# Patient Record
Sex: FEMALE | Race: BLACK OR AFRICAN AMERICAN | NOT HISPANIC OR LATINO
[De-identification: names, ages, dates, MRNs, and addresses within clinical notes are randomized per-mention and may not be internally consistent; named-entity substitution may affect disease eponyms.]

---

## 2019-07-09 ENCOUNTER — APPOINTMENT (OUTPATIENT)
Dept: VASCULAR SURGERY | Facility: CLINIC | Age: 54
End: 2019-07-09
Payer: OTHER MISCELLANEOUS

## 2019-07-09 VITALS — DIASTOLIC BLOOD PRESSURE: 117 MMHG | SYSTOLIC BLOOD PRESSURE: 172 MMHG | HEART RATE: 92 BPM

## 2019-07-09 VITALS
TEMPERATURE: 98.3 F | DIASTOLIC BLOOD PRESSURE: 106 MMHG | BODY MASS INDEX: 53.92 KG/M2 | WEIGHT: 293 LBS | HEIGHT: 62 IN | HEART RATE: 93 BPM | SYSTOLIC BLOOD PRESSURE: 160 MMHG

## 2019-07-09 PROCEDURE — 93970 EXTREMITY STUDY: CPT

## 2019-07-09 PROCEDURE — 99202 OFFICE O/P NEW SF 15 MIN: CPT

## 2019-07-09 NOTE — HISTORY OF PRESENT ILLNESS
[FreeTextEntry1] : 54F on coumadin for history of multiple unprovoked DVT and PE, presents with LLE swelling. She first noticed the swelling 2 months ago when she got hit in the shin. She is able to ambulate without difficulty but feel her legs getting tired throughout the day. She has been therapeutic on her coumadin. She wears her compression stockings intermittently.

## 2019-07-09 NOTE — ASSESSMENT
[Arterial/Venous Disease] : arterial/venous disease [FreeTextEntry1] : 54F with LLE swelling, duplex demonstrated no DVT or reflux  No evidence  of  Venous  Insufficiency. Lymphedema  is  possible  \par - Encouraged weight loss \par - Continue compression stocking

## 2019-07-09 NOTE — PHYSICAL EXAM
[Normal Breath Sounds] : Normal breath sounds [Normal Heart Sounds] : normal heart sounds [Ankle Swelling (On Exam)] : present [2+] : left 2+ [Ankle Swelling Bilaterally] : severe [de-identified] : well appearing

## 2021-07-12 ENCOUNTER — INPATIENT (INPATIENT)
Facility: HOSPITAL | Age: 56
LOS: 8 days | Discharge: HOME HEALTH | End: 2021-07-21
Attending: HOSPITALIST | Admitting: HOSPITALIST
Payer: COMMERCIAL

## 2021-07-12 VITALS
DIASTOLIC BLOOD PRESSURE: 96 MMHG | OXYGEN SATURATION: 100 % | SYSTOLIC BLOOD PRESSURE: 176 MMHG | HEART RATE: 97 BPM | RESPIRATION RATE: 20 BRPM | TEMPERATURE: 98 F

## 2021-07-12 LAB
ALBUMIN SERPL ELPH-MCNC: 4.5 G/DL — SIGNIFICANT CHANGE UP (ref 3.3–5)
ALP SERPL-CCNC: 137 U/L — HIGH (ref 40–120)
ALT FLD-CCNC: 32 U/L — SIGNIFICANT CHANGE UP (ref 4–33)
ANION GAP SERPL CALC-SCNC: 12 MMOL/L — SIGNIFICANT CHANGE UP (ref 7–14)
APTT BLD: 40.6 SEC — HIGH (ref 27–36.3)
AST SERPL-CCNC: 21 U/L — SIGNIFICANT CHANGE UP (ref 4–32)
BASOPHILS # BLD AUTO: 0.04 K/UL — SIGNIFICANT CHANGE UP (ref 0–0.2)
BASOPHILS NFR BLD AUTO: 0.5 % — SIGNIFICANT CHANGE UP (ref 0–2)
BILIRUB SERPL-MCNC: 0.5 MG/DL — SIGNIFICANT CHANGE UP (ref 0.2–1.2)
BUN SERPL-MCNC: 8 MG/DL — SIGNIFICANT CHANGE UP (ref 7–23)
CALCIUM SERPL-MCNC: 9.8 MG/DL — SIGNIFICANT CHANGE UP (ref 8.4–10.5)
CHLORIDE SERPL-SCNC: 104 MMOL/L — SIGNIFICANT CHANGE UP (ref 98–107)
CO2 SERPL-SCNC: 29 MMOL/L — SIGNIFICANT CHANGE UP (ref 22–31)
CREAT SERPL-MCNC: 0.84 MG/DL — SIGNIFICANT CHANGE UP (ref 0.5–1.3)
EOSINOPHIL # BLD AUTO: 0.12 K/UL — SIGNIFICANT CHANGE UP (ref 0–0.5)
EOSINOPHIL NFR BLD AUTO: 1.5 % — SIGNIFICANT CHANGE UP (ref 0–6)
GLUCOSE SERPL-MCNC: 98 MG/DL — SIGNIFICANT CHANGE UP (ref 70–99)
HCT VFR BLD CALC: 49.1 % — HIGH (ref 34.5–45)
HGB BLD-MCNC: 14.4 G/DL — SIGNIFICANT CHANGE UP (ref 11.5–15.5)
IANC: 5.4 K/UL — SIGNIFICANT CHANGE UP (ref 1.5–8.5)
IMM GRANULOCYTES NFR BLD AUTO: 0.4 % — SIGNIFICANT CHANGE UP (ref 0–1.5)
INR BLD: 2.18 RATIO — HIGH (ref 0.88–1.16)
LYMPHOCYTES # BLD AUTO: 2.03 K/UL — SIGNIFICANT CHANGE UP (ref 1–3.3)
LYMPHOCYTES # BLD AUTO: 24.7 % — SIGNIFICANT CHANGE UP (ref 13–44)
MCHC RBC-ENTMCNC: 27.6 PG — SIGNIFICANT CHANGE UP (ref 27–34)
MCHC RBC-ENTMCNC: 29.3 GM/DL — LOW (ref 32–36)
MCV RBC AUTO: 94.1 FL — SIGNIFICANT CHANGE UP (ref 80–100)
MONOCYTES # BLD AUTO: 0.61 K/UL — SIGNIFICANT CHANGE UP (ref 0–0.9)
MONOCYTES NFR BLD AUTO: 7.4 % — SIGNIFICANT CHANGE UP (ref 2–14)
NEUTROPHILS # BLD AUTO: 5.4 K/UL — SIGNIFICANT CHANGE UP (ref 1.8–7.4)
NEUTROPHILS NFR BLD AUTO: 65.5 % — SIGNIFICANT CHANGE UP (ref 43–77)
NRBC # BLD: 0 /100 WBCS — SIGNIFICANT CHANGE UP
NRBC # FLD: 0 K/UL — SIGNIFICANT CHANGE UP
NT-PROBNP SERPL-SCNC: 189 PG/ML — SIGNIFICANT CHANGE UP
PLATELET # BLD AUTO: 260 K/UL — SIGNIFICANT CHANGE UP (ref 150–400)
POTASSIUM SERPL-MCNC: 3.7 MMOL/L — SIGNIFICANT CHANGE UP (ref 3.5–5.3)
POTASSIUM SERPL-SCNC: 3.7 MMOL/L — SIGNIFICANT CHANGE UP (ref 3.5–5.3)
PROT SERPL-MCNC: 7.5 G/DL — SIGNIFICANT CHANGE UP (ref 6–8.3)
PROTHROM AB SERPL-ACNC: 24.2 SEC — HIGH (ref 10.6–13.6)
RBC # BLD: 5.22 M/UL — HIGH (ref 3.8–5.2)
RBC # FLD: 15.1 % — HIGH (ref 10.3–14.5)
SODIUM SERPL-SCNC: 145 MMOL/L — SIGNIFICANT CHANGE UP (ref 135–145)
TROPONIN T, HIGH SENSITIVITY RESULT: 8 NG/L — SIGNIFICANT CHANGE UP
WBC # BLD: 8.23 K/UL — SIGNIFICANT CHANGE UP (ref 3.8–10.5)
WBC # FLD AUTO: 8.23 K/UL — SIGNIFICANT CHANGE UP (ref 3.8–10.5)

## 2021-07-12 PROCEDURE — 99285 EMERGENCY DEPT VISIT HI MDM: CPT

## 2021-07-12 PROCEDURE — 71045 X-RAY EXAM CHEST 1 VIEW: CPT | Mod: 26

## 2021-07-12 NOTE — ED ADULT TRIAGE NOTE - CHIEF COMPLAINT QUOTE
Pt with PMH of DVT and PE on Warfarin, sent by PMD for eval of SOB and VIERA x 2 weeks. Denies recent travel or covid history, fully vaccinated. Denies chest pain.

## 2021-07-12 NOTE — ED PROVIDER NOTE - OBJECTIVE STATEMENT
55 yo f pmh htn, PE, LLE DVT, on coumadin, p/w VIERA x 2 weeks. sxs worsening with walking, today pt felt SOB while driving and called PMD who told pt to come to ED. has a hx of sob and water retention, currently not on any diuretics.   pt denies any fever, chills, dizziness, cp, palpitations, wheezing, abdominal pain, n/v/d, dysuria, hematuria, or any weight loss.    has been complaint with her medication.

## 2021-07-12 NOTE — ED PROVIDER NOTE - PROGRESS NOTE DETAILS
Jigar Roberson DO: pt w/ o2 requirement, on nc w/ improvement. Joel Barth PGY-2 patient desat to 85% on ambulation, became tachypneic. will admit for further monitoring. patient states she feels too sob to go home.

## 2021-07-12 NOTE — ED PROVIDER NOTE - PHYSICAL EXAMINATION
Vital signs reviewed  GENERAL: Patient nontoxic appearing, NAD. obese female.   HEAD: NCAT  EYES: Anicteric  ENT: MMM  NECK: Supple, non tender  RESPIRATORY: Normal respiratory effort. CTA B/L. No wheezing, rales, rhonchi  CARDIOVASCULAR: Regular rate and rhythm  ABDOMEN: Soft. Nondistended. Nontender. No guarding or rebound. No CVA tenderness.  MUSCULOSKELETAL/EXTREMITIES: Brisk cap refill. 2+ radial pulses. b/l 2+ pitting edema.   SKIN:  Warm and dry  NEURO: AAOx3. No gross FND.  PSYCHIATRIC: Cooperative. Affect appropriate.

## 2021-07-12 NOTE — ED PROCEDURE NOTE - ATTENDING CONTRIBUTION TO CARE
Jigar Roberson DO:  patient seen and evaluated with the resident.  I was present for key portions of the History & Physical, and I agree with the Impression & Plan.

## 2021-07-12 NOTE — ED PROVIDER NOTE - CLINICAL SUMMARY MEDICAL DECISION MAKING FREE TEXT BOX
55 yo f pmh htn, PE, LLE DVT, on coumadin, p/w VIERA x 2 weeks. sxs worsening with walking, today pt felt SOB while driving and called PMD who told pt to come to ED. has a hx of sob and water retention, currently not on any diuretics.  vtials wnl, on exam patient has 2+ pitting edema, lungs clear, will eval for pneumonia, PE, covid, chf with labs and imaging.

## 2021-07-12 NOTE — ED PROVIDER NOTE - NS ED ROS FT
Constitutional: No fever, chills.  Eyes:  No visual changes  ENMT:  No neck pain  Cardiac:  No chest pain  Respiratory:  +cough, +SOB  GI:  No nausea, vomiting, diarrhea, abdominal pain.  :  No dysuria, hematuria  MS:  No back pain.  Neuro:  No headache or lightheadedness  Skin:  No skin rash  Endocrine: No history of thyroid disease or diabetes.  Except as documented in the HPI,  all other systems are negative.

## 2021-07-12 NOTE — ED ADULT NURSE REASSESSMENT NOTE - NS ED NURSE REASSESS COMMENT FT1
Pt is AOX4, Breathing non-labored but complain of SOB at this time. Pt placed on cardiac monitor, NSR noted, SpO2 86% on room air, MD Roberson aware. Pt placed on 3L of O2 NC, SpO2 improved to 100%. Awaiting CT

## 2021-07-12 NOTE — ED PROVIDER NOTE - ATTENDING CONTRIBUTION TO CARE
Jigar Roberson, DO:  patient seen and evaluated with the resident.  I was present for key portions of the History & Physical, and I agree with the Impression & Plan. 57 yo f pmh htn, PE, LLE DVT, on coumadin, pw lewis. reports two weeks of sx. worst today prompting eval. denies cp. denies n/v, ha, f/c, cough, s/p covid vax. ekg nsr. arrives hds, no hypoxia upon initial eval. appears mildly dyspneic, lungs cta, rrr. will eval for acs, pe, if w/u non actionable likely admit for tte.

## 2021-07-12 NOTE — ED ADULT NURSE NOTE - OBJECTIVE STATEMENT
FLOAT: Received patient to 20 for SOB. A&o4, ambulatory, hx of DVT and PE on Warfarin, sent by PCP to r/o PE, pt c/o dyspnea on exertion, rr even and unlabored, 100% on room air at this time. Denies chest pain, n/v. hx of HTN. RN at bedside for IV insertion.

## 2021-07-13 DIAGNOSIS — I27.82 CHRONIC PULMONARY EMBOLISM: ICD-10-CM

## 2021-07-13 DIAGNOSIS — R06.00 DYSPNEA, UNSPECIFIED: ICD-10-CM

## 2021-07-13 DIAGNOSIS — R07.89 OTHER CHEST PAIN: ICD-10-CM

## 2021-07-13 DIAGNOSIS — E66.01 MORBID (SEVERE) OBESITY DUE TO EXCESS CALORIES: ICD-10-CM

## 2021-07-13 DIAGNOSIS — Z86.39 PERSONAL HISTORY OF OTHER ENDOCRINE, NUTRITIONAL AND METABOLIC DISEASE: Chronic | ICD-10-CM

## 2021-07-13 DIAGNOSIS — Z29.9 ENCOUNTER FOR PROPHYLACTIC MEASURES, UNSPECIFIED: ICD-10-CM

## 2021-07-13 DIAGNOSIS — I10 ESSENTIAL (PRIMARY) HYPERTENSION: ICD-10-CM

## 2021-07-13 DIAGNOSIS — Z98.891 HISTORY OF UTERINE SCAR FROM PREVIOUS SURGERY: Chronic | ICD-10-CM

## 2021-07-13 LAB
ANION GAP SERPL CALC-SCNC: 13 MMOL/L — SIGNIFICANT CHANGE UP (ref 7–14)
APTT BLD: 38.4 SEC — HIGH (ref 27–36.3)
BASOPHILS # BLD AUTO: 0.03 K/UL — SIGNIFICANT CHANGE UP (ref 0–0.2)
BASOPHILS NFR BLD AUTO: 0.4 % — SIGNIFICANT CHANGE UP (ref 0–2)
BUN SERPL-MCNC: 7 MG/DL — SIGNIFICANT CHANGE UP (ref 7–23)
CALCIUM SERPL-MCNC: 9.2 MG/DL — SIGNIFICANT CHANGE UP (ref 8.4–10.5)
CHLORIDE SERPL-SCNC: 103 MMOL/L — SIGNIFICANT CHANGE UP (ref 98–107)
CO2 SERPL-SCNC: 28 MMOL/L — SIGNIFICANT CHANGE UP (ref 22–31)
CREAT SERPL-MCNC: 0.77 MG/DL — SIGNIFICANT CHANGE UP (ref 0.5–1.3)
EOSINOPHIL # BLD AUTO: 0.16 K/UL — SIGNIFICANT CHANGE UP (ref 0–0.5)
EOSINOPHIL NFR BLD AUTO: 2.4 % — SIGNIFICANT CHANGE UP (ref 0–6)
GLUCOSE SERPL-MCNC: 102 MG/DL — HIGH (ref 70–99)
HCT VFR BLD CALC: 45.6 % — HIGH (ref 34.5–45)
HGB BLD-MCNC: 13.5 G/DL — SIGNIFICANT CHANGE UP (ref 11.5–15.5)
IANC: 4.57 K/UL — SIGNIFICANT CHANGE UP (ref 1.5–8.5)
IMM GRANULOCYTES NFR BLD AUTO: 0.3 % — SIGNIFICANT CHANGE UP (ref 0–1.5)
INR BLD: 2.14 RATIO — HIGH (ref 0.88–1.16)
LYMPHOCYTES # BLD AUTO: 1.4 K/UL — SIGNIFICANT CHANGE UP (ref 1–3.3)
LYMPHOCYTES # BLD AUTO: 20.6 % — SIGNIFICANT CHANGE UP (ref 13–44)
MAGNESIUM SERPL-MCNC: 2.3 MG/DL — SIGNIFICANT CHANGE UP (ref 1.6–2.6)
MCHC RBC-ENTMCNC: 27.6 PG — SIGNIFICANT CHANGE UP (ref 27–34)
MCHC RBC-ENTMCNC: 29.6 GM/DL — LOW (ref 32–36)
MCV RBC AUTO: 93.1 FL — SIGNIFICANT CHANGE UP (ref 80–100)
MONOCYTES # BLD AUTO: 0.62 K/UL — SIGNIFICANT CHANGE UP (ref 0–0.9)
MONOCYTES NFR BLD AUTO: 9.1 % — SIGNIFICANT CHANGE UP (ref 2–14)
NEUTROPHILS # BLD AUTO: 4.57 K/UL — SIGNIFICANT CHANGE UP (ref 1.8–7.4)
NEUTROPHILS NFR BLD AUTO: 67.2 % — SIGNIFICANT CHANGE UP (ref 43–77)
NRBC # BLD: 0 /100 WBCS — SIGNIFICANT CHANGE UP
NRBC # FLD: 0 K/UL — SIGNIFICANT CHANGE UP
PLATELET # BLD AUTO: 233 K/UL — SIGNIFICANT CHANGE UP (ref 150–400)
POTASSIUM SERPL-MCNC: 3.8 MMOL/L — SIGNIFICANT CHANGE UP (ref 3.5–5.3)
POTASSIUM SERPL-SCNC: 3.8 MMOL/L — SIGNIFICANT CHANGE UP (ref 3.5–5.3)
PROTHROM AB SERPL-ACNC: 23.5 SEC — HIGH (ref 10.6–13.6)
RBC # BLD: 4.9 M/UL — SIGNIFICANT CHANGE UP (ref 3.8–5.2)
RBC # FLD: 15 % — HIGH (ref 10.3–14.5)
SARS-COV-2 RNA SPEC QL NAA+PROBE: SIGNIFICANT CHANGE UP
SODIUM SERPL-SCNC: 144 MMOL/L — SIGNIFICANT CHANGE UP (ref 135–145)
TROPONIN T, HIGH SENSITIVITY RESULT: 7 NG/L — SIGNIFICANT CHANGE UP
WBC # BLD: 6.8 K/UL — SIGNIFICANT CHANGE UP (ref 3.8–10.5)
WBC # FLD AUTO: 6.8 K/UL — SIGNIFICANT CHANGE UP (ref 3.8–10.5)

## 2021-07-13 PROCEDURE — 99223 1ST HOSP IP/OBS HIGH 75: CPT

## 2021-07-13 PROCEDURE — 71275 CT ANGIOGRAPHY CHEST: CPT | Mod: 26

## 2021-07-13 PROCEDURE — 12345: CPT | Mod: NC

## 2021-07-13 RX ORDER — LOSARTAN POTASSIUM 100 MG/1
100 TABLET, FILM COATED ORAL DAILY
Refills: 0 | Status: DISCONTINUED | OUTPATIENT
Start: 2021-07-13 | End: 2021-07-21

## 2021-07-13 RX ORDER — MONTELUKAST 4 MG/1
10 TABLET, CHEWABLE ORAL DAILY
Refills: 0 | Status: DISCONTINUED | OUTPATIENT
Start: 2021-07-13 | End: 2021-07-21

## 2021-07-13 RX ORDER — ALBUTEROL 90 UG/1
2 AEROSOL, METERED ORAL EVERY 6 HOURS
Refills: 0 | Status: DISCONTINUED | OUTPATIENT
Start: 2021-07-13 | End: 2021-07-21

## 2021-07-13 RX ORDER — WARFARIN SODIUM 2.5 MG/1
10 TABLET ORAL ONCE
Refills: 0 | Status: COMPLETED | OUTPATIENT
Start: 2021-07-13 | End: 2021-07-13

## 2021-07-13 RX ORDER — AMLODIPINE BESYLATE 2.5 MG/1
5 TABLET ORAL DAILY
Refills: 0 | Status: DISCONTINUED | OUTPATIENT
Start: 2021-07-13 | End: 2021-07-21

## 2021-07-13 RX ORDER — ALBUTEROL 90 UG/1
1 AEROSOL, METERED ORAL ONCE
Refills: 0 | Status: COMPLETED | OUTPATIENT
Start: 2021-07-13 | End: 2021-07-13

## 2021-07-13 RX ADMIN — MONTELUKAST 10 MILLIGRAM(S): 4 TABLET, CHEWABLE ORAL at 11:15

## 2021-07-13 RX ADMIN — ALBUTEROL 1 PUFF(S): 90 AEROSOL, METERED ORAL at 05:22

## 2021-07-13 RX ADMIN — LOSARTAN POTASSIUM 100 MILLIGRAM(S): 100 TABLET, FILM COATED ORAL at 05:21

## 2021-07-13 RX ADMIN — WARFARIN SODIUM 10 MILLIGRAM(S): 2.5 TABLET ORAL at 18:19

## 2021-07-13 RX ADMIN — AMLODIPINE BESYLATE 5 MILLIGRAM(S): 2.5 TABLET ORAL at 05:22

## 2021-07-13 NOTE — H&P ADULT - HISTORY OF PRESENT ILLNESS
55 yo f pmh htn, PE, LLE DVT, on coumadin, p/w VIERA x 2 weeks. sxs worsening with walking, today pt felt SOB while driving and called PMD who told pt to come to ED. has a hx of sob and water retention, currently not on any diuretics.   	pt denies any fever, chills, dizziness, cp, palpitations, wheezing, abdominal pain, n/v/d, dysuria, hematuria, or any weight loss.    has been complaint with her medication. 57 yo f pmh HTN, PEx2 and LLE DVT on coumadin, p/w VIERA x 2 weeks and now having worsening chest tightness and dyspnea. She started having mild dyspnea on exertion 2 weeks ago and getting progressively worse.. 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 She could barely walk a few feet before having to take a breath. She called PMD who told pt to come to ED. She denies any fever, chills, dizziness, cp, palpitations, wheezing, abdominal pain, n/v/d, dysuria, hematuria, orthopnea or leg edema. She has been complaint with her medication. In the past, she had an episode of chest tightness associated with wheezing and was prescribed Montelukast by her PCP. Did not try albuterol.    In the ED, /87 HR 93 and on some NC saturating 98%. At bedside, patient was saturating 99% on RA. Labs with troponin 8->7. EKG without ischemic changes. CTA chest without main or R/L PE. CXR  55 yo f pmh HTN, PEx2 and LLE DVT on coumadin, p/w VIERA x 2 weeks and now having worsening chest tightness and dyspnea. She started having mild dyspnea on exertion 2 weeks ago and getting progressively worse.. 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 She could barely walk a few feet before having to take a breath. She called PMD who told pt to come to ED. She denies any fever, chills, dizziness, cp, palpitations, wheezing, abdominal pain, n/v/d, dysuria, hematuria, orthopnea or leg edema. She has been complaint with her medication. In the past, she had an episode of chest tightness associated with wheezing and was prescribed Montelukast by her PCP. Did not try albuterol.    In the ED, /87 HR 93 and on some NC saturating 98%. At bedside, patient was saturating 99% on RA. Labs with troponin 8->7. EKG without ischemic changes. CTA chest without main or R/L PE. Currently still having mild chest pressure but states that it feels like indigestion. Overall, improved with just O2.

## 2021-07-13 NOTE — H&P ADULT - NSHPPHYSICALEXAM_GEN_ALL_CORE
PHYSICAL EXAM:  GENERAL: NAD, well-developed, well-nourished. Morbidly obese  HEAD:  Atraumatic, Normocephalic  EYES: EOMI, PERRL, conjunctiva and sclera clear  NECK: Supple, No JVD  CHEST/LUNG: Clear to auscultation bilaterally; No wheezes, rales or rhonchi  HEART: Regular rate and rhythm; No murmurs, rubs, or gallops, (+)S1, S2  ABDOMEN: Soft, Nontender, Nondistended; Normal Bowel sounds   EXTREMITIES:  2+ Peripheral Pulses, No clubbing, cyanosis, or edema  PSYCH: normal mood and affect  NEUROLOGY: AAOx3, non-focal  SKIN: No rashes or lesions

## 2021-07-13 NOTE — H&P ADULT - NSHPLABSRESULTS_GEN_ALL_CORE
07-12    145  |  104  |  8   ----------------------------<  98  3.7   |  29  |  0.84    Ca    9.8      12 Jul 2021 22:26    TPro  7.5  /  Alb  4.5  /  TBili  0.5  /  DBili  x   /  AST  21  /  ALT  32  /  AlkPhos  137<H>  07-12                            14.4   8.23  )-----------( 260      ( 12 Jul 2021 22:26 )             49.1             LIVER FUNCTIONS - ( 12 Jul 2021 22:26 )  Alb: 4.5 g/dL / Pro: 7.5 g/dL / ALK PHOS: 137 U/L / ALT: 32 U/L / AST: 21 U/L / GGT: x             PT/INR - ( 12 Jul 2021 22:26 )   PT: 24.2 sec;   INR: 2.18 ratio         PTT - ( 12 Jul 2021 22:26 )  PTT:40.6 sec

## 2021-07-13 NOTE — PROGRESS NOTE ADULT - PROBLEM SELECTOR PLAN 1
Ddx include new CHF (including cor pulmonale) vs CTEPH vs reactive airway disease  - Patient reports significant 2nd hand smoking exposure from ex   - COVID negative  - Echo pending to eval for CHF  - Check O2 sat on RA at rest and ambulation  - Albuterol PRN  - Recommended outpatient PFTs and sleep study  -May consider V/Q scan for CTEPH (higher sensitivity) after Echo evaluation Ddx include new CHF (including cor pulmonale) vs CTEPH vs reactive airway disease  - Patient reports significant 2nd hand smoking exposure from ex   - COVID negative  - Echo pending to eval for CHF  - CTPA: No main, right, left, or lobar PE. Limited evaluation of segmental and subsegmental pulmonary arteries  - Check O2 sat on RA at rest and ambulation  - Albuterol PRN  - Recommended outpatient PFTs and sleep study  -May consider V/Q scan for CTEPH (higher sensitivity) after Echo evaluation

## 2021-07-13 NOTE — H&P ADULT - NSHPREVIEWOFSYSTEMS_GEN_ALL_CORE
REVIEW OF SYSTEMS:    CONSTITUTIONAL: No weakness, fevers or chills  EYES/ENT: No visual changes;  No dysphagia  NECK: No pain or stiffness  RESPIRATORY: No cough, wheezing, hemoptysis;  CARDIOVASCULAR: No chest palpitations; No lower extremity edema  GASTROINTESTINAL: No abdominal or epigastric pain. No nausea, vomiting, or hematemesis; No diarrhea or constipation. No melena or hematochezia.  GENITOURINARY: No dysuria, frequency or hematuria  NEUROLOGICAL: No numbness or weakness  SKIN: No itching, burning, rashes, or lesions   PSYCH: No auditory or visual hallucinations  All other review of systems is negative unless indicated above.

## 2021-07-13 NOTE — PATIENT PROFILE ADULT - FALL HARM RISK
weakness, +VIERA, on oxygen therapy/coagulation(Bleeding disorder R/T clinical cond/anti-coags)/other

## 2021-07-13 NOTE — PROGRESS NOTE ADULT - ASSESSMENT
56 yr old woman PMH HTN, PEx2 and LLE DVT on coumadin, p/w progressively worsening VIERA x 2 weeks with associated chest tightness. R/O HF vs reactive airway disease

## 2021-07-13 NOTE — H&P ADULT - ASSESSMENT
57 yo f pmh HTN, PEx2 and LLE DVT on coumadin, p/w VIERA x 2 weeks and now having worsening chest tightness and dyspnea admitted for acute hypoxia/dyspnea for exertion and further work up. Currently on RA with some mild chest discomfort.

## 2021-07-13 NOTE — H&P ADULT - NSICDXPASTMEDICALHX_GEN_ALL_CORE_FT
PAST MEDICAL HISTORY:  Acute DVT (deep venous thrombosis)     HTN (hypertension)     Morbid obesity     Pulmonary embolism

## 2021-07-13 NOTE — PROGRESS NOTE ADULT - PROBLEM SELECTOR PLAN 6
FENP: No IVF, lytes PRN, low fat diet, warfarin (therapeutic INR)  Dispo: pending TTE, ambulatory O2 sat

## 2021-07-13 NOTE — H&P ADULT - PROBLEM SELECTOR PLAN 1
Ddx include new CHF (including cor pulmonale) vs CTEPH vs other organic pulmonary process  -Echo pending to eval for CHF  -No direct evidence of systolic CHF but could be diatolic CHF. May benefit from diuretics but wait for pending echo  -May consider V/Q scan for CTEPH (higher sensitivity) after Echo evaluation  -1 dose albuterol trial

## 2021-07-13 NOTE — H&P ADULT - NSICDXFAMILYHX_GEN_ALL_CORE_FT
FAMILY HISTORY:  Father  Still living? Unknown  FH: type 2 diabetes, Age at diagnosis: Age Unknown    Mother  Still living? Unknown  FH: HTN (hypertension), Age at diagnosis: Age Unknown    Sibling  Still living? Unknown  Family history of DVT, Age at diagnosis: Age Unknown

## 2021-07-13 NOTE — H&P ADULT - PROBLEM SELECTOR PLAN 2
Could be related to dyspnea  -Trend one more troponin and EKG. Less likely ischemic heart disease.  -HEART 3: Low score

## 2021-07-13 NOTE — PROGRESS NOTE ADULT - SUBJECTIVE AND OBJECTIVE BOX
[facility] Division of [facility] Medicine  Suzanna Man DO  Pager (YARONF, 8A-5P): [phone]      Patient is a 56y old  Female who presents with a chief complaint of Dyspnea and chest pressure (13 Jul 2021 04:03)      SUBJECTIVE / OVERNIGHT EVENTS: Feels better, less chest tightness and no SOB at rest. Reports having snoring at night, never had sleep study. Has intermittent dry cough.    MEDICATIONS  (STANDING):  amLODIPine   Tablet 5 milliGRAM(s) Oral daily  losartan 100 milliGRAM(s) Oral daily  montelukast 10 milliGRAM(s) Oral daily  warfarin 10 milliGRAM(s) Oral once    MEDICATIONS  (PRN):  ALBUTerol    90 MICROgram(s) HFA Inhaler 2 Puff(s) Inhalation every 6 hours PRN Shortness of Breath and/or Wheezing      CAPILLARY BLOOD GLUCOSE        I&O's Summary      PHYSICAL EXAM:  Vital Signs Last 24 Hrs  T(C): 36.7 (13 Jul 2021 12:04), Max: 37.1 (13 Jul 2021 01:40)  T(F): 98 (13 Jul 2021 12:04), Max: 98.7 (13 Jul 2021 01:40)  HR: 90 (13 Jul 2021 12:04) (76 - 98)  BP: 131/92 (13 Jul 2021 12:04) (127/83 - 176/96)  BP(mean): 107 (13 Jul 2021 08:36) (107 - 107)  RR: 20 (13 Jul 2021 12:04) (19 - 24)  SpO2: 72% (13 Jul 2021 12:41) (72% - 100%)    CONSTITUTIONAL: NAD, on NC, obese  HEENT: sclera clear, MMM, eyes tracking  RESPIRATORY: Normal respiratory effort; lungs are clear to auscultation bilaterally  CARDIOVASCULAR: S1/S2, RRR, no murmurs appreciated; + 2 pitting edema b/l LE  ABDOMEN: soft, Nontender, nondistended, normoactive bowel sounds, no rebound/guarding  PSYCH: calm, cooperative  NEUROLOGY: awake, alert, no gross deficits, moving all extremities  SKIN: hyperpigmentation of LLE>RLE    LABS:                        13.5   6.80  )-----------( 233      ( 13 Jul 2021 07:03 )             45.6     07-13    144  |  103  |  7   ----------------------------<  102<H>  3.8   |  28  |  0.77    Ca    9.2      13 Jul 2021 07:03  Mg     2.30     07-13    TPro  7.5  /  Alb  4.5  /  TBili  0.5  /  DBili  x   /  AST  21  /  ALT  32  /  AlkPhos  137<H>  07-12    PT/INR - ( 13 Jul 2021 07:03 )   PT: 23.5 sec;   INR: 2.14 ratio         PTT - ( 13 Jul 2021 07:03 )  PTT:38.4 sec            RADIOLOGY & ADDITIONAL TESTS:  Results Reviewed:   Imaging Personally Reviewed:  Electrocardiogram Personally Reviewed:    COORDINATION OF CARE:  Care Discussed with Consultants/Other Providers [Y/N]:  Prior or Outpatient Records Reviewed [Y/N]:

## 2021-07-14 DIAGNOSIS — J96.01 ACUTE RESPIRATORY FAILURE WITH HYPOXIA: ICD-10-CM

## 2021-07-14 LAB
ANION GAP SERPL CALC-SCNC: 11 MMOL/L — SIGNIFICANT CHANGE UP (ref 7–14)
APTT BLD: 38.5 SEC — HIGH (ref 27–36.3)
BASE EXCESS BLDA CALC-SCNC: 10 MMOL/L — HIGH (ref -2–2)
BUN SERPL-MCNC: 11 MG/DL — SIGNIFICANT CHANGE UP (ref 7–23)
CALCIUM SERPL-MCNC: 9.1 MG/DL — SIGNIFICANT CHANGE UP (ref 8.4–10.5)
CHLORIDE SERPL-SCNC: 104 MMOL/L — SIGNIFICANT CHANGE UP (ref 98–107)
CO2 SERPL-SCNC: 32 MMOL/L — HIGH (ref 22–31)
COVID-19 SPIKE DOMAIN AB INTERP: POSITIVE
COVID-19 SPIKE DOMAIN ANTIBODY RESULT: >250 U/ML — HIGH
CREAT SERPL-MCNC: 0.81 MG/DL — SIGNIFICANT CHANGE UP (ref 0.5–1.3)
GLUCOSE SERPL-MCNC: 103 MG/DL — HIGH (ref 70–99)
HCO3 BLDA-SCNC: 32 MMOL/L — HIGH (ref 22–26)
HCT VFR BLD CALC: 46.8 % — HIGH (ref 34.5–45)
HGB BLD-MCNC: 13.4 G/DL — SIGNIFICANT CHANGE UP (ref 11.5–15.5)
INR BLD: 1.86 RATIO — HIGH (ref 0.88–1.16)
MAGNESIUM SERPL-MCNC: 2.3 MG/DL — SIGNIFICANT CHANGE UP (ref 1.6–2.6)
MCHC RBC-ENTMCNC: 27.6 PG — SIGNIFICANT CHANGE UP (ref 27–34)
MCHC RBC-ENTMCNC: 28.6 GM/DL — LOW (ref 32–36)
MCV RBC AUTO: 96.3 FL — SIGNIFICANT CHANGE UP (ref 80–100)
NRBC # BLD: 0 /100 WBCS — SIGNIFICANT CHANGE UP
NRBC # FLD: 0 K/UL — SIGNIFICANT CHANGE UP
PCO2 BLDA: 68 MMHG — HIGH (ref 32–48)
PH BLDA: 7.34 — LOW (ref 7.35–7.45)
PHOSPHATE SERPL-MCNC: 3.2 MG/DL — SIGNIFICANT CHANGE UP (ref 2.5–4.5)
PLATELET # BLD AUTO: 211 K/UL — SIGNIFICANT CHANGE UP (ref 150–400)
PO2 BLDA: 66 MMHG — LOW (ref 83–108)
POTASSIUM SERPL-MCNC: 4.1 MMOL/L — SIGNIFICANT CHANGE UP (ref 3.5–5.3)
POTASSIUM SERPL-SCNC: 4.1 MMOL/L — SIGNIFICANT CHANGE UP (ref 3.5–5.3)
PROTHROM AB SERPL-ACNC: 20.8 SEC — HIGH (ref 10.6–13.6)
RBC # BLD: 4.86 M/UL — SIGNIFICANT CHANGE UP (ref 3.8–5.2)
RBC # FLD: 15.1 % — HIGH (ref 10.3–14.5)
SAO2 % BLDA: 93.6 % — LOW (ref 95–99)
SARS-COV-2 IGG+IGM SERPL QL IA: >250 U/ML — HIGH
SARS-COV-2 IGG+IGM SERPL QL IA: POSITIVE
SODIUM SERPL-SCNC: 147 MMOL/L — HIGH (ref 135–145)
WBC # BLD: 6.15 K/UL — SIGNIFICANT CHANGE UP (ref 3.8–10.5)
WBC # FLD AUTO: 6.15 K/UL — SIGNIFICANT CHANGE UP (ref 3.8–10.5)

## 2021-07-14 PROCEDURE — 99222 1ST HOSP IP/OBS MODERATE 55: CPT

## 2021-07-14 PROCEDURE — 99233 SBSQ HOSP IP/OBS HIGH 50: CPT | Mod: GC

## 2021-07-14 PROCEDURE — 93306 TTE W/DOPPLER COMPLETE: CPT | Mod: 26

## 2021-07-14 RX ORDER — FUROSEMIDE 40 MG
40 TABLET ORAL ONCE
Refills: 0 | Status: COMPLETED | OUTPATIENT
Start: 2021-07-14 | End: 2021-07-14

## 2021-07-14 RX ORDER — WARFARIN SODIUM 2.5 MG/1
10 TABLET ORAL ONCE
Refills: 0 | Status: COMPLETED | OUTPATIENT
Start: 2021-07-14 | End: 2021-07-14

## 2021-07-14 RX ADMIN — WARFARIN SODIUM 10 MILLIGRAM(S): 2.5 TABLET ORAL at 17:23

## 2021-07-14 RX ADMIN — Medication 40 MILLIGRAM(S): at 11:48

## 2021-07-14 RX ADMIN — AMLODIPINE BESYLATE 5 MILLIGRAM(S): 2.5 TABLET ORAL at 06:03

## 2021-07-14 RX ADMIN — LOSARTAN POTASSIUM 100 MILLIGRAM(S): 100 TABLET, FILM COATED ORAL at 06:03

## 2021-07-14 RX ADMIN — MONTELUKAST 10 MILLIGRAM(S): 4 TABLET, CHEWABLE ORAL at 11:44

## 2021-07-14 NOTE — CONSULT NOTE ADULT - ASSESSMENT
56 year-old morbidly obese F with PMH of PE on Coumadin who presented to the hospital with worsening shortness of breath and desaturation on ambulation. Pulmonary consulted for evaluation of hypoxia in the setting of previous thromboembolic disease.    Dyspnea on Exertion  - Potentially in the setting of pulmonary hypertension, possibly due to OLAMIDE/OHS (nocturnal hypoxia) vs CTEPH given history of thromboembolic disease  - CTA without evidence of acute PE,   - Given inability to ascertain pulmonary pressures on TTE, patient would benefit from a RHC to r/o pulmonary hypertension  - Would hold off on V/Q scan pending evaluation for pHTN  - Patient will need to follow up outpatient with pulmonary office for PFTs and sleep study  - Patient will likely require oxygen on discharge  - Agree with continuing diuresis as patient has significant pedal edema  - Would send autoimmune workup as patient has a family history of SLE  - Weight loss will be important going forward in management of symptoms as well    Robin Damon, PGY-6  Pulmonary and Critical Care Medicine  [phone]

## 2021-07-14 NOTE — CONSULT NOTE ADULT - ATTENDING COMMENTS
56 F htn, PE/DVT on coumadin here with acute hypoxemic respiratory failure and dypsnea of unclear etiology.    Pt had first PE in her 20s while on OCPs, but had two other PEs with no clear inciting cause.  She has been on lifelong a/c since.  Family history of lupus (in her sister, other relatives).  Never worked up for this.  Does snore at night, wakes up with headache, but denies apneas.    Has had LE edema for last few months, but noticed dyspnea on exertion with stairs for the first time last week.    Ct chest w/o PE, ? GGO  Suspect she has multiple reasons for hypoxemia.  Her TTE shows normal LV function, unable to visualize RV function though.    Suspect that she likely has undiagnosed OLAMIDE/OHS, which could cause some hypoxemia.  She also may have CTEPH given her history of PEs, but we do not have overt evidence of pulmonary htn given TTE was inconclusive, other than she has LE edema.  She may have autoimmune component causing her PEs, which may explain some pulmonary hypertension, if she does have it.     #acute hypoxemic respiratory failure   #dyspnea on exertion  #LE edema  - will need more definitive evaluation of whether she has elevated RV pressures, would d/w cardiology re: RHC  - agree with diuresis given LE edema  - would hold off on v/q scan for now given she does not have objective evidence of pulm htn; if she has confirmed pulm htn, then would consider v/q  - would send autoimmune/hypercoag workup, including sekou/dsdna  outpatient sleep study

## 2021-07-14 NOTE — PROGRESS NOTE ADULT - PROBLEM SELECTOR PLAN 1
Ddx include new CHF (including cor pulmonale) vs CTEPH vs reactive airway disease, OHS, OLAMIDE  - Patient reports significant 2nd hand smoking exposure from ex   - COVID negative, TTE with preserved EF: 61% and grossly normal LVSF, unable to accurately assess RVSF  - CTPA: No main, right, left, or lobar PE. Limited evaluation of segmental and subsegmental pulmonary arteries  - Desats to 91 RA at rest and 80 on RA with ambulation. Will need home O2 set up  - Check ABG, AA gradient  - pulmonary eval for ?CTEPH  - trial of Lasix 40 IVP x1 given this morning as proBNP can be falsely low in obese patients  - Albuterol PRN  - Recommended outpatient PFTs and sleep study

## 2021-07-14 NOTE — PROGRESS NOTE ADULT - ASSESSMENT
56 yr old woman PMH HTN, PEx2 and LLE DVT on coumadin, p/w progressively worsening VIERA x 2 weeks with associated chest tightness. Found to have acute hypoxic respiratory failure now on NC, unclear etiology, r/o reactive airway disease, possible OHS, OLAMIDE.

## 2021-07-14 NOTE — PROGRESS NOTE ADULT - PROBLEM SELECTOR PLAN 3
TTE unremarkable, preserved EF  -Considering V/Q scan  -Continue Warfarin 10mg Qd  -daily INR inpatient: goal INR: 2-3, INR subtherapeutic at 1.86 today  Per patient INR on 7/10 was supratherapeutic at 3.4 and was told by LIZETTE Kirkpatrick at PCP office to hold her coumadin that day, she had also held it on 7/5 for similar reason

## 2021-07-14 NOTE — PROGRESS NOTE ADULT - SUBJECTIVE AND OBJECTIVE BOX
[facility] Division of Hospital Medicine  Suzanna Man DO  Pager (YARONF, 8A-5P): [phone]      Patient is a 56y old  Female who presents with a chief complaint of Dyspnea and chest pressure (13 Jul 2021 13:39)      SUBJECTIVE / OVERNIGHT EVENTS: No acute events overnight. Desaturated with exertion/ambulation and felt SOB but feeling better this morning. No chest pain/tightness, N/V/D.    MEDICATIONS  (STANDING):  amLODIPine   Tablet 5 milliGRAM(s) Oral daily  losartan 100 milliGRAM(s) Oral daily  montelukast 10 milliGRAM(s) Oral daily  warfarin 10 milliGRAM(s) Oral once    MEDICATIONS  (PRN):  ALBUTerol    90 MICROgram(s) HFA Inhaler 2 Puff(s) Inhalation every 6 hours PRN Shortness of Breath and/or Wheezing      CAPILLARY BLOOD GLUCOSE        I&O's Summary      PHYSICAL EXAM:  Vital Signs Last 24 Hrs  T(C): 37.3 (14 Jul 2021 11:47), Max: 37.3 (14 Jul 2021 11:47)  T(F): 99.2 (14 Jul 2021 11:47), Max: 99.2 (14 Jul 2021 11:47)  HR: 96 (14 Jul 2021 11:47) (83 - 96)  BP: 129/91 (14 Jul 2021 11:47) (122/83 - 129/91)  BP(mean): 105 (13 Jul 2021 18:11) (105 - 105)  RR: 16 (14 Jul 2021 11:47) (16 - 18)  SpO2: 90% (14 Jul 2021 12:15) (80% - 100%)    CONSTITUTIONAL: NAD, on 3L NC, morbidly obese  HEENT: sclera clear, MMM, eyes tracking  RESPIRATORY: Normal respiratory effort; lungs are clear to auscultation bilaterally  CARDIOVASCULAR: S1/S2, RRR, no murmurs appreciated; + 2 pitting edema b/l LE  ABDOMEN: soft, Nontender, nondistended, normoactive bowel sounds, no rebound/guarding  PSYCH: calm, cooperative  NEUROLOGY: awake, alert, no gross deficits, moving all extremities  SKIN: hyperpigmentation of LLE>RLE      LABS:                        13.4   6.15  )-----------( 211      ( 14 Jul 2021 07:42 )             46.8     07-14    147<H>  |  104  |  11  ----------------------------<  103<H>  4.1   |  32<H>  |  0.81    Ca    9.1      14 Jul 2021 07:42  Phos  3.2     07-14  Mg     2.30     07-14    TPro  7.5  /  Alb  4.5  /  TBili  0.5  /  DBili  x   /  AST  21  /  ALT  32  /  AlkPhos  137<H>  07-12    PT/INR - ( 14 Jul 2021 07:42 )   PT: 20.8 sec;   INR: 1.86 ratio         PTT - ( 14 Jul 2021 07:42 )  PTT:38.5 sec            RADIOLOGY & ADDITIONAL TESTS:  Results Reviewed:   Imaging Personally Reviewed:  Electrocardiogram Personally Reviewed:    COORDINATION OF CARE:  Care Discussed with Consultants/Other Providers [Y/N]:  Prior or Outpatient Records Reviewed [Y/N]:

## 2021-07-14 NOTE — CONSULT NOTE ADULT - SUBJECTIVE AND OBJECTIVE BOX
CC: Dyspnea on exertion    HPI:  57 yo f pmh HTN, PEx2 and LLE DVT on coumadin, p/w VIERA x 2 weeks and now having worsening chest tightness and dyspnea. She started having mild dyspnea on exertion 2 weeks ago and getting progressively worse.. 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 She could barely walk a few feet before having to take a breath. She called PMD who told pt to come to ED. She denies any fever, chills, dizziness, cp, palpitations, wheezing, abdominal pain, n/v/d, dysuria, hematuria, orthopnea or leg edema. She has been complaint with her medication. In the past, she had an episode of chest tightness associated with wheezing and was prescribed Montelukast by her PCP. Did not try albuterol.    In the ED, /87 HR 93 and on some NC saturating 98%. At bedside, patient was saturating 99% on RA. Labs with troponin 8->7. EKG without ischemic changes. CTA chest without main or R/L PE. Currently still having mild chest pressure but states that it feels like indigestion. Overall, improved with just O2. (2021 04:03)    Patient seen and examined at bedside. She reports that for the past 2 weeks she has been having worsening dyspnea on exertion. She initially felt this way climbing up stairs at school. Of note, patient has a known history of thromboembolic disease and has been on Coumadin for many years. She last had a DVT when she was in her 40s. She denies fevers/chills, SOB at rest, and chest pain. She does endorse a cough when talking, but it is not productive. She has never experienced this before. She does have a history of snoring and morning headaches. She denies witnessed apneas.    Of note, patient and family endorse a family history of SLE. She also has a sister who has had PEs and is on Coumadin. She has never had an autoimmune or hypercoagulable workup.    PAST MEDICAL & SURGICAL HISTORY:  HTN (hypertension)    Pulmonary embolism    Acute DVT (deep venous thrombosis)    Morbid obesity    H/O  section        FAMILY HISTORY:  Family history of DVT (Sibling)    FH: HTN (hypertension) (Mother)    FH: type 2 diabetes (Father)        SOCIAL HISTORY:  Smoking: None  EtOH Use: None reported  Occupation: Works at a school  Exposures: None reported  Recent Travel: None reported    Allergies    No Known Allergies    Intolerances        HOME MEDICATIONS:    REVIEW OF SYSTEMS:  Constitutional: No fevers or chills.   Eyes: No itching or discharge from the eyes  ENT: No ear pain. No ear discharge. No nasal congestion.  CV: No chest pain. No palpitations. No lightheadedness or dizziness.   Resp: No dyspnea at rest. +Dyspnea on exertion. No wheezing. +Cough. No stridor. No sputum production. No chest pain with respiration.  GI: No nausea. No vomiting. No diarrhea.  MSK: No joint pain or pain in any extremities  Integumentary: No skin lesions. +Pedal edema.  Neurological: No gross motor weakness. No sensory changes.  [x] All other systems negative  [ ] Unable to assess ROS because ________    OBJECTIVE:  ICU Vital Signs Last 24 Hrs  T(C): 37.3 (2021 11:47), Max: 37.3 (2021 11:47)  T(F): 99.2 (2021 11:47), Max: 99.2 (2021 11:47)  HR: 96 (2021 11:47) (83 - 96)  BP: 129/91 (2021 11:47) (122/83 - 129/91)  BP(mean): 105 (2021 18:11) (105 - 105)  ABP: --  ABP(mean): --  RR: 16 (2021 11:47) (16 - 18)  SpO2: 90% (2021 12:15) (80% - 100%)        CAPILLARY BLOOD GLUCOSE          PHYSICAL EXAM:  General: Awake, alert, oriented X 3. Obese.  HEENT: Atraumatic, normocephalic.   Lymph Nodes: No palpable lymphadenopathy  Neck: No JVD. No carotid bruit.   Respiratory: Normal chest expansion. Clear, although difficult to examine given body habitus  Cardiovascular: S1 S2 normal. No murmurs, rubs or gallops.   Abdomen: Soft, non-tender, non-distended. No organomegaly.  Extremities: Warm to touch. Peripheral pulse palpable. +Pedal edema.   Skin: No rashes or skin lesions  Neurological: Motor and sensory examination equal and normal in all four extremities.  Psychiatry: Appropriate mood and affect.    HOSPITAL MEDICATIONS:  MEDICATIONS  (STANDING):  amLODIPine   Tablet 5 milliGRAM(s) Oral daily  losartan 100 milliGRAM(s) Oral daily  montelukast 10 milliGRAM(s) Oral daily  warfarin 10 milliGRAM(s) Oral once    MEDICATIONS  (PRN):  ALBUTerol    90 MICROgram(s) HFA Inhaler 2 Puff(s) Inhalation every 6 hours PRN Shortness of Breath and/or Wheezing      LABS:                        13.4   6.15  )-----------( 211      ( 2021 07:42 )             46.8     07-14    147<H>  |  104  |  11  ----------------------------<  103<H>  4.1   |  32<H>  |  0.81    Ca    9.1      2021 07:42  Phos  3.2     07-14  Mg     2.30     -14    TPro  7.5  /  Alb  4.5  /  TBili  0.5  /  DBili  x   /  AST  21  /  ALT  32  /  AlkPhos  137<H>  07-12    PT/INR - ( 2021 07:42 )   PT: 20.8 sec;   INR: 1.86 ratio         PTT - ( 2021 07:42 )  PTT:38.5 sec    Arterial Blood Gas:  14 @ 14:15  7.34/68/66/32/93.6/10.0  ABG lactate: --        MICROBIOLOGY:     RADIOLOGY:  [x] Reviewed and interpreted by me,  < from: CT Angio Chest PE Protocol w/ IV Cont (21 @ 01:19) >  EXAM:  CT ANGIO CHEST PULM ART WAWIC        PROCEDURE DATE:  2021         INTERPRETATION:  CLINICAL INFORMATION: Shortness of breath    COMPARISON: None.    CONTRAST/COMPLICATIONS:  IV Contrast: Omnipaque 350  115 cc administered   10 cc discarded  Oral Contrast: NONE  Complications: None reported at time of study completion    PROCEDURE:  CT Angiography of the Chest.  Sagittal and coronal reformats were performed as well as 3D (MIP) reconstructions.    FINDINGS:    LUNGS AND AIRWAYS: Patent central airways.  Bilateral lower lobe basilar and linear subsegmental atelectasis.  PLEURA: No pleural effusion.  MEDIASTINUM AND MELANIE: No lymphadenopathy.  VESSELS: No main, right, left, or lobar pulmonary embolism. Evaluation of segmental and subsegmental pulmonary arteries is limited by respiratory motion.  HEART: Heart size is normal. No pericardial effusion.  CHEST WALL AND LOWER NECK: Within normal limits.  VISUALIZED UPPER ABDOMEN: Hepatic steatosis. Left upper pole renal cyst.  BONES: Degenerative changes    IMPRESSION:  No main, right, left, or lobar pulmonary embolism. Limited evaluation of segmental and subsegmental pulmonary arteries.    No pneumonia.        --- End of Report ---            ALANA FERNANDEZ MD; Resident Interventional Radiology  This document has been electronically signed.  BURKE LILLY MD; Attending Radiologist  This document has been electronically signed. 2021  2:01AM    < end of copied text >      Point of Care Ultrasound Findings;    PFT:    EKG: CC: Dyspnea on exertion    HPI:  57 yo f pmh HTN, PEx2 and LLE DVT on coumadin, p/w VIERA x 2 weeks and now having worsening chest tightness and dyspnea. She started having mild dyspnea on exertion 2 weeks ago and getting progressively worse.. 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 She could barely walk a few feet before having to take a breath. She called PMD who told pt to come to ED. She denies any fever, chills, dizziness, cp, palpitations, wheezing, abdominal pain, n/v/d, dysuria, hematuria, orthopnea or leg edema. She has been complaint with her medication. In the past, she had an episode of chest tightness associated with wheezing and was prescribed Montelukast by her PCP. Did not try albuterol.    In the ED, /87 HR 93 and on some NC saturating 98%. At bedside, patient was saturating 99% on RA. Labs with troponin 8->7. EKG without ischemic changes. CTA chest without main or R/L PE. Currently still having mild chest pressure but states that it feels like indigestion. Overall, improved with just O2. (2021 04:03)    Patient seen and examined at bedside. She reports that for the past 2 weeks she has been having worsening dyspnea on exertion. She initially felt this way climbing up stairs at school. Of note, patient has a known history of thromboembolic disease and has been on Coumadin for many years. She last had a DVT when she was in her 40s. She denies fevers/chills, SOB at rest, and chest pain. She does endorse a cough when talking, but it is not productive. She has never experienced this before. She does have a history of snoring and morning headaches. She denies witnessed apneas.    Of note, patient and family endorse a family history of SLE. She also has a sister who has had PEs and is on Coumadin. She has never had an autoimmune or hypercoagulable workup.    PAST MEDICAL & SURGICAL HISTORY:  HTN (hypertension)    Pulmonary embolism    Acute DVT (deep venous thrombosis)    Morbid obesity    H/O  section        FAMILY HISTORY:  Family history of DVT (Sibling)    FH: HTN (hypertension) (Mother)    FH: type 2 diabetes (Father)        SOCIAL HISTORY:  Smoking: None  EtOH Use: None reported  Occupation: Works at a school  Exposures: None reported  Recent Travel: None reported    Allergies    No Known Allergies    Intolerances        HOME MEDICATIONS:    REVIEW OF SYSTEMS:  Constitutional: No fevers or chills.   Eyes: No itching or discharge from the eyes  ENT: No ear pain. No ear discharge. No nasal congestion.  CV: No chest pain. No palpitations. No lightheadedness or dizziness.   Resp: No dyspnea at rest. +Dyspnea on exertion. No wheezing. +Cough. No stridor. No sputum production. No chest pain with respiration.  GI: No nausea. No vomiting. No diarrhea.  MSK: No joint pain or pain in any extremities  Integumentary: No skin lesions. +Pedal edema.  Neurological: No gross motor weakness. No sensory changes.  [x] 14 point ros negative except as per hpi  [ ] Unable to assess ROS because ________    OBJECTIVE:  ICU Vital Signs Last 24 Hrs  T(C): 37.3 (2021 11:47), Max: 37.3 (2021 11:47)  T(F): 99.2 (2021 11:47), Max: 99.2 (2021 11:47)  HR: 96 (2021 11:47) (83 - 96)  BP: 129/91 (2021 11:47) (122/83 - 129/91)  BP(mean): 105 (2021 18:11) (105 - 105)  ABP: --  ABP(mean): --  RR: 16 (2021 11:47) (16 - 18)  SpO2: 90% (2021 12:15) (80% - 100%)        CAPILLARY BLOOD GLUCOSE          PHYSICAL EXAM:  General: Awake, alert, oriented X 3. Obese.  HEENT: Atraumatic, normocephalic.   Lymph Nodes: No palpable lymphadenopathy  Neck: No JVD. No carotid bruit.   Respiratory: Normal chest expansion. Clear, although difficult to examine given body habitus  Cardiovascular: S1 S2 normal. No murmurs, rubs or gallops.   Abdomen: Soft, non-tender, non-distended. No organomegaly.  Extremities: Warm to touch. Peripheral pulse palpable. +Pedal edema.   Skin: No rashes or skin lesions  Neurological: Motor and sensory examination equal and normal in all four extremities.  Psychiatry: Appropriate mood and affect.    HOSPITAL MEDICATIONS:  MEDICATIONS  (STANDING):  amLODIPine   Tablet 5 milliGRAM(s) Oral daily  losartan 100 milliGRAM(s) Oral daily  montelukast 10 milliGRAM(s) Oral daily  warfarin 10 milliGRAM(s) Oral once    MEDICATIONS  (PRN):  ALBUTerol    90 MICROgram(s) HFA Inhaler 2 Puff(s) Inhalation every 6 hours PRN Shortness of Breath and/or Wheezing      LABS:                        13.4   6.15  )-----------( 211      ( 2021 07:42 )             46.8     07-14    147<H>  |  104  |  11  ----------------------------<  103<H>  4.1   |  32<H>  |  0.81    Ca    9.1      2021 07:42  Phos  3.2     07-14  Mg     2.30     -14    TPro  7.5  /  Alb  4.5  /  TBili  0.5  /  DBili  x   /  AST  21  /  ALT  32  /  AlkPhos  137<H>  07-12    PT/INR - ( 2021 07:42 )   PT: 20.8 sec;   INR: 1.86 ratio         PTT - ( 2021 07:42 )  PTT:38.5 sec    Arterial Blood Gas:   @ 14:15  7.34/68/66/32/93.6/10.0  ABG lactate: --        MICROBIOLOGY:     RADIOLOGY:  [x] Reviewed and interpreted by me,  < from: CT Angio Chest PE Protocol w/ IV Cont (21 @ 01:19) >  EXAM:  CT ANGIO CHEST PULM ART WAWIC        PROCEDURE DATE:  2021         INTERPRETATION:  CLINICAL INFORMATION: Shortness of breath    COMPARISON: None.    CONTRAST/COMPLICATIONS:  IV Contrast: Omnipaque 350  115 cc administered   10 cc discarded  Oral Contrast: NONE  Complications: None reported at time of study completion    PROCEDURE:  CT Angiography of the Chest.  Sagittal and coronal reformats were performed as well as 3D (MIP) reconstructions.    FINDINGS:    LUNGS AND AIRWAYS: Patent central airways.  Bilateral lower lobe basilar and linear subsegmental atelectasis.  PLEURA: No pleural effusion.  MEDIASTINUM AND MELANIE: No lymphadenopathy.  VESSELS: No main, right, left, or lobar pulmonary embolism. Evaluation of segmental and subsegmental pulmonary arteries is limited by respiratory motion.  HEART: Heart size is normal. No pericardial effusion.  CHEST WALL AND LOWER NECK: Within normal limits.  VISUALIZED UPPER ABDOMEN: Hepatic steatosis. Left upper pole renal cyst.  BONES: Degenerative changes    IMPRESSION:  No main, right, left, or lobar pulmonary embolism. Limited evaluation of segmental and subsegmental pulmonary arteries.    No pneumonia.        --- End of Report ---            ALANA FERNANDEZ MD; Resident Interventional Radiology  This document has been electronically signed.  BURKE LILLY MD; Attending Radiologist  This document has been electronically signed. 2021  2:01AM    < end of copied text >      Point of Care Ultrasound Findings;    PFT:    EKG:

## 2021-07-15 LAB
ANION GAP SERPL CALC-SCNC: 13 MMOL/L — SIGNIFICANT CHANGE UP (ref 7–14)
APTT BLD: 89.7 SEC — HIGH (ref 27–36.3)
BUN SERPL-MCNC: 10 MG/DL — SIGNIFICANT CHANGE UP (ref 7–23)
CALCIUM SERPL-MCNC: 9.2 MG/DL — SIGNIFICANT CHANGE UP (ref 8.4–10.5)
CHLORIDE SERPL-SCNC: 103 MMOL/L — SIGNIFICANT CHANGE UP (ref 98–107)
CO2 SERPL-SCNC: 31 MMOL/L — SIGNIFICANT CHANGE UP (ref 22–31)
CREAT SERPL-MCNC: 0.78 MG/DL — SIGNIFICANT CHANGE UP (ref 0.5–1.3)
GLUCOSE SERPL-MCNC: 98 MG/DL — SIGNIFICANT CHANGE UP (ref 70–99)
HCT VFR BLD CALC: 47.7 % — HIGH (ref 34.5–45)
HCT VFR BLD CALC: 48.7 % — HIGH (ref 34.5–45)
HGB BLD-MCNC: 13.9 G/DL — SIGNIFICANT CHANGE UP (ref 11.5–15.5)
HGB BLD-MCNC: 14.1 G/DL — SIGNIFICANT CHANGE UP (ref 11.5–15.5)
INR BLD: 1.71 RATIO — HIGH (ref 0.88–1.16)
MAGNESIUM SERPL-MCNC: 2.4 MG/DL — SIGNIFICANT CHANGE UP (ref 1.6–2.6)
MCHC RBC-ENTMCNC: 27.8 PG — SIGNIFICANT CHANGE UP (ref 27–34)
MCHC RBC-ENTMCNC: 27.9 PG — SIGNIFICANT CHANGE UP (ref 27–34)
MCHC RBC-ENTMCNC: 29 GM/DL — LOW (ref 32–36)
MCHC RBC-ENTMCNC: 29.1 GM/DL — LOW (ref 32–36)
MCV RBC AUTO: 95.8 FL — SIGNIFICANT CHANGE UP (ref 80–100)
MCV RBC AUTO: 95.9 FL — SIGNIFICANT CHANGE UP (ref 80–100)
NRBC # BLD: 0 /100 WBCS — SIGNIFICANT CHANGE UP
NRBC # BLD: 0 /100 WBCS — SIGNIFICANT CHANGE UP
NRBC # FLD: 0 K/UL — SIGNIFICANT CHANGE UP
NRBC # FLD: 0 K/UL — SIGNIFICANT CHANGE UP
PHOSPHATE SERPL-MCNC: 3.3 MG/DL — SIGNIFICANT CHANGE UP (ref 2.5–4.5)
PLATELET # BLD AUTO: 220 K/UL — SIGNIFICANT CHANGE UP (ref 150–400)
PLATELET # BLD AUTO: 226 K/UL — SIGNIFICANT CHANGE UP (ref 150–400)
POTASSIUM SERPL-MCNC: 4.3 MMOL/L — SIGNIFICANT CHANGE UP (ref 3.5–5.3)
POTASSIUM SERPL-SCNC: 4.3 MMOL/L — SIGNIFICANT CHANGE UP (ref 3.5–5.3)
PROTHROM AB SERPL-ACNC: 19 SEC — HIGH (ref 10.6–13.6)
RBC # BLD: 4.98 M/UL — SIGNIFICANT CHANGE UP (ref 3.8–5.2)
RBC # BLD: 5.08 M/UL — SIGNIFICANT CHANGE UP (ref 3.8–5.2)
RBC # FLD: 14.8 % — HIGH (ref 10.3–14.5)
RBC # FLD: 14.9 % — HIGH (ref 10.3–14.5)
SODIUM SERPL-SCNC: 147 MMOL/L — HIGH (ref 135–145)
WBC # BLD: 6.05 K/UL — SIGNIFICANT CHANGE UP (ref 3.8–10.5)
WBC # BLD: 6.99 K/UL — SIGNIFICANT CHANGE UP (ref 3.8–10.5)
WBC # FLD AUTO: 6.05 K/UL — SIGNIFICANT CHANGE UP (ref 3.8–10.5)
WBC # FLD AUTO: 6.99 K/UL — SIGNIFICANT CHANGE UP (ref 3.8–10.5)

## 2021-07-15 PROCEDURE — 99233 SBSQ HOSP IP/OBS HIGH 50: CPT | Mod: GC

## 2021-07-15 RX ORDER — HEPARIN SODIUM 5000 [USP'U]/ML
INJECTION INTRAVENOUS; SUBCUTANEOUS
Qty: 25000 | Refills: 0 | Status: DISCONTINUED | OUTPATIENT
Start: 2021-07-15 | End: 2021-07-16

## 2021-07-15 RX ORDER — HEPARIN SODIUM 5000 [USP'U]/ML
5000 INJECTION INTRAVENOUS; SUBCUTANEOUS EVERY 6 HOURS
Refills: 0 | Status: DISCONTINUED | OUTPATIENT
Start: 2021-07-15 | End: 2021-07-16

## 2021-07-15 RX ORDER — FUROSEMIDE 40 MG
40 TABLET ORAL DAILY
Refills: 0 | Status: DISCONTINUED | OUTPATIENT
Start: 2021-07-15 | End: 2021-07-19

## 2021-07-15 RX ORDER — HEPARIN SODIUM 5000 [USP'U]/ML
10000 INJECTION INTRAVENOUS; SUBCUTANEOUS EVERY 6 HOURS
Refills: 0 | Status: DISCONTINUED | OUTPATIENT
Start: 2021-07-15 | End: 2021-07-16

## 2021-07-15 RX ADMIN — HEPARIN SODIUM 2400 UNIT(S)/HR: 5000 INJECTION INTRAVENOUS; SUBCUTANEOUS at 20:37

## 2021-07-15 RX ADMIN — HEPARIN SODIUM 2400 UNIT(S)/HR: 5000 INJECTION INTRAVENOUS; SUBCUTANEOUS at 13:28

## 2021-07-15 RX ADMIN — AMLODIPINE BESYLATE 5 MILLIGRAM(S): 2.5 TABLET ORAL at 05:57

## 2021-07-15 RX ADMIN — LOSARTAN POTASSIUM 100 MILLIGRAM(S): 100 TABLET, FILM COATED ORAL at 05:57

## 2021-07-15 RX ADMIN — MONTELUKAST 10 MILLIGRAM(S): 4 TABLET, CHEWABLE ORAL at 13:29

## 2021-07-15 RX ADMIN — Medication 40 MILLIGRAM(S): at 09:47

## 2021-07-15 NOTE — PROGRESS NOTE ADULT - PROBLEM SELECTOR PLAN 3
TTE unremarkable, preserved EF  -Considering V/Q scan-hold for now, will need RHC first  - Hold coumadin in anticipation of RHC, may need to bridge with heparin gtt in meanttime  Per patient INR on 7/10 was supratherapeutic at 3.4 and was told by LIZETTE Kirkpatrick at PCP office to hold her coumadin that day, she had also held it on 7/5 for similar reason

## 2021-07-15 NOTE — PROGRESS NOTE ADULT - ASSESSMENT
56 year-old morbidly obese F with PMH of PE on Coumadin who presented to the hospital with worsening shortness of breath and desaturation on ambulation. Pulmonary consulted for evaluation of hypoxia in the setting of previous thromboembolic disease.    Dyspnea on Exertion  - Potentially in the setting of pulmonary hypertension, possibly due to OLAMIDE/OHS (nocturnal hypoxia) vs CTEPH given history of thromboembolic disease  - CTA without evidence of acute PE   - Given inability to ascertain pulmonary pressures on TTE, patient would benefit from a RHC to r/o pulmonary hypertension. Discussed with cardiology  - Would hold off on V/Q scan pending evaluation for pHTN  - Patient will need to follow up outpatient with pulmonary office for PFTs and sleep study  - Patient will likely require oxygen on discharge  - Agree with continuing diuresis as patient has significant pedal edema, optimize volume status as much as possible prior to catheterization  - Follow up autoimmune workup sent  - Weight loss will be important going forward in management of symptoms as well    Robin Damon, PGY-6  Pulmonary and Critical Care Medicine  [phone]

## 2021-07-15 NOTE — PROGRESS NOTE ADULT - SUBJECTIVE AND OBJECTIVE BOX
INCOMPLETE [facility] Division of Hospital Medicine  Suzanna Man DO  Pager (ALLY-F, 8A-5P): [phone]      Patient is a 56y old  Female who presents with a chief complaint of Dyspnea and chest pressure (15 Jul 2021 10:35)      SUBJECTIVE / OVERNIGHT EVENTS: No acute events overnight. Still with SOB on exertion. No chest pain, reports urinating "a lot" after lasix. No N/V/D    MEDICATIONS  (STANDING):  amLODIPine   Tablet 5 milliGRAM(s) Oral daily  furosemide   Injectable 40 milliGRAM(s) IV Push daily  heparin  Infusion.  Unit(s)/Hr (24 mL/Hr) IV Continuous <Continuous>  losartan 100 milliGRAM(s) Oral daily  montelukast 10 milliGRAM(s) Oral daily    MEDICATIONS  (PRN):  ALBUTerol    90 MICROgram(s) HFA Inhaler 2 Puff(s) Inhalation every 6 hours PRN Shortness of Breath and/or Wheezing  heparin   Injectable 67948 Unit(s) IV Push every 6 hours PRN For aPTT less than 40  heparin   Injectable 5000 Unit(s) IV Push every 6 hours PRN For aPTT between 40 - 57      CAPILLARY BLOOD GLUCOSE        I&O's Summary    14 Jul 2021 07:01  -  15 Jul 2021 07:00  --------------------------------------------------------  IN: 960 mL / OUT: 0 mL / NET: 960 mL        PHYSICAL EXAM:  Vital Signs Last 24 Hrs  T(C): 36.9 (15 Jul 2021 05:54), Max: 37.1 (14 Jul 2021 17:39)  T(F): 98.4 (15 Jul 2021 05:54), Max: 98.7 (14 Jul 2021 17:39)  HR: 77 (15 Jul 2021 05:54) (77 - 87)  BP: 122/62 (15 Jul 2021 05:54) (122/62 - 145/85)  BP(mean): --  RR: 18 (15 Jul 2021 05:54) (17 - 18)  SpO2: 57% (15 Jul 2021 09:07) (57% - 100%)    CONSTITUTIONAL: NAD, on 3L NC, morbidly obese  HEENT: sclera clear, MMM, eyes tracking  RESPIRATORY: Normal respiratory effort; lungs are clear to auscultation bilaterally  CARDIOVASCULAR: S1/S2, RRR, no murmurs appreciated; + 2 pitting edema b/l LE  ABDOMEN: soft, Nontender, nondistended, no rebound/guarding  PSYCH: calm, cooperative  NEUROLOGY: awake, alert, no gross deficits, moving all extremities  SKIN: hyperpigmentation of LLE>RLE    LABS:                        14.1   6.05  )-----------( 220      ( 15 Jul 2021 07:22 )             48.7     07-15    147<H>  |  103  |  10  ----------------------------<  98  4.3   |  31  |  0.78    Ca    9.2      15 Jul 2021 07:22  Phos  3.3     07-15  Mg     2.40     07-15      PT/INR - ( 15 Jul 2021 07:22 )   PT: 19.0 sec;   INR: 1.71 ratio         PTT - ( 14 Jul 2021 07:42 )  PTT:38.5 sec            RADIOLOGY & ADDITIONAL TESTS:  Results Reviewed:   Imaging Personally Reviewed:  Electrocardiogram Personally Reviewed:    COORDINATION OF CARE:  Care Discussed with Consultants/Other Providers [Y/N]:  Prior or Outpatient Records Reviewed [Y/N]:

## 2021-07-15 NOTE — PROGRESS NOTE ADULT - ASSESSMENT
56 yr old woman PMH HTN, PEx2 and LLE DVT on coumadin, p/w progressively worsening VIERA x 2 weeks with associated chest tightness. Found to have acute hypoxic respiratory failure now on NC, unclear etiology, r/o reactive airway disease vs pulmonary HTN vs CTEPH, in addition to possible OHS, OLAMIDE.

## 2021-07-15 NOTE — PROGRESS NOTE ADULT - PROBLEM SELECTOR PLAN 5
FENP: No IVF, lytes PRN, low fat diet, AC with coumadin  Dispo: pending RHC FENP: No IVF, lytes PRN, low fat diet, AC with coumadin  Dispo: pending RHC  Discussed plan with patient, ACP and CM updated for home O2

## 2021-07-15 NOTE — PROGRESS NOTE ADULT - SUBJECTIVE AND OBJECTIVE BOX
CHIEF COMPLAINT: Dyspnea and chest pressure    Interval Events: Feels well at rest, still with desaturation and chest tightness after ambulation.    REVIEW OF SYSTEMS:  Constitutional: No fevers or chills.  Eyes: No itching or discharge from the eyes  ENT: No ear pain. No ear discharge. No nasal congestion.  CV: No chest pain. No palpitations. No lightheadedness or dizziness.   Resp: No dyspnea at rest. +Dyspnea on exertion. No wheezing. No cough.  GI: No nausea. No vomiting. No diarrhea.  MSK: No joint pain or pain in any extremities  Integumentary: No skin lesions. +Pedal edema.  Neurological: No gross motor weakness. No sensory changes.  [x] All other systems negative  [ ] Unable to assess ROS because ________    OBJECTIVE:  ICU Vital Signs Last 24 Hrs  T(C): 36.9 (15 Jul 2021 05:54), Max: 37.3 (14 Jul 2021 11:47)  T(F): 98.4 (15 Jul 2021 05:54), Max: 99.2 (14 Jul 2021 11:47)  HR: 77 (15 Jul 2021 05:54) (77 - 96)  BP: 122/62 (15 Jul 2021 05:54) (122/62 - 145/85)  BP(mean): --  ABP: --  ABP(mean): --  RR: 18 (15 Jul 2021 05:54) (16 - 18)  SpO2: 57% (15 Jul 2021 09:07) (57% - 100%)    07-14 @ 07:01  -  07-15 @ 07:00  --------------------------------------------------------  IN: 960 mL / OUT: 0 mL / NET: 960 mL    CAPILLARY BLOOD GLUCOSE    PHYSICAL EXAM:  General: Awake, alert, oriented X 3.   HEENT: Atraumatic, normocephalic.   Lymph Nodes: No palpable lymphadenopathy  Neck: No JVD. No carotid bruit.   Respiratory: Normal chest expansion. Clear bilaterally.  Cardiovascular: S1 S2 normal. No murmurs, rubs or gallops.   Abdomen: Soft, non-tender, non-distended. No organomegaly.  Extremities: Warm to touch. Peripheral pulse palpable. +Pedal edema.   Skin: No rashes or skin lesions  Neurological: Motor and sensory examination equal and normal in all four extremities.  Psychiatry: Appropriate mood and affect.    HOSPITAL MEDICATIONS:  MEDICATIONS  (STANDING):  amLODIPine   Tablet 5 milliGRAM(s) Oral daily  furosemide   Injectable 40 milliGRAM(s) IV Push daily  losartan 100 milliGRAM(s) Oral daily  montelukast 10 milliGRAM(s) Oral daily    MEDICATIONS  (PRN):  ALBUTerol    90 MICROgram(s) HFA Inhaler 2 Puff(s) Inhalation every 6 hours PRN Shortness of Breath and/or Wheezing      LABS:                        14.1   6.05  )-----------( 220      ( 15 Jul 2021 07:22 )             48.7     07-15    147<H>  |  103  |  10  ----------------------------<  98  4.3   |  31  |  0.78    Ca    9.2      15 Jul 2021 07:22  Phos  3.3     07-15  Mg     2.40     07-15      PT/INR - ( 15 Jul 2021 07:22 )   PT: 19.0 sec;   INR: 1.71 ratio         PTT - ( 14 Jul 2021 07:42 )  PTT:38.5 sec    Arterial Blood Gas:  07-14 @ 14:15  7.34/68/66/32/93.6/10.0  ABG lactate: --        MICROBIOLOGY:     RADIOLOGY:  [ ] Reviewed and interpreted by me    Point of Care Ultrasound Findings:    PFT:    EKG:

## 2021-07-15 NOTE — PROGRESS NOTE ADULT - PROBLEM SELECTOR PLAN 1
Ddx pulm HTN, CTEPH vs reactive airway disease, OHS, OLAMIDE  - Patient reports significant 2nd hand smoking exposure from ex   - COVID negative, TTE with preserved EF: 61% and grossly normal LVSF, unable to accurately assess RVSF  - CTPA: No main, right, left, or lobar PE. Limited evaluation of segmental and subsegmental pulmonary arteries  - Desats to 91 RA at rest and 80 on RA with ambulation. Will need home O2 set up  - ABG reviewed, pulm recs appreciated  - Continue Lasix 40 IVP qd per pulm and cardiology recs, I/Os, daily weights  - Cardiology consulted for RHC to evaluate pulmonary pressures  - Albuterol PRN  - Recommended outpatient PFTs and sleep study Ddx pulm HTN, CTEPH vs reactive airway disease, OHS, OLAMIDE  - Patient reports significant 2nd hand smoking exposure from ex   - COVID negative, TTE with preserved EF: 61% and grossly normal LVSF, unable to accurately assess RVSF  - CTPA: No main, right, left, or lobar PE. Limited evaluation of segmental and subsegmental pulmonary arteries  - Desats to 91 RA at rest and 80 on RA with ambulation. Will need home O2 set up  - ABG reviewed, pulm recs appreciated  - Continue Lasix 40 IVP qd per pulm and cardiology recs, strict I/Os, daily weights  - Cardiology consulted for RHC to evaluate pulmonary pressures.  - Albuterol PRN  - Recommended outpatient PFTs and sleep study

## 2021-07-15 NOTE — CHART NOTE - NSCHARTNOTEFT_GEN_A_CORE
Reason for consult: RHC  56 year-old morbidly obese F with PMH of PE on Coumadin who presented to the hospital with worsening shortness of breath and desaturation on ambulation. Pulmonary evaluating hypoxia in the setting of previous thromboembolic disease. Reason for consult: RHC  56 year-old morbidly obese F with PMH of PE on Coumadin who presented to the hospital with worsening shortness of breath and desaturation on ambulation. Pulmonary evaluating hypoxia in the setting of previous thromboembolic disease. Discussed case with pulmonary. Will defer cath until either tomorrow or next week. Would continue diuresis as per their recs.    Jimy Vargas MD  Cardiology Fellow PGY-5  [facility]    For all consults and questions:  [email or web address]   Login: [email or web address]

## 2021-07-16 LAB
ANION GAP SERPL CALC-SCNC: 12 MMOL/L — SIGNIFICANT CHANGE UP (ref 7–14)
APTT BLD: 101.5 SEC — HIGH (ref 27–36.3)
APTT BLD: 105.7 SEC — HIGH (ref 27–36.3)
APTT BLD: 95.5 SEC — HIGH (ref 27–36.3)
BUN SERPL-MCNC: 11 MG/DL — SIGNIFICANT CHANGE UP (ref 7–23)
CALCIUM SERPL-MCNC: 9.6 MG/DL — SIGNIFICANT CHANGE UP (ref 8.4–10.5)
CHLORIDE SERPL-SCNC: 101 MMOL/L — SIGNIFICANT CHANGE UP (ref 98–107)
CO2 SERPL-SCNC: 32 MMOL/L — HIGH (ref 22–31)
CREAT SERPL-MCNC: 0.79 MG/DL — SIGNIFICANT CHANGE UP (ref 0.5–1.3)
DSDNA AB SER-ACNC: <12 IU/ML — SIGNIFICANT CHANGE UP
GLUCOSE SERPL-MCNC: 100 MG/DL — HIGH (ref 70–99)
HCT VFR BLD CALC: 49.1 % — HIGH (ref 34.5–45)
HGB BLD-MCNC: 14.1 G/DL — SIGNIFICANT CHANGE UP (ref 11.5–15.5)
MAGNESIUM SERPL-MCNC: 2.3 MG/DL — SIGNIFICANT CHANGE UP (ref 1.6–2.6)
MCHC RBC-ENTMCNC: 27.4 PG — SIGNIFICANT CHANGE UP (ref 27–34)
MCHC RBC-ENTMCNC: 28.7 GM/DL — LOW (ref 32–36)
MCV RBC AUTO: 95.5 FL — SIGNIFICANT CHANGE UP (ref 80–100)
MPO AB + PR3 PNL SER: SIGNIFICANT CHANGE UP
NRBC # BLD: 0 /100 WBCS — SIGNIFICANT CHANGE UP
NRBC # FLD: 0 K/UL — SIGNIFICANT CHANGE UP
PHOSPHATE SERPL-MCNC: 2.6 MG/DL — SIGNIFICANT CHANGE UP (ref 2.5–4.5)
PLATELET # BLD AUTO: 227 K/UL — SIGNIFICANT CHANGE UP (ref 150–400)
POTASSIUM SERPL-MCNC: 4.3 MMOL/L — SIGNIFICANT CHANGE UP (ref 3.5–5.3)
POTASSIUM SERPL-SCNC: 4.3 MMOL/L — SIGNIFICANT CHANGE UP (ref 3.5–5.3)
RBC # BLD: 5.14 M/UL — SIGNIFICANT CHANGE UP (ref 3.8–5.2)
RBC # FLD: 14.6 % — HIGH (ref 10.3–14.5)
SODIUM SERPL-SCNC: 145 MMOL/L — SIGNIFICANT CHANGE UP (ref 135–145)
WBC # BLD: 5.33 K/UL — SIGNIFICANT CHANGE UP (ref 3.8–10.5)
WBC # FLD AUTO: 5.33 K/UL — SIGNIFICANT CHANGE UP (ref 3.8–10.5)

## 2021-07-16 PROCEDURE — 99233 SBSQ HOSP IP/OBS HIGH 50: CPT | Mod: GC

## 2021-07-16 PROCEDURE — 99233 SBSQ HOSP IP/OBS HIGH 50: CPT

## 2021-07-16 RX ORDER — HEPARIN SODIUM 5000 [USP'U]/ML
1900 INJECTION INTRAVENOUS; SUBCUTANEOUS
Qty: 25000 | Refills: 0 | Status: DISCONTINUED | OUTPATIENT
Start: 2021-07-16 | End: 2021-07-19

## 2021-07-16 RX ORDER — HEPARIN SODIUM 5000 [USP'U]/ML
10000 INJECTION INTRAVENOUS; SUBCUTANEOUS EVERY 6 HOURS
Refills: 0 | Status: DISCONTINUED | OUTPATIENT
Start: 2021-07-16 | End: 2021-07-19

## 2021-07-16 RX ORDER — HEPARIN SODIUM 5000 [USP'U]/ML
5000 INJECTION INTRAVENOUS; SUBCUTANEOUS EVERY 6 HOURS
Refills: 0 | Status: DISCONTINUED | OUTPATIENT
Start: 2021-07-16 | End: 2021-07-19

## 2021-07-16 RX ADMIN — HEPARIN SODIUM 1900 UNIT(S)/HR: 5000 INJECTION INTRAVENOUS; SUBCUTANEOUS at 19:23

## 2021-07-16 RX ADMIN — AMLODIPINE BESYLATE 5 MILLIGRAM(S): 2.5 TABLET ORAL at 06:11

## 2021-07-16 RX ADMIN — HEPARIN SODIUM 2100 UNIT(S)/HR: 5000 INJECTION INTRAVENOUS; SUBCUTANEOUS at 03:15

## 2021-07-16 RX ADMIN — HEPARIN SODIUM 2100 UNIT(S)/HR: 5000 INJECTION INTRAVENOUS; SUBCUTANEOUS at 10:46

## 2021-07-16 RX ADMIN — Medication 40 MILLIGRAM(S): at 06:12

## 2021-07-16 RX ADMIN — MONTELUKAST 10 MILLIGRAM(S): 4 TABLET, CHEWABLE ORAL at 12:10

## 2021-07-16 RX ADMIN — LOSARTAN POTASSIUM 100 MILLIGRAM(S): 100 TABLET, FILM COATED ORAL at 06:11

## 2021-07-16 NOTE — PROGRESS NOTE ADULT - PROBLEM SELECTOR PROBLEM 2
Morbid obesity Chronic pulmonary embolism, unspecified pulmonary embolism type, unspecified whether acute cor pulmonale present

## 2021-07-16 NOTE — PROGRESS NOTE ADULT - PROBLEM SELECTOR PLAN 3
TTE unremarkable, preserved EF  -Considering V/Q scan-hold for now, will need RHC first  - Hold coumadin in anticipation of RHC, may need to bridge with heparin gtt in meanttime  Per patient INR on 7/10 was supratherapeutic at 3.4 and was told by LIZETTE Kirkpatrick at PCP office to hold her coumadin that day, she had also held it on 7/5 for similar reason Chronic. Outpatient management  weight loss advised

## 2021-07-16 NOTE — PROGRESS NOTE ADULT - PROBLEM SELECTOR PROBLEM 3
Chronic pulmonary embolism, unspecified pulmonary embolism type, unspecified whether acute cor pulmonale present Morbid obesity

## 2021-07-16 NOTE — PROGRESS NOTE ADULT - ASSESSMENT
56 yr old woman PMH HTN, PEx2 and LLE DVT on coumadin, p/w progressively worsening VIERA x 2 weeks with associated chest tightness. Found to have acute hypoxic respiratory failure now on NC, unclear etiology, r/o reactive airway disease vs pulmonary HTN vs CTEPH, in addition to possible OHS, OLAMIDE. 56 yr old woman PMH HTN, PEx2 and LLE DVT on coumadin, p/w progressively worsening VIERA x 2 weeks with associated chest tightness. Found to have acute hypoxic respiratory failure now on NC, unclear etiology, r/o reactive airway disease vs pulmonary HTN vs CTEPH, in addition to possible OHS, OLAMIDE. Pending RHC

## 2021-07-16 NOTE — PROVIDER CONTACT NOTE (OTHER) - ASSESSMENT
Patient has been desatsto 70's but is not sustaining. Patient desats while sleeping. Patient is asymptomatic. Denies SOB and chest pain

## 2021-07-16 NOTE — PROGRESS NOTE ADULT - ASSESSMENT
56 year-old morbidly obese F with PMH of PE on Coumadin who presented to the hospital with worsening shortness of breath and desaturation on ambulation. Pulmonary consulted for evaluation of hypoxia in the setting of previous thromboembolic disease.    Dyspnea on Exertion  - Potentially in the setting of pulmonary hypertension, possibly due to OLAMIDE/OHS (nocturnal hypoxia) vs CTEPH given history of thromboembolic disease  - CTA without evidence of acute PE   - Given inability to ascertain pulmonary pressures on TTE, patient would benefit from a RHC to r/o pulmonary hypertension. Discussed with cardiology  - Would hold off on V/Q scan pending evaluation for pHTN  - Patient will need to follow up outpatient with pulmonary office for PFTs and sleep study  - Patient will require oxygen on discharge  - Agree with continuing diuresis as patient has significant pedal edema, optimize volume status as much as possible   - Follow up autoimmune workup    Robin Damon, PGY-6  Pulmonary and Critical Care Medicine  [phone]

## 2021-07-16 NOTE — PROGRESS NOTE ADULT - SUBJECTIVE AND OBJECTIVE BOX
CHIEF COMPLAINT: Dyspnea and chest pressure    Interval Events: No acute events, reports feeling well.    REVIEW OF SYSTEMS:  Constitutional: No fevers or chills. No weight loss.  Eyes: No itching or discharge from the eyes  ENT: No ear pain. No ear discharge. No nasal congestion.   CV: No chest pain. No palpitations. No lightheadedness or dizziness.   Resp: No dyspnea at rest. +Dyspnea on exertion.  GI: No nausea. No vomiting. No diarrhea.  MSK: No joint pain or pain in any extremities  Integumentary: No skin lesions. +Pedal edema.  Neurological: No gross motor weakness. No sensory changes.  [x] All other systems negative  [ ] Unable to assess ROS because ________    OBJECTIVE:  ICU Vital Signs Last 24 Hrs  T(C): 36.8 (16 Jul 2021 06:09), Max: 36.9 (16 Jul 2021 01:13)  T(F): 98.2 (16 Jul 2021 06:09), Max: 98.5 (16 Jul 2021 01:13)  HR: 86 (16 Jul 2021 06:09) (75 - 86)  BP: 127/77 (16 Jul 2021 06:09) (127/71 - 135/65)  BP(mean): --  ABP: --  ABP(mean): --  RR: 16 (16 Jul 2021 06:09) (16 - 17)  SpO2: 95% (16 Jul 2021 06:09) (95% - 98%)        07-15 @ 07:01  -  07-16 @ 07:00  --------------------------------------------------------  IN: 120 mL / OUT: 250 mL / NET: -130 mL      CAPILLARY BLOOD GLUCOSE          PHYSICAL EXAM:  General: Awake, alert, oriented X 3.   HEENT: Atraumatic, normocephalic.   Lymph Nodes: No palpable lymphadenopathy  Neck: No JVD. No carotid bruit.   Respiratory: Normal chest expansion. Clear to auscultation bilaterally.  Cardiovascular: S1 S2 normal. No murmurs, rubs or gallops.   Abdomen: Soft, non-tender, non-distended. No organomegaly.  Extremities: Warm to touch. Peripheral pulse palpable. +Pedal edema.   Skin: No rashes or skin lesions  Neurological: Motor and sensory examination equal and normal in all four extremities.  Psychiatry: Appropriate mood and affect.    HOSPITAL MEDICATIONS:  MEDICATIONS  (STANDING):  amLODIPine   Tablet 5 milliGRAM(s) Oral daily  furosemide   Injectable 40 milliGRAM(s) IV Push daily  heparin  Infusion.  Unit(s)/Hr (24 mL/Hr) IV Continuous <Continuous>  losartan 100 milliGRAM(s) Oral daily  montelukast 10 milliGRAM(s) Oral daily    MEDICATIONS  (PRN):  ALBUTerol    90 MICROgram(s) HFA Inhaler 2 Puff(s) Inhalation every 6 hours PRN Shortness of Breath and/or Wheezing  heparin   Injectable 57876 Unit(s) IV Push every 6 hours PRN For aPTT less than 40  heparin   Injectable 5000 Unit(s) IV Push every 6 hours PRN For aPTT between 40 - 57      LABS:                        14.1   5.33  )-----------( 227      ( 16 Jul 2021 08:19 )             49.1     07-16    145  |  101  |  11  ----------------------------<  100<H>  4.3   |  32<H>  |  0.79    Ca    9.6      16 Jul 2021 08:19  Phos  2.6     07-16  Mg     2.30     07-16      PT/INR - ( 15 Jul 2021 07:22 )   PT: 19.0 sec;   INR: 1.71 ratio         PTT - ( 16 Jul 2021 09:38 )  PTT:95.5 sec    Arterial Blood Gas:  07-14 @ 14:15  7.34/68/66/32/93.6/10.0  ABG lactate: --        MICROBIOLOGY:     RADIOLOGY:  [ ] Reviewed and interpreted by me    Point of Care Ultrasound Findings:    PFT:    EKG:

## 2021-07-16 NOTE — PROVIDER CONTACT NOTE (OTHER) - ACTION/TREATMENT ORDERED:
Provider gave verbal orders to increase oxygen to 3 Liters Nasal Cannula. Repeated back to provider.

## 2021-07-16 NOTE — PROGRESS NOTE ADULT - SUBJECTIVE AND OBJECTIVE BOX
[facility] Division of Hospital Medicine  Suzanna Man DO  Pager (YARONF, 8A-5P): [phone]      Patient is a 56y old  Female who presents with a chief complaint of Dyspnea and chest pressure (16 Jul 2021 08:34)      SUBJECTIVE / OVERNIGHT EVENTS: No acute events overnight. Denies chest pain, feels SOB is better and is urinating alot. Denies N/V/D.    MEDICATIONS  (STANDING):  amLODIPine   Tablet 5 milliGRAM(s) Oral daily  furosemide   Injectable 40 milliGRAM(s) IV Push daily  heparin  Infusion.  Unit(s)/Hr (24 mL/Hr) IV Continuous <Continuous>  losartan 100 milliGRAM(s) Oral daily  montelukast 10 milliGRAM(s) Oral daily    MEDICATIONS  (PRN):  ALBUTerol    90 MICROgram(s) HFA Inhaler 2 Puff(s) Inhalation every 6 hours PRN Shortness of Breath and/or Wheezing  heparin   Injectable 60536 Unit(s) IV Push every 6 hours PRN For aPTT less than 40  heparin   Injectable 5000 Unit(s) IV Push every 6 hours PRN For aPTT between 40 - 57      CAPILLARY BLOOD GLUCOSE        I&O's Summary    15 Jul 2021 07:01  -  16 Jul 2021 07:00  --------------------------------------------------------  IN: 120 mL / OUT: 250 mL / NET: -130 mL        PHYSICAL EXAM:  Vital Signs Last 24 Hrs  T(C): 36.8 (16 Jul 2021 06:09), Max: 36.9 (16 Jul 2021 01:13)  T(F): 98.2 (16 Jul 2021 06:09), Max: 98.5 (16 Jul 2021 01:13)  HR: 86 (16 Jul 2021 06:09) (75 - 86)  BP: 127/77 (16 Jul 2021 06:09) (127/71 - 135/65)  BP(mean): --  RR: 16 (16 Jul 2021 06:09) (16 - 17)  SpO2: 95% (16 Jul 2021 06:09) (95% - 98%)    CONSTITUTIONAL: NAD, on NC, morbidly obese  HEENT: sclera clear, MMM, eyes tracking  RESPIRATORY: Normal respiratory effort; lungs are clear to auscultation bilaterally  CARDIOVASCULAR: S1/S2, RRR, no murmurs appreciated; + 2 pitting edema b/l LE slowly improving  ABDOMEN: soft, Nontender, nondistended, no rebound/guarding  PSYCH: calm, cooperative  NEUROLOGY: awake, alert, no gross deficits, moving all extremities  SKIN: hyperpigmentation of LLE>RLE    LABS:                        14.1   5.33  )-----------( 227      ( 16 Jul 2021 08:19 )             49.1     07-16    145  |  101  |  11  ----------------------------<  100<H>  4.3   |  32<H>  |  0.79    Ca    9.6      16 Jul 2021 08:19  Phos  2.6     07-16  Mg     2.30     07-16      PT/INR - ( 15 Jul 2021 07:22 )   PT: 19.0 sec;   INR: 1.71 ratio         PTT - ( 16 Jul 2021 09:38 )  PTT:95.5 sec            RADIOLOGY & ADDITIONAL TESTS:  Results Reviewed:   Imaging Personally Reviewed:  Electrocardiogram Personally Reviewed:    COORDINATION OF CARE:  Care Discussed with Consultants/Other Providers [Y/N]:  Prior or Outpatient Records Reviewed [Y/N]:

## 2021-07-16 NOTE — PROGRESS NOTE ADULT - PROBLEM SELECTOR PLAN 1
Ddx pulm HTN, CTEPH vs reactive airway disease, OHS, OLAMIDE  - Patient reports significant 2nd hand smoking exposure from ex   - COVID negative, TTE with preserved EF: 61% and grossly normal LVSF, unable to accurately assess RVSF  - CTPA: No main, right, left, or lobar PE. Limited evaluation of segmental and subsegmental pulmonary arteries  - Desats to 91 RA at rest and 80 on RA with ambulation. Will need home O2 set up  - ABG reviewed, pulm recs appreciated  - Continue Lasix 40 IVP qd per pulm and cardiology recs, strict I/Os, daily weights  - Cardiology consulted for RHC to evaluate pulmonary pressures.  - Albuterol PRN  - Recommended outpatient PFTs and sleep study Ddx pulm HTN, CTEPH vs reactive airway disease, OHS, OLAMIDE, h/o 2nd hand smoking exposure from ex   - COVID negative, TTE with preserved EF: 61% and grossly normal LVSF, unable to accurately assess RVSF  - CTPA: No main, right, left, or lobar PE. Limited evaluation of segmental and subsegmental pulmonary arteries  - Desats to 91 RA at rest and 80 on RA with ambulation. Will need home O2 set up  - Continue Lasix 40 IVP qd per pulm and cardiology recs, strict I/Os, daily weights, goal negative 1L  - RHC pending 7/16 vs Mon 7/19.  - Albuterol PRN  - Recommended outpatient PFTs and sleep study

## 2021-07-16 NOTE — PROGRESS NOTE ADULT - PROBLEM SELECTOR PLAN 2
Chronic. Outpatient management  weight loss advised TTE unremarkable, preserved EF  -Considering V/Q scan-hold for now, will need RHC first  - Hold coumadin in anticipation of RHC, on heparin gtt for now  Per patient INR on 7/10 was supra-therapeutic at 3.4 and was told by LIZETTE Kirkpatrick at PCP office to hold her coumadin that day, she had also held it on 7/5 for similar reason

## 2021-07-16 NOTE — PROGRESS NOTE ADULT - PROBLEM SELECTOR PLAN 5
FENP: No IVF, lytes PRN, low fat diet, AC with coumadin  Dispo: pending RHC  Discussed plan with patient, ACP and CM updated for home O2 FENP: No IVF, lytes PRN, low fat diet, at home on AC with coumadin, on heparin gtt for now pending RHC  Dispo: pending RHC and set up of home O2

## 2021-07-17 LAB
ANION GAP SERPL CALC-SCNC: 11 MMOL/L — SIGNIFICANT CHANGE UP (ref 7–14)
APTT BLD: 101.5 SEC — HIGH (ref 27–36.3)
APTT BLD: 62.8 SEC — HIGH (ref 27–36.3)
APTT BLD: 67.2 SEC — HIGH (ref 27–36.3)
BUN SERPL-MCNC: 11 MG/DL — SIGNIFICANT CHANGE UP (ref 7–23)
CALCIUM SERPL-MCNC: 9.8 MG/DL — SIGNIFICANT CHANGE UP (ref 8.4–10.5)
CHLORIDE SERPL-SCNC: 100 MMOL/L — SIGNIFICANT CHANGE UP (ref 98–107)
CO2 SERPL-SCNC: 33 MMOL/L — HIGH (ref 22–31)
CREAT SERPL-MCNC: 0.8 MG/DL — SIGNIFICANT CHANGE UP (ref 0.5–1.3)
GLUCOSE SERPL-MCNC: 103 MG/DL — HIGH (ref 70–99)
HCT VFR BLD CALC: 48.4 % — HIGH (ref 34.5–45)
HCT VFR BLD CALC: 50.5 % — HIGH (ref 34.5–45)
HGB BLD-MCNC: 13.9 G/DL — SIGNIFICANT CHANGE UP (ref 11.5–15.5)
HGB BLD-MCNC: 14.5 G/DL — SIGNIFICANT CHANGE UP (ref 11.5–15.5)
MAGNESIUM SERPL-MCNC: 2.5 MG/DL — SIGNIFICANT CHANGE UP (ref 1.6–2.6)
MCHC RBC-ENTMCNC: 27.6 PG — SIGNIFICANT CHANGE UP (ref 27–34)
MCHC RBC-ENTMCNC: 27.7 PG — SIGNIFICANT CHANGE UP (ref 27–34)
MCHC RBC-ENTMCNC: 28.7 GM/DL — LOW (ref 32–36)
MCHC RBC-ENTMCNC: 28.7 GM/DL — LOW (ref 32–36)
MCV RBC AUTO: 96 FL — SIGNIFICANT CHANGE UP (ref 80–100)
MCV RBC AUTO: 96.6 FL — SIGNIFICANT CHANGE UP (ref 80–100)
NRBC # BLD: 0 /100 WBCS — SIGNIFICANT CHANGE UP
NRBC # BLD: 0 /100 WBCS — SIGNIFICANT CHANGE UP
NRBC # FLD: 0 K/UL — SIGNIFICANT CHANGE UP
NRBC # FLD: 0 K/UL — SIGNIFICANT CHANGE UP
PHOSPHATE SERPL-MCNC: 3.2 MG/DL — SIGNIFICANT CHANGE UP (ref 2.5–4.5)
PLATELET # BLD AUTO: 195 K/UL — SIGNIFICANT CHANGE UP (ref 150–400)
PLATELET # BLD AUTO: 214 K/UL — SIGNIFICANT CHANGE UP (ref 150–400)
POTASSIUM SERPL-MCNC: 4.5 MMOL/L — SIGNIFICANT CHANGE UP (ref 3.5–5.3)
POTASSIUM SERPL-SCNC: 4.5 MMOL/L — SIGNIFICANT CHANGE UP (ref 3.5–5.3)
RBC # BLD: 5.01 M/UL — SIGNIFICANT CHANGE UP (ref 3.8–5.2)
RBC # BLD: 5.26 M/UL — HIGH (ref 3.8–5.2)
RBC # FLD: 14.6 % — HIGH (ref 10.3–14.5)
RBC # FLD: 14.6 % — HIGH (ref 10.3–14.5)
SODIUM SERPL-SCNC: 144 MMOL/L — SIGNIFICANT CHANGE UP (ref 135–145)
WBC # BLD: 5.17 K/UL — SIGNIFICANT CHANGE UP (ref 3.8–10.5)
WBC # BLD: 6.48 K/UL — SIGNIFICANT CHANGE UP (ref 3.8–10.5)
WBC # FLD AUTO: 5.17 K/UL — SIGNIFICANT CHANGE UP (ref 3.8–10.5)
WBC # FLD AUTO: 6.48 K/UL — SIGNIFICANT CHANGE UP (ref 3.8–10.5)

## 2021-07-17 PROCEDURE — 99233 SBSQ HOSP IP/OBS HIGH 50: CPT

## 2021-07-17 RX ADMIN — HEPARIN SODIUM 1600 UNIT(S)/HR: 5000 INJECTION INTRAVENOUS; SUBCUTANEOUS at 10:00

## 2021-07-17 RX ADMIN — HEPARIN SODIUM 1600 UNIT(S)/HR: 5000 INJECTION INTRAVENOUS; SUBCUTANEOUS at 16:43

## 2021-07-17 RX ADMIN — LOSARTAN POTASSIUM 100 MILLIGRAM(S): 100 TABLET, FILM COATED ORAL at 05:15

## 2021-07-17 RX ADMIN — MONTELUKAST 10 MILLIGRAM(S): 4 TABLET, CHEWABLE ORAL at 12:59

## 2021-07-17 RX ADMIN — HEPARIN SODIUM 1600 UNIT(S)/HR: 5000 INJECTION INTRAVENOUS; SUBCUTANEOUS at 02:08

## 2021-07-17 RX ADMIN — AMLODIPINE BESYLATE 5 MILLIGRAM(S): 2.5 TABLET ORAL at 05:15

## 2021-07-17 RX ADMIN — Medication 40 MILLIGRAM(S): at 05:15

## 2021-07-17 NOTE — PROGRESS NOTE ADULT - ASSESSMENT
56 yr old woman PMH HTN, PEx2 and LLE DVT on coumadin, p/w progressively worsening VIERA x 2 weeks with associated chest tightness. Found to have acute hypoxic respiratory failure now on NC, unclear etiology, r/o reactive airway disease vs pulmonary HTN vs CTEPH, in addition to possible OHS, OLAMIDE. Pending RHC

## 2021-07-17 NOTE — PROGRESS NOTE ADULT - PROBLEM SELECTOR PLAN 5
FENP: No IVF, lytes PRN, low fat diet, at home on AC with coumadin, on heparin gtt for now pending RHC  Dispo: pending RHC and set up of home O2

## 2021-07-17 NOTE — PROGRESS NOTE ADULT - SUBJECTIVE AND OBJECTIVE BOX
PROGRESS NOTE:   Authored by Reny Pike MD     Patient is a 56y old  Female who presents with a chief complaint of Dyspnea and chest pressure (16 Jul 2021 12:09)      SUBJECTIVE / OVERNIGHT EVENTS:    ADDITIONAL REVIEW OF SYSTEMS:    MEDICATIONS  (STANDING):  amLODIPine   Tablet 5 milliGRAM(s) Oral daily  furosemide   Injectable 40 milliGRAM(s) IV Push daily  heparin  Infusion. 1900 Unit(s)/Hr (19 mL/Hr) IV Continuous <Continuous>  losartan 100 milliGRAM(s) Oral daily  montelukast 10 milliGRAM(s) Oral daily    MEDICATIONS  (PRN):  ALBUTerol    90 MICROgram(s) HFA Inhaler 2 Puff(s) Inhalation every 6 hours PRN Shortness of Breath and/or Wheezing  heparin   Injectable 89250 Unit(s) IV Push every 6 hours PRN For aPTT less than 40  heparin   Injectable 5000 Unit(s) IV Push every 6 hours PRN For aPTT between 40 - 57      CAPILLARY BLOOD GLUCOSE        I&O's Summary    16 Jul 2021 07:01  -  17 Jul 2021 07:00  --------------------------------------------------------  IN: 400 mL / OUT: 300 mL / NET: 100 mL        PHYSICAL EXAM:  Vital Signs Last 24 Hrs  T(C): 36.7 (17 Jul 2021 05:15), Max: 37.1 (16 Jul 2021 18:00)  T(F): 98 (17 Jul 2021 05:15), Max: 98.7 (16 Jul 2021 18:00)  HR: 82 (17 Jul 2021 05:15) (80 - 90)  BP: 132/81 (17 Jul 2021 05:15) (112/62 - 132/81)  BP(mean): --  RR: 18 (17 Jul 2021 05:15) (17 - 18)  SpO2: 98% (17 Jul 2021 05:15) (95% - 98%)    GENERAL: No acute distress, well-developed  HEAD:  Atraumatic, Normocephalic  EYES: EOMI, PERRLA, conjunctiva and sclera clear  NECK: Supple, no lymphadenopathy, no JVD  CHEST/LUNG: CTAB; No wheezes, rales, or rhonchi  HEART: Regular rate and rhythm; No murmurs, rubs, or gallops  ABDOMEN: Soft, non-tender, non-distended; normal bowel sounds, no organomegaly  EXTREMITIES:  2+ peripheral pulses b/l, No clubbing, cyanosis, or edema  NEUROLOGY: A&O x 3, no focal deficits  SKIN: No rashes or lesions    LABS:                        14.5   5.17  )-----------( 214      ( 17 Jul 2021 07:05 )             50.5     07-17    144  |  100  |  11  ----------------------------<  103<H>  4.5   |  33<H>  |  0.80    Ca    9.8      17 Jul 2021 07:05  Phos  3.2     07-17  Mg     2.50     07-17      PTT - ( 17 Jul 2021 01:20 )  PTT:101.5 sec            RADIOLOGY & ADDITIONAL TESTS:  Results Reviewed:   Imaging Personally Reviewed:  Electrocardiogram Personally Reviewed:    COORDINATION OF CARE:  Care Discussed with Consultants/Other Providers [Y/N]:  Prior or Outpatient Records Reviewed [Y/N]:   PROGRESS NOTE:   Authored by Reny Pike MD     Patient is a 56y old  Female who presents with a chief complaint of Dyspnea and chest pressure (16 Jul 2021 12:09)      SUBJECTIVE / OVERNIGHT EVENTS:  Patient felt fine, denied chest pain, SOB, dizziness, abdominal pain, N/V.     ADDITIONAL REVIEW OF SYSTEMS:    MEDICATIONS  (STANDING):  amLODIPine   Tablet 5 milliGRAM(s) Oral daily  furosemide   Injectable 40 milliGRAM(s) IV Push daily  heparin  Infusion. 1900 Unit(s)/Hr (19 mL/Hr) IV Continuous <Continuous>  losartan 100 milliGRAM(s) Oral daily  montelukast 10 milliGRAM(s) Oral daily    MEDICATIONS  (PRN):  ALBUTerol    90 MICROgram(s) HFA Inhaler 2 Puff(s) Inhalation every 6 hours PRN Shortness of Breath and/or Wheezing  heparin   Injectable 57813 Unit(s) IV Push every 6 hours PRN For aPTT less than 40  heparin   Injectable 5000 Unit(s) IV Push every 6 hours PRN For aPTT between 40 - 57      CAPILLARY BLOOD GLUCOSE        I&O's Summary    16 Jul 2021 07:01  -  17 Jul 2021 07:00  --------------------------------------------------------  IN: 400 mL / OUT: 300 mL / NET: 100 mL        PHYSICAL EXAM:  Vital Signs Last 24 Hrs  T(C): 36.7 (17 Jul 2021 05:15), Max: 37.1 (16 Jul 2021 18:00)  T(F): 98 (17 Jul 2021 05:15), Max: 98.7 (16 Jul 2021 18:00)  HR: 82 (17 Jul 2021 05:15) (80 - 90)  BP: 132/81 (17 Jul 2021 05:15) (112/62 - 132/81)  BP(mean): --  RR: 18 (17 Jul 2021 05:15) (17 - 18)  SpO2: 98% (17 Jul 2021 05:15) (95% - 98%)    CONSTITUTIONAL: NAD, on NC, morbidly obese  HEENT: sclera clear, MMM, eyes tracking  RESPIRATORY: Normal respiratory effort; lungs are clear to auscultation bilaterally  CARDIOVASCULAR: S1/S2, RRR, no murmurs appreciated; + 1 pitting edema b/l LE slowly improving  ABDOMEN: soft, Nontender, nondistended, no rebound/guarding  PSYCH: calm, cooperative  NEUROLOGY: awake, alert, no gross deficits, moving all extremities  SKIN: hyperpigmentation of LLE>RLE    LABS:                        14.5   5.17  )-----------( 214      ( 17 Jul 2021 07:05 )             50.5     07-17    144  |  100  |  11  ----------------------------<  103<H>  4.5   |  33<H>  |  0.80    Ca    9.8      17 Jul 2021 07:05  Phos  3.2     07-17  Mg     2.50     07-17      PTT - ( 17 Jul 2021 01:20 )  PTT:101.5 sec            RADIOLOGY & ADDITIONAL TESTS:  Results Reviewed:   Imaging Personally Reviewed:  Electrocardiogram Personally Reviewed:    COORDINATION OF CARE:  Care Discussed with Consultants/Other Providers [Y/N]:  Prior or Outpatient Records Reviewed [Y/N]:

## 2021-07-17 NOTE — PROGRESS NOTE ADULT - PROBLEM SELECTOR PLAN 2
TTE unremarkable, preserved EF  -Considering V/Q scan-hold for now, will need RHC first  - Hold coumadin in anticipation of RHC, on heparin gtt for now  Per patient INR on 7/10 was supra-therapeutic at 3.4 and was told by LIZETTE Kirkpatrick at PCP office to hold her coumadin that day, she had also held it on 7/5 for similar reason TTE unremarkable, preserved EF  - Considering V/Q scan-hold for now, will need RHC first  - Hold coumadin in anticipation of RHC, on heparin gtt for now  Per patient INR on 7/10 was supra-therapeutic at 3.4 and was told by LIZETTE Kirkpatrick at PCP office to hold her coumadin that day, she had also held it on 7/5 for similar reason

## 2021-07-17 NOTE — PROGRESS NOTE ADULT - PROBLEM SELECTOR PLAN 1
Ddx pulm HTN, CTEPH vs reactive airway disease, OHS, OLAMIDE, h/o 2nd hand smoking exposure from ex   - COVID negative, TTE with preserved EF: 61% and grossly normal LVSF, unable to accurately assess RVSF  - CTPA: No main, right, left, or lobar PE. Limited evaluation of segmental and subsegmental pulmonary arteries  - Desats to 91 RA at rest and 80 on RA with ambulation. Will need home O2 set up  - Continue Lasix 40 IVP qd per pulm and cardiology recs, strict I/Os, daily weights, goal negative 1L  - RHC pending 7/16 vs Mon 7/19.  - Albuterol PRN  - Recommended outpatient PFTs and sleep study Ddx pulm HTN, CTEPH vs reactive airway disease, OHS, OLAMIDE, h/o 2nd hand smoking exposure from ex   - COVID negative, TTE with preserved EF: 61% and grossly normal LVSF, unable to accurately assess RVSF  - CTPA: No main, right, left, or lobar PE. Limited evaluation of segmental and subsegmental pulmonary arteries  - Desats to 91 RA at rest and 80 on RA with ambulation. Will need home O2 set up  - Continue Lasix 40 IVP qd per pulm and cardiology recs, strict I/Os, daily weights, goal negative 1L  - RHC pending Mon 7/19.  - Albuterol PRN  - Recommended outpatient PFTs and sleep study

## 2021-07-18 DIAGNOSIS — R60.9 EDEMA, UNSPECIFIED: ICD-10-CM

## 2021-07-18 LAB
ANION GAP SERPL CALC-SCNC: 11 MMOL/L — SIGNIFICANT CHANGE UP (ref 7–14)
APTT BLD: 62.1 SEC — HIGH (ref 27–36.3)
AUTO DIFF PNL BLD: ABNORMAL
BASOPHILS # BLD AUTO: 0.03 K/UL — SIGNIFICANT CHANGE UP (ref 0–0.2)
BASOPHILS NFR BLD AUTO: 0.6 % — SIGNIFICANT CHANGE UP (ref 0–2)
BUN SERPL-MCNC: 12 MG/DL — SIGNIFICANT CHANGE UP (ref 7–23)
C-ANCA SER-ACNC: NEGATIVE — SIGNIFICANT CHANGE UP
CALCIUM SERPL-MCNC: 9.5 MG/DL — SIGNIFICANT CHANGE UP (ref 8.4–10.5)
CHLORIDE SERPL-SCNC: 100 MMOL/L — SIGNIFICANT CHANGE UP (ref 98–107)
CO2 SERPL-SCNC: 33 MMOL/L — HIGH (ref 22–31)
CREAT SERPL-MCNC: 0.78 MG/DL — SIGNIFICANT CHANGE UP (ref 0.5–1.3)
EOSINOPHIL # BLD AUTO: 0.22 K/UL — SIGNIFICANT CHANGE UP (ref 0–0.5)
EOSINOPHIL NFR BLD AUTO: 4 % — SIGNIFICANT CHANGE UP (ref 0–6)
GLUCOSE SERPL-MCNC: 101 MG/DL — HIGH (ref 70–99)
HCT VFR BLD CALC: 47.3 % — HIGH (ref 34.5–45)
HGB BLD-MCNC: 13.7 G/DL — SIGNIFICANT CHANGE UP (ref 11.5–15.5)
IANC: 3.25 K/UL — SIGNIFICANT CHANGE UP (ref 1.5–8.5)
IMM GRANULOCYTES NFR BLD AUTO: 0.2 % — SIGNIFICANT CHANGE UP (ref 0–1.5)
INR BLD: 1.14 RATIO — SIGNIFICANT CHANGE UP (ref 0.88–1.16)
LYMPHOCYTES # BLD AUTO: 1.45 K/UL — SIGNIFICANT CHANGE UP (ref 1–3.3)
LYMPHOCYTES # BLD AUTO: 26.6 % — SIGNIFICANT CHANGE UP (ref 13–44)
MAGNESIUM SERPL-MCNC: 2.3 MG/DL — SIGNIFICANT CHANGE UP (ref 1.6–2.6)
MCHC RBC-ENTMCNC: 27.5 PG — SIGNIFICANT CHANGE UP (ref 27–34)
MCHC RBC-ENTMCNC: 29 GM/DL — LOW (ref 32–36)
MCV RBC AUTO: 94.8 FL — SIGNIFICANT CHANGE UP (ref 80–100)
MONOCYTES # BLD AUTO: 0.49 K/UL — SIGNIFICANT CHANGE UP (ref 0–0.9)
MONOCYTES NFR BLD AUTO: 9 % — SIGNIFICANT CHANGE UP (ref 2–14)
NEUTROPHILS # BLD AUTO: 3.25 K/UL — SIGNIFICANT CHANGE UP (ref 1.8–7.4)
NEUTROPHILS NFR BLD AUTO: 59.6 % — SIGNIFICANT CHANGE UP (ref 43–77)
NRBC # BLD: 0 /100 WBCS — SIGNIFICANT CHANGE UP
NRBC # FLD: 0 K/UL — SIGNIFICANT CHANGE UP
P-ANCA SER-ACNC: NEGATIVE — SIGNIFICANT CHANGE UP
PHOSPHATE SERPL-MCNC: 3 MG/DL — SIGNIFICANT CHANGE UP (ref 2.5–4.5)
PLATELET # BLD AUTO: 198 K/UL — SIGNIFICANT CHANGE UP (ref 150–400)
POTASSIUM SERPL-MCNC: 4.4 MMOL/L — SIGNIFICANT CHANGE UP (ref 3.5–5.3)
POTASSIUM SERPL-SCNC: 4.4 MMOL/L — SIGNIFICANT CHANGE UP (ref 3.5–5.3)
PROTHROM AB SERPL-ACNC: 13 SEC — SIGNIFICANT CHANGE UP (ref 10.6–13.6)
RBC # BLD: 4.99 M/UL — SIGNIFICANT CHANGE UP (ref 3.8–5.2)
RBC # FLD: 14.3 % — SIGNIFICANT CHANGE UP (ref 10.3–14.5)
SODIUM SERPL-SCNC: 144 MMOL/L — SIGNIFICANT CHANGE UP (ref 135–145)
WBC # BLD: 5.45 K/UL — SIGNIFICANT CHANGE UP (ref 3.8–10.5)
WBC # FLD AUTO: 5.45 K/UL — SIGNIFICANT CHANGE UP (ref 3.8–10.5)

## 2021-07-18 PROCEDURE — 99233 SBSQ HOSP IP/OBS HIGH 50: CPT

## 2021-07-18 RX ADMIN — Medication 40 MILLIGRAM(S): at 05:35

## 2021-07-18 RX ADMIN — MONTELUKAST 10 MILLIGRAM(S): 4 TABLET, CHEWABLE ORAL at 11:03

## 2021-07-18 RX ADMIN — HEPARIN SODIUM 1600 UNIT(S)/HR: 5000 INJECTION INTRAVENOUS; SUBCUTANEOUS at 07:50

## 2021-07-18 RX ADMIN — AMLODIPINE BESYLATE 5 MILLIGRAM(S): 2.5 TABLET ORAL at 05:35

## 2021-07-18 RX ADMIN — LOSARTAN POTASSIUM 100 MILLIGRAM(S): 100 TABLET, FILM COATED ORAL at 05:35

## 2021-07-18 NOTE — PROGRESS NOTE ADULT - SUBJECTIVE AND OBJECTIVE BOX
Patient is a 56y old  Female who presents with a chief complaint of Dyspnea and chest pressure (17 Jul 2021 07:52)    [facility] - Department of Medicine   Tam Mckinley DO   Pager: [phone]  Cell: [phone]    SUBJECTIVE / OVERNIGHT EVENTS: Pt is laying in bed comfortably and crocheting. Denies any current complaints at rest.     MEDICATIONS  (STANDING):  amLODIPine   Tablet 5 milliGRAM(s) Oral daily  furosemide   Injectable 40 milliGRAM(s) IV Push daily  heparin  Infusion. 1900 Unit(s)/Hr (19 mL/Hr) IV Continuous <Continuous>  losartan 100 milliGRAM(s) Oral daily  montelukast 10 milliGRAM(s) Oral daily    MEDICATIONS  (PRN):  ALBUTerol    90 MICROgram(s) HFA Inhaler 2 Puff(s) Inhalation every 6 hours PRN Shortness of Breath and/or Wheezing  heparin   Injectable 84959 Unit(s) IV Push every 6 hours PRN For aPTT less than 40  heparin   Injectable 5000 Unit(s) IV Push every 6 hours PRN For aPTT between 40 - 57      Vital Signs Last 24 Hrs  T(C): 37 (18 Jul 2021 11:02), Max: 37 (18 Jul 2021 11:02)  T(F): 98.6 (18 Jul 2021 11:02), Max: 98.6 (18 Jul 2021 11:02)  HR: 88 (18 Jul 2021 11:02) (77 - 88)  BP: 119/70 (18 Jul 2021 11:02) (115/65 - 119/70)  BP(mean): --  RR: 18 (18 Jul 2021 11:02) (17 - 18)  SpO2: 95% (18 Jul 2021 11:02) (95% - 100%)  CAPILLARY BLOOD GLUCOSE      CONSTITUTIONAL: NAD, on NC, morbidly obese  HEENT: sclera clear, MMM, eyes tracking  RESPIRATORY: Normal respiratory effort; lungs are clear to auscultation bilaterally  CARDIOVASCULAR: S1/S2, RRR, no murmurs appreciated; + 1 pitting edema b/l LE slowly improving  ABDOMEN: soft, Nontender, nondistended, no rebound/guarding  PSYCH: calm, cooperative  NEUROLOGY: awake, alert, no gross deficits, moving all extremities  SKIN: hyperpigmentation of shins b/l    LABS:                        13.7   5.45  )-----------( 198      ( 18 Jul 2021 06:26 )             47.3     07-18    144  |  100  |  12  ----------------------------<  101<H>  4.4   |  33<H>  |  0.78    Ca    9.5      18 Jul 2021 06:26  Phos  3.0     07-18  Mg     2.30     07-18      PT/INR - ( 18 Jul 2021 06:26 )   PT: 13.0 sec;   INR: 1.14 ratio         PTT - ( 18 Jul 2021 06:26 )  PTT:62.1 sec          RADIOLOGY & ADDITIONAL TESTS:

## 2021-07-18 NOTE — PROGRESS NOTE ADULT - PROBLEM SELECTOR PLAN 3
TTE unremarkable, preserved EF  - Considering V/Q scan-hold for now, will need RHC first  - Hold coumadin in anticipation of RHC, on heparin gtt for now; PTT therapeutic

## 2021-07-18 NOTE — PROGRESS NOTE ADULT - PROBLEM SELECTOR PLAN 1
Ddx pulm HTN, CTEPH vs reactive airway disease, OHS, OLAMIDE, h/o 2nd hand smoking exposure from ex   - COVID negative, TTE with preserved EF: 61% and grossly normal LVSF, unable to accurately assess RVSF  - CTPA: No main, right, left, or lobar PE. Limited evaluation of segmental and subsegmental pulmonary arteries  - Desats to 91 RA at rest and 80 on RA with ambulation. Will need home O2 set up  - Continue Lasix 40 IVP qd per pulm and cardiology recs, strict I/Os, daily weights, goal negative 1L  - RHC pending Mon 7/19 - NPO at [place]  - Albuterol PRN  - Recommended outpatient PFTs and sleep study

## 2021-07-18 NOTE — PROGRESS NOTE ADULT - PROBLEM SELECTOR PLAN 2
Found to have LE edema, on chronic AC at home.   - Lasix 40mg IVP daily   - standing wt today 330lbs (decreased from prior)

## 2021-07-19 LAB
ANION GAP SERPL CALC-SCNC: 14 MMOL/L — SIGNIFICANT CHANGE UP (ref 7–14)
APTT BLD: 31.5 SEC — SIGNIFICANT CHANGE UP (ref 27–36.3)
APTT BLD: 56.7 SEC — HIGH (ref 27–36.3)
BUN SERPL-MCNC: 14 MG/DL — SIGNIFICANT CHANGE UP (ref 7–23)
CALCIUM SERPL-MCNC: 9.7 MG/DL — SIGNIFICANT CHANGE UP (ref 8.4–10.5)
CHLORIDE SERPL-SCNC: 99 MMOL/L — SIGNIFICANT CHANGE UP (ref 98–107)
CO2 SERPL-SCNC: 26 MMOL/L — SIGNIFICANT CHANGE UP (ref 22–31)
CREAT SERPL-MCNC: 0.81 MG/DL — SIGNIFICANT CHANGE UP (ref 0.5–1.3)
GLUCOSE SERPL-MCNC: 101 MG/DL — HIGH (ref 70–99)
HCT VFR BLD CALC: 47.7 % — HIGH (ref 34.5–45)
HGB BLD-MCNC: 13.8 G/DL — SIGNIFICANT CHANGE UP (ref 11.5–15.5)
INR BLD: 1.12 RATIO — SIGNIFICANT CHANGE UP (ref 0.88–1.16)
MAGNESIUM SERPL-MCNC: 2.6 MG/DL — SIGNIFICANT CHANGE UP (ref 1.6–2.6)
MCHC RBC-ENTMCNC: 27.7 PG — SIGNIFICANT CHANGE UP (ref 27–34)
MCHC RBC-ENTMCNC: 28.9 GM/DL — LOW (ref 32–36)
MCV RBC AUTO: 95.8 FL — SIGNIFICANT CHANGE UP (ref 80–100)
NRBC # BLD: 0 /100 WBCS — SIGNIFICANT CHANGE UP
NRBC # FLD: 0 K/UL — SIGNIFICANT CHANGE UP
PHOSPHATE SERPL-MCNC: 3.6 MG/DL — SIGNIFICANT CHANGE UP (ref 2.5–4.5)
PLATELET # BLD AUTO: 180 K/UL — SIGNIFICANT CHANGE UP (ref 150–400)
POTASSIUM SERPL-MCNC: 4.8 MMOL/L — SIGNIFICANT CHANGE UP (ref 3.5–5.3)
POTASSIUM SERPL-SCNC: 4.8 MMOL/L — SIGNIFICANT CHANGE UP (ref 3.5–5.3)
PROTHROM AB SERPL-ACNC: 12.7 SEC — SIGNIFICANT CHANGE UP (ref 10.6–13.6)
RBC # BLD: 4.98 M/UL — SIGNIFICANT CHANGE UP (ref 3.8–5.2)
RBC # FLD: 14.2 % — SIGNIFICANT CHANGE UP (ref 10.3–14.5)
SODIUM SERPL-SCNC: 139 MMOL/L — SIGNIFICANT CHANGE UP (ref 135–145)
WBC # BLD: 5.51 K/UL — SIGNIFICANT CHANGE UP (ref 3.8–10.5)
WBC # FLD AUTO: 5.51 K/UL — SIGNIFICANT CHANGE UP (ref 3.8–10.5)

## 2021-07-19 PROCEDURE — 76937 US GUIDE VASCULAR ACCESS: CPT | Mod: 26

## 2021-07-19 PROCEDURE — 99233 SBSQ HOSP IP/OBS HIGH 50: CPT | Mod: GC

## 2021-07-19 PROCEDURE — 93451 RIGHT HEART CATH: CPT | Mod: 26

## 2021-07-19 RX ORDER — FUROSEMIDE 40 MG
40 TABLET ORAL ONCE
Refills: 0 | Status: COMPLETED | OUTPATIENT
Start: 2021-07-19 | End: 2021-07-19

## 2021-07-19 RX ORDER — HEPARIN SODIUM 5000 [USP'U]/ML
10000 INJECTION INTRAVENOUS; SUBCUTANEOUS EVERY 6 HOURS
Refills: 0 | Status: DISCONTINUED | OUTPATIENT
Start: 2021-07-19 | End: 2021-07-20

## 2021-07-19 RX ORDER — FUROSEMIDE 40 MG
40 TABLET ORAL
Refills: 0 | Status: DISCONTINUED | OUTPATIENT
Start: 2021-07-20 | End: 2021-07-21

## 2021-07-19 RX ORDER — FUROSEMIDE 40 MG
40 TABLET ORAL
Refills: 0 | Status: DISCONTINUED | OUTPATIENT
Start: 2021-07-19 | End: 2021-07-19

## 2021-07-19 RX ORDER — WARFARIN SODIUM 2.5 MG/1
10 TABLET ORAL ONCE
Refills: 0 | Status: COMPLETED | OUTPATIENT
Start: 2021-07-19 | End: 2021-07-19

## 2021-07-19 RX ORDER — HEPARIN SODIUM 5000 [USP'U]/ML
5000 INJECTION INTRAVENOUS; SUBCUTANEOUS EVERY 6 HOURS
Refills: 0 | Status: DISCONTINUED | OUTPATIENT
Start: 2021-07-19 | End: 2021-07-20

## 2021-07-19 RX ORDER — HEPARIN SODIUM 5000 [USP'U]/ML
1900 INJECTION INTRAVENOUS; SUBCUTANEOUS
Qty: 25000 | Refills: 0 | Status: DISCONTINUED | OUTPATIENT
Start: 2021-07-19 | End: 2021-07-20

## 2021-07-19 RX ADMIN — AMLODIPINE BESYLATE 5 MILLIGRAM(S): 2.5 TABLET ORAL at 05:00

## 2021-07-19 RX ADMIN — HEPARIN SODIUM 5000 UNIT(S): 5000 INJECTION INTRAVENOUS; SUBCUTANEOUS at 07:44

## 2021-07-19 RX ADMIN — HEPARIN SODIUM 1900 UNIT(S)/HR: 5000 INJECTION INTRAVENOUS; SUBCUTANEOUS at 07:46

## 2021-07-19 RX ADMIN — Medication 40 MILLIGRAM(S): at 17:01

## 2021-07-19 RX ADMIN — HEPARIN SODIUM 1900 UNIT(S)/HR: 5000 INJECTION INTRAVENOUS; SUBCUTANEOUS at 18:46

## 2021-07-19 RX ADMIN — MONTELUKAST 10 MILLIGRAM(S): 4 TABLET, CHEWABLE ORAL at 17:01

## 2021-07-19 RX ADMIN — LOSARTAN POTASSIUM 100 MILLIGRAM(S): 100 TABLET, FILM COATED ORAL at 05:00

## 2021-07-19 RX ADMIN — WARFARIN SODIUM 10 MILLIGRAM(S): 2.5 TABLET ORAL at 18:33

## 2021-07-19 NOTE — CHART NOTE - NSCHARTNOTEFT_GEN_A_CORE
ZAHRAA MEJIA  MRN-[id number]    Patient status post cath via right brachial access. Site clean, dry, intact. Pulses present, capillary refill appropriate. Without hematoma. Will continue to  follow closely.

## 2021-07-19 NOTE — CHART NOTE - NSCHARTNOTEFT_GEN_A_CORE
s/p RHC via Right brachial vein access with history of PE/DVT's, may resume heparin drip (no bolus) at 1730 if brachal site check remains stable and may resume coumadin tonight if indicated by primary team  primary ACP team aware

## 2021-07-19 NOTE — PROGRESS NOTE ADULT - ASSESSMENT
56 year-old morbidly obese F with PMH of PE on Coumadin who presented to the hospital with worsening shortness of breath and desaturation on ambulation. Pulmonary consulted for evaluation of hypoxia in the setting of previous thromboembolic disease.    Dyspnea on Exertion  - Potentially in the setting of pulmonary hypertension, possibly due to OLAMIDE/OHS (nocturnal hypoxia) vs CTEPH given history of thromboembolic disease  - CTA without evidence of acute PE   - Given inability to ascertain pulmonary pressures on TTE, patient would benefit from a RHC to r/o pulmonary hypertension. Discussed with cardiology  - Patient will need to follow up outpatient with pulmonary office for PFTs and sleep study  - Patient will require oxygen on discharge  - Agree with continuing diuresis as patient has significant pedal edema, optimize volume status as much as possible   - Follow up autoimmune workup    Robin Damon, PGY-6  Pulmonary and Critical Care Medicine  [id number] 56 year-old morbidly obese F with PMH of PE on Coumadin who presented to the hospital with worsening shortness of breath and desaturation on ambulation. Pulmonary consulted for evaluation of hypoxia in the setting of previous thromboembolic disease.    Dyspnea on Exertion  - Patient s/p RHC, with mild-moderate pulmonary hypertension  - Would continue diuresis for now, please make Lasix 40mg PO BID (please give PO dose of Lasix 40 tonight)   - Patient will need to follow up outpatient with pulmonary office for PFTs and sleep study  - Patient will require oxygen on discharge  - Follow up autoimmune workup, so far negative  - Please email: [email or web address] to setup an appointment prior to discharge. Include the patient's name, , MRN and contact information in the email.      Pulmonary/Sleep Clinic  [address] [place] [address]  [phone]    Robin Damon, PGY-6  Pulmonary and Critical Care Medicine  [id number]

## 2021-07-19 NOTE — PROGRESS NOTE ADULT - SUBJECTIVE AND OBJECTIVE BOX
CHIEF COMPLAINT: Dyspnea, chest pressure    Interval Events: No acute events. Resting in bed.    REVIEW OF SYSTEMS:  Constitutional: No fevers or chills.  Eyes: No itching or discharge from the eyes  ENT: No ear pain. No ear discharge. No nasal congestion.   CV: No chest pain. No palpitations. No lightheadedness or dizziness.   Resp: No dyspnea at rest. +Dyspnea on exertion.  GI: No nausea. No vomiting. No diarrhea.  MSK: No joint pain or pain in any extremities  Integumentary: No skin lesions.   Neurological: No gross motor weakness. No sensory changes.  [x] All other systems negative  [ ] Unable to assess ROS because ________    OBJECTIVE:  ICU Vital Signs Last 24 Hrs  T(C): 36.6 (19 Jul 2021 05:01), Max: 37 (18 Jul 2021 11:02)  T(F): 97.9 (19 Jul 2021 05:01), Max: 98.6 (18 Jul 2021 11:02)  HR: 89 (19 Jul 2021 05:01) (83 - 89)  BP: 114/78 (19 Jul 2021 05:01) (110/65 - 140/77)  BP(mean): --  ABP: --  ABP(mean): --  RR: 18 (19 Jul 2021 05:01) (18 - 18)  SpO2: 98% (19 Jul 2021 05:01) (95% - 100%)        07-18 @ 07:01  -  07-19 @ 07:00  --------------------------------------------------------  IN: 800 mL / OUT: 1500 mL / NET: -700 mL      CAPILLARY BLOOD GLUCOSE          PHYSICAL EXAM:  General: Awake, alert, oriented X 3.   HEENT: Atraumatic, normocephalic.   Lymph Nodes: No palpable lymphadenopathy  Neck: Supple  Respiratory: Normal chest expansion. CTAB  Cardiovascular: S1 S2 normal. No murmurs, rubs or gallops.   Abdomen: Soft, non-tender, non-distended. No organomegaly.  Extremities: Warm to touch. Peripheral pulse palpable.  Skin: No rashes or skin lesions  Neurological: Motor and sensory examination equal and normal in all four extremities.  Psychiatry: Appropriate mood and affect.    HOSPITAL MEDICATIONS:  MEDICATIONS  (STANDING):  amLODIPine   Tablet 5 milliGRAM(s) Oral daily  furosemide   Injectable 40 milliGRAM(s) IV Push daily  heparin  Infusion. 1900 Unit(s)/Hr (19 mL/Hr) IV Continuous <Continuous>  losartan 100 milliGRAM(s) Oral daily  montelukast 10 milliGRAM(s) Oral daily    MEDICATIONS  (PRN):  ALBUTerol    90 MICROgram(s) HFA Inhaler 2 Puff(s) Inhalation every 6 hours PRN Shortness of Breath and/or Wheezing  heparin   Injectable 15698 Unit(s) IV Push every 6 hours PRN For aPTT less than 40  heparin   Injectable 5000 Unit(s) IV Push every 6 hours PRN For aPTT between 40 - 57      LABS:                        13.8   5.51  )-----------( 180      ( 19 Jul 2021 07:41 )             47.7     07-19    139  |  99  |  14  ----------------------------<  101<H>  4.8   |  26  |  0.81    Ca    9.7      19 Jul 2021 05:04  Phos  3.6     07-19  Mg     2.60     07-19      PT/INR - ( 18 Jul 2021 06:26 )   PT: 13.0 sec;   INR: 1.14 ratio         PTT - ( 19 Jul 2021 07:08 )  PTT:56.7 sec          MICROBIOLOGY:     RADIOLOGY:  [ ] Reviewed and interpreted by me    Point of Care Ultrasound Findings:    PFT:    EKG:

## 2021-07-19 NOTE — PROGRESS NOTE ADULT - SUBJECTIVE AND OBJECTIVE BOX
PROGRESS NOTE:   Authored by Reny Pike MD     Patient is a 56y old  Female who presents with a chief complaint of Dyspnea and chest pressure (19 Jul 2021 10:11)      SUBJECTIVE / OVERNIGHT EVENTS:    ADDITIONAL REVIEW OF SYSTEMS:    MEDICATIONS  (STANDING):  amLODIPine   Tablet 5 milliGRAM(s) Oral daily  furosemide   Injectable 40 milliGRAM(s) IV Push daily  heparin  Infusion. 1900 Unit(s)/Hr (19 mL/Hr) IV Continuous <Continuous>  losartan 100 milliGRAM(s) Oral daily  montelukast 10 milliGRAM(s) Oral daily    MEDICATIONS  (PRN):  ALBUTerol    90 MICROgram(s) HFA Inhaler 2 Puff(s) Inhalation every 6 hours PRN Shortness of Breath and/or Wheezing  heparin   Injectable 36967 Unit(s) IV Push every 6 hours PRN For aPTT less than 40  heparin   Injectable 5000 Unit(s) IV Push every 6 hours PRN For aPTT between 40 - 57      CAPILLARY BLOOD GLUCOSE        I&O's Summary    18 Jul 2021 07:01  -  19 Jul 2021 07:00  --------------------------------------------------------  IN: 800 mL / OUT: 1500 mL / NET: -700 mL        PHYSICAL EXAM:  Vital Signs Last 24 Hrs  T(C): 36.6 (19 Jul 2021 05:01), Max: 37 (18 Jul 2021 11:02)  T(F): 97.9 (19 Jul 2021 05:01), Max: 98.6 (18 Jul 2021 11:02)  HR: 89 (19 Jul 2021 05:01) (83 - 89)  BP: 114/78 (19 Jul 2021 05:01) (110/65 - 140/77)  BP(mean): --  RR: 18 (19 Jul 2021 05:01) (18 - 18)  SpO2: 98% (19 Jul 2021 05:01) (95% - 100%)    GENERAL: No acute distress, well-developed  HEAD:  Atraumatic, Normocephalic  EYES: EOMI, PERRLA, conjunctiva and sclera clear  NECK: Supple, no lymphadenopathy, no JVD  CHEST/LUNG: CTAB; No wheezes, rales, or rhonchi  HEART: Regular rate and rhythm; No murmurs, rubs, or gallops  ABDOMEN: Soft, non-tender, non-distended; normal bowel sounds, no organomegaly  EXTREMITIES:  2+ peripheral pulses b/l, No clubbing, cyanosis, or edema  NEUROLOGY: A&O x 3, no focal deficits  SKIN: No rashes or lesions    LABS:                        13.8   5.51  )-----------( 180      ( 19 Jul 2021 07:41 )             47.7     07-19    139  |  99  |  14  ----------------------------<  101<H>  4.8   |  26  |  0.81    Ca    9.7      19 Jul 2021 05:04  Phos  3.6     07-19  Mg     2.60     07-19      PT/INR - ( 18 Jul 2021 06:26 )   PT: 13.0 sec;   INR: 1.14 ratio         PTT - ( 19 Jul 2021 07:08 )  PTT:56.7 sec            RADIOLOGY & ADDITIONAL TESTS:  Results Reviewed:   Imaging Personally Reviewed:  Electrocardiogram Personally Reviewed:    COORDINATION OF CARE:  Care Discussed with Consultants/Other Providers [Y/N]:  Prior or Outpatient Records Reviewed [Y/N]:   PROGRESS NOTE:   Authored by Reny Pike MD     Patient is a 56y old  Female who presents with a chief complaint of Dyspnea and chest pressure (19 Jul 2021 10:11)      SUBJECTIVE / OVERNIGHT EVENTS:  Patient denied chest pain, SOB, nausea, vomiting, diarrhea.     ADDITIONAL REVIEW OF SYSTEMS:    MEDICATIONS  (STANDING):  amLODIPine   Tablet 5 milliGRAM(s) Oral daily  furosemide   Injectable 40 milliGRAM(s) IV Push daily  heparin  Infusion. 1900 Unit(s)/Hr (19 mL/Hr) IV Continuous <Continuous>  losartan 100 milliGRAM(s) Oral daily  montelukast 10 milliGRAM(s) Oral daily    MEDICATIONS  (PRN):  ALBUTerol    90 MICROgram(s) HFA Inhaler 2 Puff(s) Inhalation every 6 hours PRN Shortness of Breath and/or Wheezing  heparin   Injectable 19695 Unit(s) IV Push every 6 hours PRN For aPTT less than 40  heparin   Injectable 5000 Unit(s) IV Push every 6 hours PRN For aPTT between 40 - 57      CAPILLARY BLOOD GLUCOSE        I&O's Summary    18 Jul 2021 07:01  -  19 Jul 2021 07:00  --------------------------------------------------------  IN: 800 mL / OUT: 1500 mL / NET: -700 mL        PHYSICAL EXAM:  Vital Signs Last 24 Hrs  T(C): 36.6 (19 Jul 2021 05:01), Max: 37 (18 Jul 2021 11:02)  T(F): 97.9 (19 Jul 2021 05:01), Max: 98.6 (18 Jul 2021 11:02)  HR: 89 (19 Jul 2021 05:01) (83 - 89)  BP: 114/78 (19 Jul 2021 05:01) (110/65 - 140/77)  BP(mean): --  RR: 18 (19 Jul 2021 05:01) (18 - 18)  SpO2: 98% (19 Jul 2021 05:01) (95% - 100%)    CONSTITUTIONAL: NAD, on NC, morbidly obese  HEENT: sclera clear, MMM, eyes tracking  RESPIRATORY: Normal respiratory effort; lungs are clear to auscultation bilaterally  CARDIOVASCULAR: S1/S2, RRR, no murmurs appreciated; + 1 pitting edema b/l LE slowly improving  ABDOMEN: soft, Nontender, nondistended, no rebound/guarding  PSYCH: calm, cooperative  NEUROLOGY: awake, alert, no gross deficits, moving all extremities  SKIN: hyperpigmentation of LLE>RLE    LABS:                        13.8   5.51  )-----------( 180      ( 19 Jul 2021 07:41 )             47.7     07-19    139  |  99  |  14  ----------------------------<  101<H>  4.8   |  26  |  0.81    Ca    9.7      19 Jul 2021 05:04  Phos  3.6     07-19  Mg     2.60     07-19      PT/INR - ( 18 Jul 2021 06:26 )   PT: 13.0 sec;   INR: 1.14 ratio         PTT - ( 19 Jul 2021 07:08 )  PTT:56.7 sec            RADIOLOGY & ADDITIONAL TESTS:  Results Reviewed:   Imaging Personally Reviewed:  Electrocardiogram Personally Reviewed:    COORDINATION OF CARE:  Care Discussed with Consultants/Other Providers [Y/N]:  Prior or Outpatient Records Reviewed [Y/N]:

## 2021-07-20 PROBLEM — I82.409 ACUTE EMBOLISM AND THROMBOSIS OF UNSPECIFIED DEEP VEINS OF UNSPECIFIED LOWER EXTREMITY: Chronic | Status: ACTIVE | Noted: 2021-07-13

## 2021-07-20 PROBLEM — E66.01 MORBID (SEVERE) OBESITY DUE TO EXCESS CALORIES: Chronic | Status: ACTIVE | Noted: 2021-07-13

## 2021-07-20 PROBLEM — I10 ESSENTIAL (PRIMARY) HYPERTENSION: Chronic | Status: ACTIVE | Noted: 2021-07-13

## 2021-07-20 PROBLEM — I26.99 OTHER PULMONARY EMBOLISM WITHOUT ACUTE COR PULMONALE: Chronic | Status: ACTIVE | Noted: 2021-07-13

## 2021-07-20 LAB
ANA TITR SER: NEGATIVE — SIGNIFICANT CHANGE UP
ANION GAP SERPL CALC-SCNC: 13 MMOL/L — SIGNIFICANT CHANGE UP (ref 7–14)
APTT BLD: 102.2 SEC — HIGH (ref 27–36.3)
APTT BLD: 60.2 SEC — HIGH (ref 27–36.3)
BUN SERPL-MCNC: 11 MG/DL — SIGNIFICANT CHANGE UP (ref 7–23)
CALCIUM SERPL-MCNC: 9.6 MG/DL — SIGNIFICANT CHANGE UP (ref 8.4–10.5)
CHLORIDE SERPL-SCNC: 97 MMOL/L — LOW (ref 98–107)
CO2 SERPL-SCNC: 31 MMOL/L — SIGNIFICANT CHANGE UP (ref 22–31)
CREAT SERPL-MCNC: 0.72 MG/DL — SIGNIFICANT CHANGE UP (ref 0.5–1.3)
GLUCOSE SERPL-MCNC: 100 MG/DL — HIGH (ref 70–99)
HCT VFR BLD CALC: 48.2 % — HIGH (ref 34.5–45)
HCT VFR BLD CALC: 48.4 % — HIGH (ref 34.5–45)
HGB BLD-MCNC: 13.9 G/DL — SIGNIFICANT CHANGE UP (ref 11.5–15.5)
HGB BLD-MCNC: 14 G/DL — SIGNIFICANT CHANGE UP (ref 11.5–15.5)
INR BLD: 1.11 RATIO — SIGNIFICANT CHANGE UP (ref 0.88–1.16)
MAGNESIUM SERPL-MCNC: 2.4 MG/DL — SIGNIFICANT CHANGE UP (ref 1.6–2.6)
MCHC RBC-ENTMCNC: 27.5 PG — SIGNIFICANT CHANGE UP (ref 27–34)
MCHC RBC-ENTMCNC: 27.7 PG — SIGNIFICANT CHANGE UP (ref 27–34)
MCHC RBC-ENTMCNC: 28.7 GM/DL — LOW (ref 32–36)
MCHC RBC-ENTMCNC: 29 GM/DL — LOW (ref 32–36)
MCV RBC AUTO: 95.4 FL — SIGNIFICANT CHANGE UP (ref 80–100)
MCV RBC AUTO: 95.7 FL — SIGNIFICANT CHANGE UP (ref 80–100)
NRBC # BLD: 0 /100 WBCS — SIGNIFICANT CHANGE UP
NRBC # BLD: 0 /100 WBCS — SIGNIFICANT CHANGE UP
NRBC # FLD: 0 K/UL — SIGNIFICANT CHANGE UP
NRBC # FLD: 0 K/UL — SIGNIFICANT CHANGE UP
PHOSPHATE SERPL-MCNC: 3.5 MG/DL — SIGNIFICANT CHANGE UP (ref 2.5–4.5)
PLATELET # BLD AUTO: 170 K/UL — SIGNIFICANT CHANGE UP (ref 150–400)
PLATELET # BLD AUTO: 190 K/UL — SIGNIFICANT CHANGE UP (ref 150–400)
POTASSIUM SERPL-MCNC: 4 MMOL/L — SIGNIFICANT CHANGE UP (ref 3.5–5.3)
POTASSIUM SERPL-SCNC: 4 MMOL/L — SIGNIFICANT CHANGE UP (ref 3.5–5.3)
PROTHROM AB SERPL-ACNC: 12.7 SEC — SIGNIFICANT CHANGE UP (ref 10.6–13.6)
RBC # BLD: 5.05 M/UL — SIGNIFICANT CHANGE UP (ref 3.8–5.2)
RBC # BLD: 5.06 M/UL — SIGNIFICANT CHANGE UP (ref 3.8–5.2)
RBC # FLD: 14.3 % — SIGNIFICANT CHANGE UP (ref 10.3–14.5)
RBC # FLD: 14.4 % — SIGNIFICANT CHANGE UP (ref 10.3–14.5)
SODIUM SERPL-SCNC: 141 MMOL/L — SIGNIFICANT CHANGE UP (ref 135–145)
WBC # BLD: 5.47 K/UL — SIGNIFICANT CHANGE UP (ref 3.8–10.5)
WBC # BLD: 6.25 K/UL — SIGNIFICANT CHANGE UP (ref 3.8–10.5)
WBC # FLD AUTO: 5.47 K/UL — SIGNIFICANT CHANGE UP (ref 3.8–10.5)
WBC # FLD AUTO: 6.25 K/UL — SIGNIFICANT CHANGE UP (ref 3.8–10.5)

## 2021-07-20 PROCEDURE — 99233 SBSQ HOSP IP/OBS HIGH 50: CPT | Mod: GC

## 2021-07-20 PROCEDURE — 99232 SBSQ HOSP IP/OBS MODERATE 35: CPT | Mod: GC

## 2021-07-20 RX ORDER — APIXABAN 2.5 MG/1
10 TABLET, FILM COATED ORAL EVERY 12 HOURS
Refills: 0 | Status: DISCONTINUED | OUTPATIENT
Start: 2021-07-20 | End: 2021-07-21

## 2021-07-20 RX ORDER — RIVAROXABAN 15 MG-20MG
1 KIT ORAL
Qty: 30 | Refills: 0
Start: 2021-07-20 | End: 2021-08-18

## 2021-07-20 RX ORDER — WARFARIN SODIUM 2.5 MG/1
10 TABLET ORAL ONCE
Refills: 0 | Status: DISCONTINUED | OUTPATIENT
Start: 2021-07-20 | End: 2021-07-20

## 2021-07-20 RX ORDER — RIVAROXABAN 15 MG-20MG
15 KIT ORAL
Refills: 0 | Status: DISCONTINUED | OUTPATIENT
Start: 2021-07-20 | End: 2021-07-20

## 2021-07-20 RX ORDER — RIVAROXABAN 15 MG-20MG
20 KIT ORAL
Refills: 0 | Status: DISCONTINUED | OUTPATIENT
Start: 2021-07-20 | End: 2021-07-20

## 2021-07-20 RX ORDER — WARFARIN SODIUM 2.5 MG/1
10 TABLET ORAL DAILY
Refills: 0 | Status: DISCONTINUED | OUTPATIENT
Start: 2021-07-20 | End: 2021-07-20

## 2021-07-20 RX ORDER — APIXABAN 2.5 MG/1
1 TABLET, FILM COATED ORAL
Qty: 60 | Refills: 0
Start: 2021-07-20 | End: 2021-08-18

## 2021-07-20 RX ADMIN — LOSARTAN POTASSIUM 100 MILLIGRAM(S): 100 TABLET, FILM COATED ORAL at 12:51

## 2021-07-20 RX ADMIN — Medication 40 MILLIGRAM(S): at 17:27

## 2021-07-20 RX ADMIN — MONTELUKAST 10 MILLIGRAM(S): 4 TABLET, CHEWABLE ORAL at 12:51

## 2021-07-20 RX ADMIN — HEPARIN SODIUM 1900 UNIT(S)/HR: 5000 INJECTION INTRAVENOUS; SUBCUTANEOUS at 01:09

## 2021-07-20 RX ADMIN — Medication 40 MILLIGRAM(S): at 05:02

## 2021-07-20 RX ADMIN — APIXABAN 10 MILLIGRAM(S): 2.5 TABLET, FILM COATED ORAL at 18:22

## 2021-07-20 RX ADMIN — AMLODIPINE BESYLATE 5 MILLIGRAM(S): 2.5 TABLET ORAL at 12:51

## 2021-07-20 NOTE — DIETITIAN INITIAL EVALUATION ADULT. - PERTINENT MEDS FT
MEDICATIONS  (STANDING):  amLODIPine   Tablet 5 milliGRAM(s) Oral daily  furosemide    Tablet 40 milliGRAM(s) Oral two times a day  losartan 100 milliGRAM(s) Oral daily  montelukast 10 milliGRAM(s) Oral daily  rivaroxaban 20 milliGRAM(s) Oral with dinner    MEDICATIONS  (PRN):  ALBUTerol    90 MICROgram(s) HFA Inhaler 2 Puff(s) Inhalation every 6 hours PRN Shortness of Breath and/or Wheezing

## 2021-07-20 NOTE — DIETITIAN INITIAL EVALUATION ADULT. - ADD RECOMMEND
1) Monitor weights, PO intake/diet tolerance, skin integrity, pertinent labs. 2) Patient would likely benefit from outpatient RD services to aid in weight reduction/management.

## 2021-07-20 NOTE — CHART NOTE - NSCHARTNOTEFT_GEN_A_CORE
Called by pharmacy recommended to load for DOAC given pt new to the oral eliquis/xarelto (aware AC for hx of chronic PE). Given pt therapeutic for 5 days on hep gtt recommended to do 2 more day for eliquis or 16 more days of xarelto loading. Spoke with attending will order eliquis 10mg BID X2 days then transition to eliquis 5mg BID for maintenance. [facility] both DOAC is $15coapy

## 2021-07-20 NOTE — PROGRESS NOTE ADULT - PROBLEM SELECTOR PLAN 2
Found to have LE edema, on chronic AC at home.   - Lasix 40mg IVP daily   - standing wt today 330lbs (decreased from prior) Found to have LE edema, on chronic AC at home.   - Lasix 40mg oral BID

## 2021-07-20 NOTE — PROGRESS NOTE ADULT - PROBLEM SELECTOR PLAN 6
FENP: No IVF, lytes PRN, low fat diet, at home on AC with coumadin, on heparin gtt for now pending RHC  Dispo: pending RHC and set up of home O2 FENP: No IVF, lytes PRN, low fat diet, at home on AC with coumadin, on Xarelto  Dispo:  home O2

## 2021-07-20 NOTE — PROGRESS NOTE ADULT - ASSESSMENT
56 yr old woman PMH HTN, PEx2 and LLE DVT on coumadin, p/w progressively worsening VIERA x 2 weeks with associated chest tightness. Found to have acute hypoxic respiratory failure now on NC, unclear etiology, r/o reactive airway disease vs pulmonary HTN vs CTEPH, in addition to possible OHS, OLAMIDE. Pending RHC 56 yr old woman PMH HTN, PEx2 and LLE DVT on coumadin, p/w progressively worsening VIERA x 2 weeks with associated chest tightness. Found to have acute hypoxic respiratory failure now on NC, unclear etiology, r/o reactive airway disease vs pulmonary HTN vs CTEPH, in addition to possible OHS, OLAMIDE.

## 2021-07-20 NOTE — CHART NOTE - NSCHARTNOTEFT_GEN_A_CORE
Pt Carmela Flores   Due to diagnosis of: VIERA (R06.00), respiratory failure (J96.01), and chronic PE (I 27.82)  And O2 sat of:    96% on RA at rest  76% on RA at exercise   98% on O2 at exercise     Above reading taken when pt is in a chronic stable state. Pt requires home O2  Pt needs home filling stationary unit with conserving device and portable system at 3LPM via NC    IVAN 99 months, 24hours/day continuous

## 2021-07-20 NOTE — PROGRESS NOTE ADULT - PROBLEM SELECTOR PLAN 3
TTE unremarkable, preserved EF  - Considering V/Q scan-hold for now, will need RHC first  - Hold coumadin in anticipation of RHC, on heparin gtt for now; PTT therapeutic TTE unremarkable, preserved EF. RHC showed moderate pulmonary hypertension.   -start Xarelto

## 2021-07-20 NOTE — PROGRESS NOTE ADULT - ASSESSMENT
56 year-old morbidly obese F with PMH of PE on Coumadin who presented to the hospital with worsening shortness of breath and desaturation on ambulation. Pulmonary consulted for evaluation of hypoxia in the setting of previous thromboembolic disease.    Dyspnea on Exertion  - Patient s/p RHC, with mild-moderate pulmonary hypertension  - Continue Lasix 40mg PO BID  - Patient will need to follow up outpatient with pulmonary office for PFTs and sleep study  - Patient will require oxygen on discharge  - Follow up autoimmune workup, so far negative  - Patient can likely be discharged tomorrow, 7/21, if tolerating oral diuretics and home oxygen has been instated  - Will e-mail pulmonary office for follow up    Robin Damon, PGY-6  Pulmonary and Critical Care Medicine  [id number] 56 year-old morbidly obese F with PMH of PE on Coumadin who presented to the hospital with worsening shortness of breath and desaturation on ambulation. Pulmonary consulted for evaluation of hypoxia in the setting of previous thromboembolic disease.    Dyspnea on Exertion  - Patient s/p RHC, with mild-moderate pulmonary hypertension  - Continue Lasix 40mg PO BID  - Patient will need to follow up outpatient with pulmonary office for PFTs and sleep study  - Patient will require oxygen on discharge  - Follow up autoimmune workup, so far negative  - Patient can likely be discharged tomorrow, 7/21, if tolerating oral diuretics and home oxygen has been instated  -Pulmonary office e-mailed, patient has an appointment with Dr. Mathew on 8/2/21, including PFTs    Pulmonary/Sleep Clinic  [address] [place] [address]  [phone]    Robin Damon, PGY-6  Pulmonary and Critical Care Medicine  [id number]

## 2021-07-20 NOTE — PROGRESS NOTE ADULT - PROBLEM SELECTOR PLAN 1
Ddx pulm HTN, CTEPH vs reactive airway disease, OHS, OLAMIDE, h/o 2nd hand smoking exposure from ex   - COVID negative, TTE with preserved EF: 61% and grossly normal LVSF, unable to accurately assess RVSF  - CTPA: No main, right, left, or lobar PE. Limited evaluation of segmental and subsegmental pulmonary arteries  - Desats to 91 RA at rest and 80 on RA with ambulation. Will need home O2 set up  - Continue Lasix 40 IVP qd per pulm and cardiology recs, strict I/Os, daily weights, goal negative 1L  - RHC pending Mon 7/19 - NPO at [facility]  - Albuterol PRN  - Recommended outpatient PFTs and sleep study Ddx pulm HTN, CTEPH vs reactive airway disease, OHS, OLAMIDE, h/o 2nd hand smoking exposure from ex . COVID negative, TTE with preserved EF: 61% and grossly normal LVSF, unable to accurately assess RVSF. CTPA: No main, right, left, or lobar PE, limited evaluation of segmental and subsegmental pulmonary arteries  - Desats to 91 RA at rest and 80 on RA with ambulation. Will need home O2 set up  - Continue Lasix 40 oral BID per pulm and cardiology recs, strict I/Os, daily weights, goal negative 1L  - RHC pending Mon 7/19 showed moderate pulmonary HTN  - Albuterol PRN  -Pulmonary office e-mailed, patient has an appointment with Dr. Mathew on 8/2/21, including PFTs

## 2021-07-20 NOTE — PROGRESS NOTE ADULT - SUBJECTIVE AND OBJECTIVE BOX
PROGRESS NOTE:   Authored by eRny Pike MD     Patient is a 56y old  Female who presents with a chief complaint of Dyspnea and chest pressure (19 Jul 2021 11:01)      SUBJECTIVE / OVERNIGHT EVENTS:    ADDITIONAL REVIEW OF SYSTEMS:    MEDICATIONS  (STANDING):  amLODIPine   Tablet 5 milliGRAM(s) Oral daily  furosemide    Tablet 40 milliGRAM(s) Oral two times a day  heparin  Infusion. 1900 Unit(s)/Hr (19 mL/Hr) IV Continuous <Continuous>  losartan 100 milliGRAM(s) Oral daily  montelukast 10 milliGRAM(s) Oral daily    MEDICATIONS  (PRN):  ALBUTerol    90 MICROgram(s) HFA Inhaler 2 Puff(s) Inhalation every 6 hours PRN Shortness of Breath and/or Wheezing  heparin   Injectable 61350 Unit(s) IV Push every 6 hours PRN For aPTT less than 40  heparin   Injectable 5000 Unit(s) IV Push every 6 hours PRN For aPTT between 40 - 57      CAPILLARY BLOOD GLUCOSE        I&O's Summary    19 Jul 2021 07:01  -  20 Jul 2021 07:00  --------------------------------------------------------  IN: 746 mL / OUT: 1850 mL / NET: -1104 mL        PHYSICAL EXAM:  Vital Signs Last 24 Hrs  T(C): 37 (20 Jul 2021 04:26), Max: 37 (19 Jul 2021 21:55)  T(F): 98.6 (20 Jul 2021 04:26), Max: 98.6 (19 Jul 2021 21:55)  HR: 90 (20 Jul 2021 04:26) (71 - 90)  BP: 148/94 (20 Jul 2021 04:26) (119/70 - 148/94)  BP(mean): --  RR: 16 (20 Jul 2021 04:26) (16 - 18)  SpO2: 99% (20 Jul 2021 04:26) (98% - 100%)    GENERAL: No acute distress, well-developed  HEAD:  Atraumatic, Normocephalic  EYES: EOMI, PERRLA, conjunctiva and sclera clear  NECK: Supple, no lymphadenopathy, no JVD  CHEST/LUNG: CTAB; No wheezes, rales, or rhonchi  HEART: Regular rate and rhythm; No murmurs, rubs, or gallops  ABDOMEN: Soft, non-tender, non-distended; normal bowel sounds, no organomegaly  EXTREMITIES:  2+ peripheral pulses b/l, No clubbing, cyanosis, or edema  NEUROLOGY: A&O x 3, no focal deficits  SKIN: No rashes or lesions    LABS:                        14.0   5.47  )-----------( 190      ( 20 Jul 2021 07:04 )             48.2     07-19    139  |  99  |  14  ----------------------------<  101<H>  4.8   |  26  |  0.81    Ca    9.7      19 Jul 2021 05:04  Phos  3.6     07-19  Mg     2.60     07-19      PT/INR - ( 20 Jul 2021 07:04 )   PT: 12.7 sec;   INR: 1.11 ratio         PTT - ( 20 Jul 2021 07:04 )  PTT:102.2 sec            RADIOLOGY & ADDITIONAL TESTS:  Results Reviewed:   Imaging Personally Reviewed:  Electrocardiogram Personally Reviewed:    COORDINATION OF CARE:  Care Discussed with Consultants/Other Providers [Y/N]:  Prior or Outpatient Records Reviewed [Y/N]:   PROGRESS NOTE:   Authored by Reny Pike MD     Patient is a 56y old  Female who presents with a chief complaint of Dyspnea and chest pressure (19 Jul 2021 11:01)      SUBJECTIVE / OVERNIGHT EVENTS:  Patient felt fine, didn't have any chest pain, SOB, fever, chills, N/V/D.     ADDITIONAL REVIEW OF SYSTEMS:    MEDICATIONS  (STANDING):  amLODIPine   Tablet 5 milliGRAM(s) Oral daily  furosemide    Tablet 40 milliGRAM(s) Oral two times a day  heparin  Infusion. 1900 Unit(s)/Hr (19 mL/Hr) IV Continuous <Continuous>  losartan 100 milliGRAM(s) Oral daily  montelukast 10 milliGRAM(s) Oral daily    MEDICATIONS  (PRN):  ALBUTerol    90 MICROgram(s) HFA Inhaler 2 Puff(s) Inhalation every 6 hours PRN Shortness of Breath and/or Wheezing  heparin   Injectable 93399 Unit(s) IV Push every 6 hours PRN For aPTT less than 40  heparin   Injectable 5000 Unit(s) IV Push every 6 hours PRN For aPTT between 40 - 57      CAPILLARY BLOOD GLUCOSE        I&O's Summary    19 Jul 2021 07:01  -  20 Jul 2021 07:00  --------------------------------------------------------  IN: 746 mL / OUT: 1850 mL / NET: -1104 mL        PHYSICAL EXAM:  Vital Signs Last 24 Hrs  T(C): 37 (20 Jul 2021 04:26), Max: 37 (19 Jul 2021 21:55)  T(F): 98.6 (20 Jul 2021 04:26), Max: 98.6 (19 Jul 2021 21:55)  HR: 90 (20 Jul 2021 04:26) (71 - 90)  BP: 148/94 (20 Jul 2021 04:26) (119/70 - 148/94)  BP(mean): --  RR: 16 (20 Jul 2021 04:26) (16 - 18)  SpO2: 99% (20 Jul 2021 04:26) (98% - 100%)    GENERAL: No acute distress, well-developed  HEAD:  Atraumatic, Normocephalic  EYES: EOMI, PERRLA, conjunctiva and sclera clear  NECK: Supple, no lymphadenopathy, no JVD  CHEST/LUNG: CTAB; No wheezes, rales, or rhonchi  HEART: Regular rate and rhythm; No murmurs, rubs, or gallops  ABDOMEN: Soft, non-tender, non-distended; normal bowel sounds, no organomegaly  EXTREMITIES:  2+ peripheral pulses b/l, No clubbing, cyanosis. 1+ pitting edema bilateral  NEUROLOGY: A&O x 3, no focal deficits  SKIN: No rashes or lesions    LABS:                        14.0   5.47  )-----------( 190      ( 20 Jul 2021 07:04 )             48.2     07-19    139  |  99  |  14  ----------------------------<  101<H>  4.8   |  26  |  0.81    Ca    9.7      19 Jul 2021 05:04  Phos  3.6     07-19  Mg     2.60     07-19      PT/INR - ( 20 Jul 2021 07:04 )   PT: 12.7 sec;   INR: 1.11 ratio         PTT - ( 20 Jul 2021 07:04 )  PTT:102.2 sec            RADIOLOGY & ADDITIONAL TESTS:  Results Reviewed:   Imaging Personally Reviewed:  Electrocardiogram Personally Reviewed:    COORDINATION OF CARE:  Care Discussed with Consultants/Other Providers [Y/N]:  Prior or Outpatient Records Reviewed [Y/N]:

## 2021-07-20 NOTE — PROGRESS NOTE ADULT - SUBJECTIVE AND OBJECTIVE BOX
CHIEF COMPLAINT: Dyspnea and chest pressure    Interval Events: No acute events. Patient transitioned to oral Lasix, reports feeling well.    REVIEW OF SYSTEMS:  Constitutional: No fevers or chills.   Eyes: No itching or discharge from the eyes  ENT: No ear pain. No ear discharge. No nasal congestion.  CV: No chest pain. No palpitations. No lightheadedness or dizziness.   Resp: No dyspnea at rest. +Dyspnea on exertion, improved.  GI: No nausea. No vomiting. No diarrhea.  MSK: No joint pain or pain in any extremities  Integumentary: No skin lesions. +Pedal edema, improved  Neurological: No gross motor weakness. No sensory changes.  [x] All other systems negative  [ ] Unable to assess ROS because ________    OBJECTIVE:  ICU Vital Signs Last 24 Hrs  T(C): 37 (20 Jul 2021 04:26), Max: 37 (19 Jul 2021 21:55)  T(F): 98.6 (20 Jul 2021 04:26), Max: 98.6 (19 Jul 2021 21:55)  HR: 90 (20 Jul 2021 04:26) (71 - 90)  BP: 148/94 (20 Jul 2021 04:26) (119/70 - 148/94)  BP(mean): --  ABP: --  ABP(mean): --  RR: 16 (20 Jul 2021 04:26) (16 - 18)  SpO2: 99% (20 Jul 2021 04:26) (98% - 100%)    07-19 @ 07:01  -  07-20 @ 07:00  --------------------------------------------------------  IN: 746 mL / OUT: 1850 mL / NET: -1104 mL    CAPILLARY BLOOD GLUCOSE    PHYSICAL EXAM:  General: Awake, alert, oriented X 3.   HEENT: Atraumatic, normocephalic.   Lymph Nodes: No palpable lymphadenopathy  Neck: Supple  Respiratory: Normal chest expansion. CTAB  Cardiovascular: S1 S2 normal. No murmurs, rubs or gallops.   Abdomen: Soft, non-tender, non-distended. No organomegaly.  Extremities: Warm to touch. Peripheral pulse palpable. +Pedal edema, improved.  Skin: No rashes or skin lesions  Neurological: Motor and sensory examination equal and normal in all four extremities.  Psychiatry: Appropriate mood and affect.    HOSPITAL MEDICATIONS:  MEDICATIONS  (STANDING):  amLODIPine   Tablet 5 milliGRAM(s) Oral daily  furosemide    Tablet 40 milliGRAM(s) Oral two times a day  heparin  Infusion. 1900 Unit(s)/Hr (19 mL/Hr) IV Continuous <Continuous>  losartan 100 milliGRAM(s) Oral daily  montelukast 10 milliGRAM(s) Oral daily  warfarin 10 milliGRAM(s) Oral daily    MEDICATIONS  (PRN):  ALBUTerol    90 MICROgram(s) HFA Inhaler 2 Puff(s) Inhalation every 6 hours PRN Shortness of Breath and/or Wheezing  heparin   Injectable 38473 Unit(s) IV Push every 6 hours PRN For aPTT less than 40  heparin   Injectable 5000 Unit(s) IV Push every 6 hours PRN For aPTT between 40 - 57      LABS:                        14.0   5.47  )-----------( 190      ( 20 Jul 2021 07:04 )             48.2     07-20    141  |  97<L>  |  11  ----------------------------<  100<H>  4.0   |  31  |  0.72    Ca    9.6      20 Jul 2021 07:04  Phos  3.5     07-20  Mg     2.40     07-20      PT/INR - ( 20 Jul 2021 07:04 )   PT: 12.7 sec;   INR: 1.11 ratio         PTT - ( 20 Jul 2021 07:04 )  PTT:102.2 sec          MICROBIOLOGY:     RADIOLOGY:  [ ] Reviewed and interpreted by me    Point of Care Ultrasound Findings:    PFT:    EKG:

## 2021-07-20 NOTE — DIETITIAN INITIAL EVALUATION ADULT. - OTHER INFO
RD visited with patient for length of stay nutrition assessment.  Patient endorses a good appetite. Denies chewing/swallowing difficulties. No reported GI distress reported i.e. nausea, vomiting, diarrhea.  Patient reported she mostly follows a low sodium diet at home, not currently on a therapeutic diet restriction, but will recommend implementing low sodium diet during inpatient stay given h/o HTN and LE edema.  Patient reported usual body weight in range of 334-339 pounds.  Weights trending down, receiving diuretic tx.  Inpatient Weight Trend:  7/13 - 154.1kg, 7/16 - 151.3kg, 7/20 - 149kg.  Patient does not have any nutrition questions or concerns, does not wish to receive diet education at this time.

## 2021-07-20 NOTE — DIETITIAN INITIAL EVALUATION ADULT. - CHIEF COMPLAINT
56 yr old woman PMH HTN, PEx2 and LLE DVT on coumadin, p/w progressively worsening VIERA x 2 weeks with associated chest tightness. Found to have acute hypoxic respiratory failure now on NC, unclear etiology, r/o reactive airway disease vs pulmonary HTN vs CTEPH, in addition to possible OHS, OLAMIDE. Pending RHC.

## 2021-07-21 ENCOUNTER — TRANSCRIPTION ENCOUNTER (OUTPATIENT)
Age: 56
End: 2021-07-21

## 2021-07-21 VITALS
TEMPERATURE: 98 F | SYSTOLIC BLOOD PRESSURE: 130 MMHG | DIASTOLIC BLOOD PRESSURE: 91 MMHG | HEART RATE: 83 BPM | OXYGEN SATURATION: 100 % | RESPIRATION RATE: 18 BRPM

## 2021-07-21 LAB
ANION GAP SERPL CALC-SCNC: 12 MMOL/L — SIGNIFICANT CHANGE UP (ref 7–14)
APTT BLD: 31.9 SEC — SIGNIFICANT CHANGE UP (ref 27–36.3)
BASOPHILS # BLD AUTO: 0.03 K/UL — SIGNIFICANT CHANGE UP (ref 0–0.2)
BASOPHILS NFR BLD AUTO: 0.5 % — SIGNIFICANT CHANGE UP (ref 0–2)
BUN SERPL-MCNC: 16 MG/DL — SIGNIFICANT CHANGE UP (ref 7–23)
CALCIUM SERPL-MCNC: 9.5 MG/DL — SIGNIFICANT CHANGE UP (ref 8.4–10.5)
CHLORIDE SERPL-SCNC: 99 MMOL/L — SIGNIFICANT CHANGE UP (ref 98–107)
CO2 SERPL-SCNC: 30 MMOL/L — SIGNIFICANT CHANGE UP (ref 22–31)
CREAT SERPL-MCNC: 0.8 MG/DL — SIGNIFICANT CHANGE UP (ref 0.5–1.3)
EOSINOPHIL # BLD AUTO: 0.13 K/UL — SIGNIFICANT CHANGE UP (ref 0–0.5)
EOSINOPHIL NFR BLD AUTO: 2.3 % — SIGNIFICANT CHANGE UP (ref 0–6)
GLUCOSE SERPL-MCNC: 104 MG/DL — HIGH (ref 70–99)
HCT VFR BLD CALC: 46.9 % — HIGH (ref 34.5–45)
HGB BLD-MCNC: 13.7 G/DL — SIGNIFICANT CHANGE UP (ref 11.5–15.5)
IANC: 3.67 K/UL — SIGNIFICANT CHANGE UP (ref 1.5–8.5)
IMM GRANULOCYTES NFR BLD AUTO: 0.3 % — SIGNIFICANT CHANGE UP (ref 0–1.5)
INR BLD: 1.3 RATIO — HIGH (ref 0.88–1.16)
LYMPHOCYTES # BLD AUTO: 1.3 K/UL — SIGNIFICANT CHANGE UP (ref 1–3.3)
LYMPHOCYTES # BLD AUTO: 22.6 % — SIGNIFICANT CHANGE UP (ref 13–44)
MAGNESIUM SERPL-MCNC: 2.5 MG/DL — SIGNIFICANT CHANGE UP (ref 1.6–2.6)
MCHC RBC-ENTMCNC: 27.8 PG — SIGNIFICANT CHANGE UP (ref 27–34)
MCHC RBC-ENTMCNC: 29.2 GM/DL — LOW (ref 32–36)
MCV RBC AUTO: 95.1 FL — SIGNIFICANT CHANGE UP (ref 80–100)
MONOCYTES # BLD AUTO: 0.6 K/UL — SIGNIFICANT CHANGE UP (ref 0–0.9)
MONOCYTES NFR BLD AUTO: 10.4 % — SIGNIFICANT CHANGE UP (ref 2–14)
NEUTROPHILS # BLD AUTO: 3.67 K/UL — SIGNIFICANT CHANGE UP (ref 1.8–7.4)
NEUTROPHILS NFR BLD AUTO: 63.9 % — SIGNIFICANT CHANGE UP (ref 43–77)
NRBC # BLD: 0 /100 WBCS — SIGNIFICANT CHANGE UP
NRBC # FLD: 0 K/UL — SIGNIFICANT CHANGE UP
PHOSPHATE SERPL-MCNC: 3.3 MG/DL — SIGNIFICANT CHANGE UP (ref 2.5–4.5)
PLATELET # BLD AUTO: 178 K/UL — SIGNIFICANT CHANGE UP (ref 150–400)
POTASSIUM SERPL-MCNC: 4.2 MMOL/L — SIGNIFICANT CHANGE UP (ref 3.5–5.3)
POTASSIUM SERPL-SCNC: 4.2 MMOL/L — SIGNIFICANT CHANGE UP (ref 3.5–5.3)
PROTHROM AB SERPL-ACNC: 14.6 SEC — HIGH (ref 10.6–13.6)
RBC # BLD: 4.93 M/UL — SIGNIFICANT CHANGE UP (ref 3.8–5.2)
RBC # FLD: 14.3 % — SIGNIFICANT CHANGE UP (ref 10.3–14.5)
SODIUM SERPL-SCNC: 141 MMOL/L — SIGNIFICANT CHANGE UP (ref 135–145)
WBC # BLD: 5.75 K/UL — SIGNIFICANT CHANGE UP (ref 3.8–10.5)
WBC # FLD AUTO: 5.75 K/UL — SIGNIFICANT CHANGE UP (ref 3.8–10.5)

## 2021-07-21 PROCEDURE — 99232 SBSQ HOSP IP/OBS MODERATE 35: CPT | Mod: GC

## 2021-07-21 PROCEDURE — 99239 HOSP IP/OBS DSCHRG MGMT >30: CPT | Mod: GC

## 2021-07-21 RX ORDER — FUROSEMIDE 40 MG
1 TABLET ORAL
Qty: 60 | Refills: 0
Start: 2021-07-21 | End: 2021-08-19

## 2021-07-21 RX ORDER — WARFARIN SODIUM 2.5 MG/1
1 TABLET ORAL
Qty: 0 | Refills: 0 | DISCHARGE

## 2021-07-21 RX ORDER — APIXABAN 2.5 MG/1
1 TABLET, FILM COATED ORAL
Qty: 60 | Refills: 0
Start: 2021-07-21 | End: 2021-08-19

## 2021-07-21 RX ORDER — FUROSEMIDE 40 MG
1 TABLET ORAL
Qty: 0 | Refills: 0 | DISCHARGE
Start: 2021-07-21 | End: 2021-08-19

## 2021-07-21 RX ADMIN — LOSARTAN POTASSIUM 100 MILLIGRAM(S): 100 TABLET, FILM COATED ORAL at 05:31

## 2021-07-21 RX ADMIN — APIXABAN 10 MILLIGRAM(S): 2.5 TABLET, FILM COATED ORAL at 17:22

## 2021-07-21 RX ADMIN — MONTELUKAST 10 MILLIGRAM(S): 4 TABLET, CHEWABLE ORAL at 13:17

## 2021-07-21 RX ADMIN — APIXABAN 10 MILLIGRAM(S): 2.5 TABLET, FILM COATED ORAL at 05:30

## 2021-07-21 RX ADMIN — Medication 40 MILLIGRAM(S): at 05:31

## 2021-07-21 RX ADMIN — Medication 40 MILLIGRAM(S): at 17:17

## 2021-07-21 RX ADMIN — AMLODIPINE BESYLATE 5 MILLIGRAM(S): 2.5 TABLET ORAL at 05:31

## 2021-07-21 NOTE — PROGRESS NOTE ADULT - TIME BILLING
Medical management as above, review of results/records, discussion with patient and primary team.
Medical management as above, review of results/records, discussion with patient and primary team.
reviewing chart and coordinating care with primary team.
reviewing chart and coordinating care with primary team.
Medical management as above, review of results/records, discussion with consultants

## 2021-07-21 NOTE — PROGRESS NOTE ADULT - ASSESSMENT
56 year-old morbidly obese F with PMH of PE on Coumadin who presented to the hospital with worsening shortness of breath and desaturation on ambulation. Pulmonary consulted for evaluation of hypoxia in the setting of previous thromboembolic disease.    Dyspnea on Exertion  - Patient s/p RHC, with mild-moderate pulmonary hypertension  - Continue Lasix 40mg PO BID  - Patient will need to follow up outpatient with pulmonary office for PFTs and sleep study  - Patient will require oxygen on discharge  - Follow up autoimmune workup, so far negative  - No pulmonary contraindication to discharge once home services have been set up  - Pulmonary office e-mailed yesterday, patient has an appointment with Dr. Mathew on 8/2/21, including PFTs at that time    Pulmonary/Sleep Clinic  [address] [place] [address]  [phone]    Robin Damon, PGY-6  Pulmonary and Critical Care Medicine  [id number]

## 2021-07-21 NOTE — DISCHARGE NOTE PROVIDER - NSFOLLOWUPCLINICS_GEN_ALL_ED_FT
[facility] General Internal Medicine  General Internal Medicine  [address] [id number]  Phone: [phone]  Fax:   Follow Up Time: 1 week

## 2021-07-21 NOTE — PROGRESS NOTE ADULT - PROBLEM SELECTOR PLAN 3
TTE unremarkable, preserved EF. RHC showed moderate pulmonary hypertension.   -start Xarelto TTE unremarkable, preserved EF. RHC showed moderate pulmonary hypertension.   -switched from warfarin to Eliquis  -informed NP Casimiro about patient's admission, hospital course and change of anticoagulants

## 2021-07-21 NOTE — PROGRESS NOTE ADULT - PROBLEM SELECTOR PLAN 4
Chronic. Outpatient management  weight loss advised
Chronic. Outpatient management  weight loss advised
-Echo pending for pulm HTN  -Consider V/Q scan  -Continue Warfarin 10mg Qd  -daily INR inpatient: goal INR: 2-3
BP acceptable  Continue home losartan and amlodipine
BP acceptable  Continue home losartan and amlodipine
Chronic. Outpatient management  weight loss advised
BP acceptable  Continue home losartan and amlodipine
BP acceptable  Continue home losartan and amlodipine
Chronic. Outpatient management  weight loss advised

## 2021-07-21 NOTE — PROGRESS NOTE ADULT - PROBLEM SELECTOR PLAN 1
Ddx pulm HTN, CTEPH vs reactive airway disease, OHS, OLAMIDE, h/o 2nd hand smoking exposure from ex . COVID negative, TTE with preserved EF: 61% and grossly normal LVSF, unable to accurately assess RVSF. CTPA: No main, right, left, or lobar PE, limited evaluation of segmental and subsegmental pulmonary arteries  - Desats to 91 RA at rest and 80 on RA with ambulation. Will need home O2 set up  - Continue Lasix 40 oral BID per pulm and cardiology recs, strict I/Os, daily weights, goal negative 1L  - RHC pending Mon 7/19 showed moderate pulmonary HTN  - Albuterol PRN  -Pulmonary office e-mailed, patient has an appointment with Dr. Mathew on 8/2/21, including PFTs

## 2021-07-21 NOTE — PROGRESS NOTE ADULT - PROBLEM SELECTOR PROBLEM 1
Acute respiratory failure with hypoxia
VIERA (dyspnea on exertion)
Acute respiratory failure with hypoxia

## 2021-07-21 NOTE — PROGRESS NOTE ADULT - REASON FOR ADMISSION
Dyspnea and chest pressure

## 2021-07-21 NOTE — PROGRESS NOTE ADULT - PROBLEM SELECTOR PROBLEM 5
Need for prophylactic measure
Essential hypertension
Need for prophylactic measure
Essential hypertension
Essential hypertension
Need for prophylactic measure
Essential hypertension
Need for prophylactic measure
Essential hypertension

## 2021-07-21 NOTE — PROGRESS NOTE ADULT - PROBLEM SELECTOR PROBLEM 4
Morbid obesity
Essential hypertension
Essential hypertension
Morbid obesity
Essential hypertension
Chronic pulmonary embolism, unspecified pulmonary embolism type, unspecified whether acute cor pulmonale present
Morbid obesity
Essential hypertension
Morbid obesity

## 2021-07-21 NOTE — PROGRESS NOTE ADULT - SUBJECTIVE AND OBJECTIVE BOX
CHIEF COMPLAINT: Dyspnea and chest pressure    Interval Events: No acute events. Reports feeling well this AM.    REVIEW OF SYSTEMS:  Constitutional: No fevers or chills. No weight loss. No fatigue or generalised malaise.  Eyes: No itching or discharge from the eyes  ENT: No ear pain. No ear discharge. No nasal congestion.  CV: No chest pain. No palpitations. No lightheadedness or dizziness.   Resp: No dyspnea at rest. +Dyspnea on exertion.  GI: No nausea. No vomiting. No diarrhea.  MSK: No joint pain or pain in any extremities  Integumentary: No skin lesions.   Neurological: No gross motor weakness. No sensory changes.  [x] All other systems negative  [ ] Unable to assess ROS because ________    OBJECTIVE:  ICU Vital Signs Last 24 Hrs  T(C): 36.9 (21 Jul 2021 06:09), Max: 36.9 (20 Jul 2021 21:20)  T(F): 98.4 (21 Jul 2021 06:09), Max: 98.4 (20 Jul 2021 21:20)  HR: 84 (21 Jul 2021 06:09) (72 - 100)  BP: 114/79 (21 Jul 2021 06:09) (107/60 - 142/92)  BP(mean): --  ABP: --  ABP(mean): --  RR: 18 (21 Jul 2021 06:09) (16 - 18)  SpO2: 100% (21 Jul 2021 06:09) (88% - 100%)        CAPILLARY BLOOD GLUCOSE          PHYSICAL EXAM:  General: Awake, alert, oriented X 3.   HEENT: Atraumatic, normocephalic.   Lymph Nodes: No palpable lymphadenopathy  Neck: No JVD. No carotid bruit.   Respiratory: Normal chest expansion. CTAB  Cardiovascular: S1 S2 normal. No murmurs, rubs or gallops.   Abdomen: Soft, non-tender, non-distended. No organomegaly.  Extremities: Warm to touch. Peripheral pulse palpable.  Skin: No rashes or skin lesions  Neurological: Motor and sensory examination equal and normal in all four extremities.  Psychiatry: Appropriate mood and affect.    HOSPITAL MEDICATIONS:  MEDICATIONS  (STANDING):  amLODIPine   Tablet 5 milliGRAM(s) Oral daily  apixaban 10 milliGRAM(s) Oral every 12 hours  furosemide    Tablet 40 milliGRAM(s) Oral two times a day  losartan 100 milliGRAM(s) Oral daily  montelukast 10 milliGRAM(s) Oral daily    MEDICATIONS  (PRN):  ALBUTerol    90 MICROgram(s) HFA Inhaler 2 Puff(s) Inhalation every 6 hours PRN Shortness of Breath and/or Wheezing      LABS:                        13.7   5.75  )-----------( 178      ( 21 Jul 2021 06:37 )             46.9     07-21    141  |  99  |  16  ----------------------------<  104<H>  4.2   |  30  |  0.80    Ca    9.5      21 Jul 2021 06:37  Phos  3.3     07-21  Mg     2.50     07-21      PT/INR - ( 21 Jul 2021 06:37 )   PT: 14.6 sec;   INR: 1.30 ratio         PTT - ( 21 Jul 2021 06:37 )  PTT:31.9 sec          MICROBIOLOGY:     RADIOLOGY:  [ ] Reviewed and interpreted by me    Point of Care Ultrasound Findings:    PFT:    EKG:

## 2021-07-21 NOTE — DISCHARGE NOTE PROVIDER - CARE PROVIDERS DIRECT ADDRESSES
[email or web address]

## 2021-07-21 NOTE — DISCHARGE NOTE NURSING/CASE MANAGEMENT/SOCIAL WORK - PATIENT PORTAL LINK FT
You can access the FollowMyHealth Patient Portal offered by [facility] by registering at the following website: [email or web address]. By joining [employer]’s FollowMyHealth portal, you will also be able to view your health information using other applications (apps) compatible with our system.

## 2021-07-21 NOTE — PROGRESS NOTE ADULT - SUBJECTIVE AND OBJECTIVE BOX
PROGRESS NOTE:   Authored by Reny Pike MD     Patient is a 56y old  Female who presents with a chief complaint of Dyspnea and chest pressure (20 Jul 2021 14:34)      SUBJECTIVE / OVERNIGHT EVENTS:    ADDITIONAL REVIEW OF SYSTEMS:    MEDICATIONS  (STANDING):  amLODIPine   Tablet 5 milliGRAM(s) Oral daily  apixaban 10 milliGRAM(s) Oral every 12 hours  furosemide    Tablet 40 milliGRAM(s) Oral two times a day  losartan 100 milliGRAM(s) Oral daily  montelukast 10 milliGRAM(s) Oral daily    MEDICATIONS  (PRN):  ALBUTerol    90 MICROgram(s) HFA Inhaler 2 Puff(s) Inhalation every 6 hours PRN Shortness of Breath and/or Wheezing      CAPILLARY BLOOD GLUCOSE        I&O's Summary      PHYSICAL EXAM:  Vital Signs Last 24 Hrs  T(C): 36.9 (21 Jul 2021 06:09), Max: 36.9 (20 Jul 2021 21:20)  T(F): 98.4 (21 Jul 2021 06:09), Max: 98.4 (20 Jul 2021 21:20)  HR: 84 (21 Jul 2021 06:09) (72 - 100)  BP: 114/79 (21 Jul 2021 06:09) (107/60 - 142/92)  BP(mean): --  RR: 18 (21 Jul 2021 06:09) (16 - 18)  SpO2: 100% (21 Jul 2021 06:09) (88% - 100%)    GENERAL: No acute distress, well-developed  HEAD:  Atraumatic, Normocephalic  EYES: EOMI, PERRLA, conjunctiva and sclera clear  NECK: Supple, no lymphadenopathy, no JVD  CHEST/LUNG: CTAB; No wheezes, rales, or rhonchi  HEART: Regular rate and rhythm; No murmurs, rubs, or gallops  ABDOMEN: Soft, non-tender, non-distended; normal bowel sounds, no organomegaly  EXTREMITIES:  2+ peripheral pulses b/l, No clubbing, cyanosis, or edema  NEUROLOGY: A&O x 3, no focal deficits  SKIN: No rashes or lesions    LABS:                        13.7   5.75  )-----------( 178      ( 21 Jul 2021 06:37 )             46.9     07-21    141  |  99  |  16  ----------------------------<  104<H>  4.2   |  30  |  0.80    Ca    9.5      21 Jul 2021 06:37  Phos  3.3     07-21  Mg     2.50     07-21      PT/INR - ( 21 Jul 2021 06:37 )   PT: 14.6 sec;   INR: 1.30 ratio         PTT - ( 21 Jul 2021 06:37 )  PTT:31.9 sec            RADIOLOGY & ADDITIONAL TESTS:  Results Reviewed:   Imaging Personally Reviewed:  Electrocardiogram Personally Reviewed:    COORDINATION OF CARE:  Care Discussed with Consultants/Other Providers [Y/N]:  Prior or Outpatient Records Reviewed [Y/N]:   PROGRESS NOTE:   Authored by Reny Pike MD     Patient is a 56y old  Female who presents with a chief complaint of Dyspnea and chest pressure (20 Jul 2021 14:34)      SUBJECTIVE / OVERNIGHT EVENTS:  Patient felt fine this morning. She denied chest pain, SOB, N/V/D.     ADDITIONAL REVIEW OF SYSTEMS:    MEDICATIONS  (STANDING):  amLODIPine   Tablet 5 milliGRAM(s) Oral daily  apixaban 10 milliGRAM(s) Oral every 12 hours  furosemide    Tablet 40 milliGRAM(s) Oral two times a day  losartan 100 milliGRAM(s) Oral daily  montelukast 10 milliGRAM(s) Oral daily    MEDICATIONS  (PRN):  ALBUTerol    90 MICROgram(s) HFA Inhaler 2 Puff(s) Inhalation every 6 hours PRN Shortness of Breath and/or Wheezing      CAPILLARY BLOOD GLUCOSE        I&O's Summary      PHYSICAL EXAM:  Vital Signs Last 24 Hrs  T(C): 36.9 (21 Jul 2021 06:09), Max: 36.9 (20 Jul 2021 21:20)  T(F): 98.4 (21 Jul 2021 06:09), Max: 98.4 (20 Jul 2021 21:20)  HR: 84 (21 Jul 2021 06:09) (72 - 100)  BP: 114/79 (21 Jul 2021 06:09) (107/60 - 142/92)  BP(mean): --  RR: 18 (21 Jul 2021 06:09) (16 - 18)  SpO2: 100% (21 Jul 2021 06:09) (88% - 100%)    GENERAL: No acute distress, well-developed  NECK: Supple, no lymphadenopathy, no JVD  CHEST/LUNG: CTAB; No wheezes, rales, or rhonchi  HEART: Regular rate and rhythm; No murmurs, rubs, or gallops  ABDOMEN: Soft, non-tender, non-distended; normal bowel sounds, no organomegaly  EXTREMITIES:  2+ peripheral pulses b/l, No clubbing, cyanosis. 1+ non-pitting edema bilaterally  NEUROLOGY: A&O x 3, no focal deficits  SKIN: No rashes or lesions    LABS:                        13.7   5.75  )-----------( 178      ( 21 Jul 2021 06:37 )             46.9     07-21    141  |  99  |  16  ----------------------------<  104<H>  4.2   |  30  |  0.80    Ca    9.5      21 Jul 2021 06:37  Phos  3.3     07-21  Mg     2.50     07-21      PT/INR - ( 21 Jul 2021 06:37 )   PT: 14.6 sec;   INR: 1.30 ratio         PTT - ( 21 Jul 2021 06:37 )  PTT:31.9 sec            RADIOLOGY & ADDITIONAL TESTS:  Results Reviewed:   Imaging Personally Reviewed:  Electrocardiogram Personally Reviewed:    COORDINATION OF CARE:  Care Discussed with Consultants/Other Providers [Y/N]:  Prior or Outpatient Records Reviewed [Y/N]:

## 2021-07-21 NOTE — DISCHARGE NOTE NURSING/CASE MANAGEMENT/SOCIAL WORK - BRAND OF COVID-19 VACCINATION
"-- Message is from the [employer]--    Reason for Call: Patients mom is calling again regarding blood work she states someone from the office called her please call her back     Caller Information       Type Contact Phone    01/16/2019 04:33 PM Phone (Incoming) Shonda Turner (Mother) [phone]          Alternative phone number: [phone] time given to caller: ""This message will be sent to [facility] Provider's name]. The clinical team will fulfill your request as soon as they review your message. \""    "
"-- Message is from the [employer]--    Reason for Call: patients mom is returning the call regarding results, please call back. Caller Information       Type Contact Phone    01/16/2019 04:33 PM Phone (Incoming) Jose Costello (Mother) [phone]          Alternative phone number: no    Turnaround time given to caller: ""This message will be sent to [facility] Provider's name]. The clinical team will fulfill your request as soon as they review your message. \""    "
Spoke to father, relayed normal labs, verified appt date/time/location, advised to continue current levothyroxine, and advised to keep f/u appointment
Moderna dose 1 and 2

## 2021-07-21 NOTE — DISCHARGE NOTE PROVIDER - CARE PROVIDER_API CALL
Massimo Mathew)  Critical Care Medicine; Internal Medicine; Pulmonary Disease  [address] [id number]  Phone: [phone]  Fax: [phone]  Follow Up Time: 1 week

## 2021-07-21 NOTE — PROGRESS NOTE ADULT - PROVIDER SPECIALTY LIST ADULT
Pulmonology
Internal Medicine
Pulmonology
Pulmonology
Internal Medicine
Internal Medicine
Hospitalist
Internal Medicine

## 2021-07-21 NOTE — DISCHARGE NOTE PROVIDER - NSDCCPCAREPLAN_GEN_ALL_CORE_FT
PRINCIPAL DISCHARGE DIAGNOSIS  Diagnosis: VIERA (dyspnea on exertion)  Assessment and Plan of Treatment: You were admitted due to increasing shortness of breath. You were given lasix and oxygen supplement. Labs and EKG showed that you did not have a heart attack. COVID was neg. Ultrasound of your heart was normal. Right heart catheter showed that you have moderate pulmonary hypertension. CT chest showed no pulmonary embolism. Home oxygen was set up. Appointment was made for you to see Dr. Mathew on 8/2/21 for lungs check up. Your warfarin was changed to Eliquis. Please follow up with your primary care physician within 1 week of discharge. Please see the pulmonologist that was set up for you.

## 2021-07-21 NOTE — PROGRESS NOTE ADULT - ATTENDING COMMENTS
56 F htn, PE/DVT on coumadin here with acute hypoxemic respiratory failure and dypsnea of unclear etiology.    Subjectively feels better today with improved edema. She continues to have mild dyspnea and remains on supplemental O2.    #acute hypoxemic respiratory failure   - continue supplemental O2 to maintain O2 sats > 90%  - incentive spirometer and aerobika to facilitate breathing and oxygenation  #dyspnea on exertion  #LE edema  - will need more definitive evaluation of whether she has elevated RV pressures, plan for RHC next week with Cardiology  - continue with diuresis given LE edema  - would hold off on v/q scan for now given she does not have objective evidence of pulm htn; if she has confirmed pulm htn, then would check v/q  - f/u autoimmune serologies - patient has a very strong family history of rheumatologic disease  - c/w AC for PE  - outpatient sleep study .  #R/O pulmonary hypertension    Patient will require outpatient pulmonary followup after discharge. Please email [email or web address] as patient approaches discharge to arrange for outpatient pulmonary followup.   Pulmonary Consult team will continue to follow-up. Please call with questions.
56 F htn, PE/DVT on coumadin here with acute hypoxemic respiratory failure and dyspnea of unclear etiology.    Subjectively feels better today with improved edema. She continues to have mild dyspnea and remains on supplemental O2. She underwent a RHC which showed mild pulmonary hypertension with elevated wedge pressures. Her PHTN likely is multifactorial.     #acute hypoxemic respiratory failure   - continue supplemental O2 to maintain O2 sats > 90%  - incentive spirometer and aerobika to facilitate breathing and oxygenation  #dyspnea on exertion  #LE edema  -Will need more aggressive diuresis especially when going home. Can do Lasix 40mg PO BID  - would hold off on v/q scan for now given she has mild pulmonary hypertension with elevated wedge pressure  - f/u autoimmune serologies - patient has a very strong family history of rheumatologic disease  - c/w AC for PE  # OHS and nocturnal hypoventilation  - outpatient sleep study .
56 F htn, PE/DVT on coumadin here with acute hypoxemic respiratory failure and dyspnea of unclear etiology.    Subjectively feels better today with improved edema. She continues to have mild dyspnea and remains on supplemental O2. She underwent a RHC which showed mild pulmonary hypertension with elevated wedge pressures. Her PHTN likely is multifactorial.     #acute hypoxemic respiratory failure   - continue supplemental O2 to maintain O2 sats > 90%  - incentive spirometer and aerobika to facilitate breathing and oxygenation  #dyspnea on exertion  #LE edema  -Will need more aggressive diuresis especially when going home. Can do Lasix 40mg PO BID  - would hold off on v/q scan for now given she has mild pulmonary hypertension with elevated wedge pressure  - f/u autoimmune serologies - patient has a very strong family history of rheumatologic disease  - c/w AC for PE  # OHS and nocturnal hypoventilation  - outpatient sleep study .    We can help expedite her outpatient appointment once she is ready to be discharged.
56 F htn, PE/DVT on coumadin here with acute hypoxemic respiratory failure and dypsnea of unclear etiology.    Subjectively feels better today - still with dyspnea, but says it's not as bad today compared to prior.  Still on NC.  Edema still present.    #acute hypoxemic respiratory failure   #dyspnea on exertion  #LE edema  - will need more definitive evaluation of whether she has elevated RV pressures, plan for RHC tomorrow or Monday  - continue with diuresis given LE edema  - would hold off on v/q scan for now given she does not have objective evidence of pulm htn; if she has confirmed pulm htn, then would consider v/q  - f/u autoimmune serologies  - c/w a/c for PE  outpatient sleep study .
56 F htn, PE/DVT on coumadin here with acute hypoxemic respiratory failure and dyspnea of unclear etiology.    Subjectively feels better today with improved edema. She continues to have mild dyspnea and remains on supplemental O2. She underwent a RHC which showed mild pulmonary hypertension with elevated wedge pressures. Her PHTN likely is multifactorial.     #acute hypoxemic respiratory failure   - continue supplemental O2 to maintain O2 sats > 90%  - incentive spirometer and aerobika to facilitate breathing and oxygenation  #dyspnea on exertion  #LE edema  -Will need more aggressive diuresis especially when going home. Can do Lasix 40mg PO BID  - would hold off on v/q scan for now given she has mild pulmonary hypertension with elevated wedge pressure  - f/u autoimmune serologies - patient has a very strong family history of rheumatologic disease  - c/w AC for PE  # OHS and nocturnal hypoventilation  - outpatient sleep study .
56 yr old woman PMH HTN, PEx2 and LLE DVT on coumadin, p/w progressively worsening VIERA x 2 weeks with associated chest tightness. Found to have acute hypoxic respiratory failure now on NC, unclear etiology, r/o reactive airway disease vs pulmonary HTN vs CTEPH, in addition to possible OHS, OLAMIDE. Pending RHC. Currently on heparin gtt given plan for RHC on 7/19. Continued on IV Lasix for LE edema.
56 yr old woman PMH HTN, PEx2 and LLE DVT on coumadin, p/w progressively worsening VIERA x 2 weeks with associated chest tightness. Found to have acute hypoxic respiratory failure now on NC, unclear etiology, r/o reactive airway disease vs pulmonary HTN vs CTEPH, in addition to possible OHS, OLAMIDE.     Patient stable on NC, SOB improving on exertion    RHC with mild-mod pulm HTN  Switch to PO lasix 40 BID starting 7/20 per pulm recs  Resume heparin gtt and bridge to coumadin. Dose coumadin 10mg tonight. INR goal 2-3  Pulm appt to be made prior to DC. Needs outpatient PFTs, sleep study  DC pending therapeutic INR and home O2 set up
56 yr old woman PMH HTN, PEx2 and LLE DVT on coumadin, p/w progressively worsening VIERA x 2 weeks with associated chest tightness. Found to have acute hypoxic respiratory failure now on NC, unclear etiology, r/o reactive airway disease in addition to possible OHS, OLAMIDE. Improving with diuresis and found to have mild-mod pulm HTN on RHC.    Patient stable on NC, SOB improving on exertion, LE edema improving    Switch to Eliquis 10mg BID x 2more days total and then 5mg BID, copay $15 to which patient is agreeable  C/w PO lasix 40 BID  Pulmonary appointment with Dr. Mathew on 8/2/21, including PFTs  Stable for DC once home O2 set up. DC time: 32 min  Resident updated PCP office, NP Casimiro Coburn covering for Dr. Calles: [phone]
56 yr old woman PMH HTN, PEx2 and LLE DVT on coumadin, p/w progressively worsening VIERA x 2 weeks with associated chest tightness. Found to have acute hypoxic respiratory failure now on NC, unclear etiology, r/o reactive airway disease vs pulmonary HTN vs CTEPH, in addition to possible OHS, OLAMIDE.     Patient stable on NC, SOB improving on exertion, LE edema improving    RHC with mild-mod pulm HTN  C/w PO lasix 40 BID  Xarelto copay $15, will switch to PO Xarelto, DC heparin gtt  Pulmonary office e-mailed, patient has an appointment with Dr. Mathew on 8/2/21, including PFTs  DC pending home O2 set up likely for tmw 7/21  Will update PCP office, LIZETTE Coburn covering for Dr. Calles: [phone]

## 2021-07-21 NOTE — PROGRESS NOTE ADULT - NSICDXPILOT_GEN_ALL_CORE
[place]

## 2021-07-21 NOTE — DISCHARGE NOTE PROVIDER - NSDCFUSCHEDAPPT_GEN_ALL_CORE_FT
ZAHRAA MEJIA ; 08/02/2021 ; NPP Med Pul [address] [facility]  ZAHRAA MEJIA ; 08/02/2021 ; NP Med Pul [address] [facility]

## 2021-07-21 NOTE — PROGRESS NOTE ADULT - PROBLEM SELECTOR PLAN 6
FENP: No IVF, lytes PRN, low fat diet, at home on AC with coumadin, on Xarelto  Dispo:  home O2 FENP: No IVF, lytes PRN, low fat diet, at home on AC with Eliquis  Dispo:  home O2

## 2021-07-21 NOTE — DISCHARGE NOTE PROVIDER - NSDCMRMEDTOKEN_GEN_ALL_CORE_FT
amLODIPine 5 mg oral tablet: 1 tab(s) orally once a day  apixaban 5 mg oral tablet: Please take 2 tabs twice a day for 1 day then1 tab(s) orally 2 times a day   furosemide 40 mg oral tablet: 1 tab(s) orally 2 times a day  iron sulfate: 324 milligram(s) orally once a day  losartan 100 mg oral tablet: 1 tab(s) orally once a day  montelukast 10 mg oral tablet: 1 tab(s) orally once a day  Please give patient Nasal [place] 3L/ minute. ICD: I27.82,J96.01,R06.00. IVAN: 99 months:

## 2021-07-21 NOTE — DISCHARGE NOTE PROVIDER - HOSPITAL COURSE
57 yo f pmh HTN, PEx2 and LLE DVT on coumadin, p/w VIERA x 2 weeks and now having worsening chest tightness and dyspnea. She started having mild dyspnea on exertion 2 weeks ago and getting progressively worse.. 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 She could barely walk a few feet before having to take a breath. She called PMD who told pt to come to ED. In the past, she had an episode of chest tightness associated with wheezing and was prescribed Montelukast by her PCP. Did not try albuterol.    Patient was admitted for acute hypoxic respiratory failure. Troponin 8->7. EKG without ischemic changes. COVID negative, TTE with preserved EF: 61% and grossly normal LVSF, unable to accurately assess RVSF. CTPA: No main, right, left, or lobar PE, limited evaluation of segmental and subsegmental pulmonary arteries. RHC showed moderate pulmonary hypertension. Desats to 91 RA at rest and 80 on RA with ambulation so home oxygen was set up. Patient was given lasix and leg edema improved. Pulmonary office e-mailed, patient has an appointment with Dr. Mathew on 8/2/21, including PFTs. Her warfarin was switched to Eliquis. On the day of discharge, patient was stable. 57 yo f pmh HTN, PEx2 and LLE DVT on coumadin, p/w VIERA x 2 weeks.    Patient was admitted for acute hypoxic respiratory failure. Troponin 8->7. EKG without ischemic changes. COVID negative, TTE with preserved EF: 61% and grossly normal LVSF, unable to accurately assess RVSF. CTPA: No main, right, left, or lobar PE, limited evaluation of segmental and subsegmental pulmonary arteries. RHC showed moderate pulmonary hypertension. Desats to 91 RA at rest and 80 on RA with ambulation so home oxygen was set up. Patient was given lasix and leg edema improved. Pulmonary office e-mailed, patient has an appointment with Dr. Mathew on 8/2/21, including PFTs. Her warfarin was switched to Eliquis. On the day of discharge, patient was stable.

## 2021-07-23 LAB — B2 GLYCOPROT1 AB SER QL: NEGATIVE — SIGNIFICANT CHANGE UP

## 2021-08-02 ENCOUNTER — APPOINTMENT (OUTPATIENT)
Dept: PULMONOLOGY | Facility: CLINIC | Age: 56
End: 2021-08-02
Payer: COMMERCIAL

## 2021-08-02 VITALS
HEART RATE: 103 BPM | SYSTOLIC BLOOD PRESSURE: 143 MMHG | DIASTOLIC BLOOD PRESSURE: 87 MMHG | BODY MASS INDEX: 53.92 KG/M2 | WEIGHT: 293 LBS | OXYGEN SATURATION: 98 % | TEMPERATURE: 98.2 F | HEIGHT: 62 IN

## 2021-08-02 DIAGNOSIS — Z00.00 ENCOUNTER FOR GENERAL ADULT MEDICAL EXAMINATION W/OUT ABNORMAL FINDINGS: ICD-10-CM

## 2021-08-02 DIAGNOSIS — Z82.69 FAMILY HISTORY OF OTHER DISEASES OF THE MUSCULOSKELETAL SYSTEM AND CONNECTIVE TISSUE: ICD-10-CM

## 2021-08-02 PROCEDURE — 94010 BREATHING CAPACITY TEST: CPT

## 2021-08-02 PROCEDURE — ZZZZZ: CPT

## 2021-08-02 PROCEDURE — 99214 OFFICE O/P EST MOD 30 MIN: CPT | Mod: 25

## 2021-08-02 PROCEDURE — 94726 PLETHYSMOGRAPHY LUNG VOLUMES: CPT

## 2021-08-02 PROCEDURE — 94729 DIFFUSING CAPACITY: CPT

## 2021-08-02 RX ORDER — WARFARIN 10 MG/1
10 TABLET ORAL
Qty: 90 | Refills: 0 | Status: DISCONTINUED | COMMUNITY
Start: 2021-05-26

## 2021-08-02 RX ORDER — AMLODIPINE BESYLATE 5 MG/1
5 TABLET ORAL
Qty: 90 | Refills: 0 | Status: DISCONTINUED | COMMUNITY
Start: 2021-05-26

## 2021-08-02 RX ORDER — MONTELUKAST 10 MG/1
10 TABLET, FILM COATED ORAL
Qty: 30 | Refills: 0 | Status: DISCONTINUED | COMMUNITY
Start: 2021-04-23

## 2021-08-02 RX ORDER — LOSARTAN POTASSIUM 50 MG/1
50 TABLET, FILM COATED ORAL
Qty: 90 | Refills: 0 | Status: DISCONTINUED | COMMUNITY
Start: 2021-04-23

## 2021-08-02 NOTE — ASSESSMENT
[FreeTextEntry1] : 56 year-old F with PMH HTN, PE on Eliquis, and recently diagnosed pulmonary hypertension who presented to the clinic for establishment of care for pulmonary hypertension. Patient is doing well currently, compliant with all medications, and reports improvement in symptoms.\par \par Pulmonary Hypertension\par - RHC reviewed, moderate pulmonary hypertension\par - PFTs reviewed, restrictive disease likely 2/2 obesity\par - Potentially in the setting of L heart disease vs nocturnal hypoxia vs CTEPH\par - Check sleep study and V/Q scan for further evaluation\par - Patient counseled on importance of weight loss, weight management program referral provided\par - Continue supplemental O2 \par - Follow up in 1 month\par \par Discussed with Dr. Mathew\par \par Robin Damon, PGY-6\par Pulmonary and Critical Care Medicine

## 2021-08-02 NOTE — PHYSICAL EXAM
[No Acute Distress] : no acute distress [Well Groomed] : well groomed [Normal Oropharynx] : normal oropharynx [Normal Appearance] : normal appearance [No Neck Mass] : no neck mass [Normal Rate/Rhythm] : normal rate/rhythm [Normal S1, S2] : normal s1, s2 [No Murmurs] : no murmurs [No Resp Distress] : no resp distress [Clear to Auscultation Bilaterally] : clear to auscultation bilaterally [No Abnormalities] : no abnormalities [Benign] : benign [Normal Gait] : normal gait [No Clubbing] : no clubbing [No Cyanosis] : no cyanosis [Normal Color/ Pigmentation] : normal color/ pigmentation [No Focal Deficits] : no focal deficits [Oriented x3] : oriented x3 [Normal Affect] : normal affect [TextBox_2] : Obese [TextBox_105] : +Pitting edema

## 2021-08-02 NOTE — HISTORY OF PRESENT ILLNESS
[Never] : never [TextBox_4] : 56 year-old F with PMH PE on Eliquis, HTN, and pHTN oh home O2 who presents for post-discharge establishment of care. She was recently hospitalized at [facility] with dyspnea on exertion and diagnosed with pulmonary hypertension by right heart cath. She has been doing well, occasionally checking her oxygen when she is at home. On room air, she drops to 84% without supplemental oxygen. She denies chest pain, fevers/chills, wheezing, and dyspnea. She reports compliance with her medications and reports that her LE edema has improved.

## 2021-08-11 ENCOUNTER — OUTPATIENT (OUTPATIENT)
Dept: OUTPATIENT SERVICES | Facility: HOSPITAL | Age: 56
LOS: 1 days | End: 2021-08-11

## 2021-08-11 ENCOUNTER — OUTPATIENT (OUTPATIENT)
Dept: OUTPATIENT SERVICES | Facility: HOSPITAL | Age: 56
LOS: 1 days | End: 2021-08-11
Payer: COMMERCIAL

## 2021-08-11 ENCOUNTER — APPOINTMENT (OUTPATIENT)
Dept: RADIOLOGY | Facility: HOSPITAL | Age: 56
End: 2021-08-11
Payer: COMMERCIAL

## 2021-08-11 ENCOUNTER — APPOINTMENT (OUTPATIENT)
Dept: NUCLEAR MEDICINE | Facility: HOSPITAL | Age: 56
End: 2021-08-11
Payer: COMMERCIAL

## 2021-08-11 DIAGNOSIS — I27.20 PULMONARY HYPERTENSION, UNSPECIFIED: ICD-10-CM

## 2021-08-11 DIAGNOSIS — I26.99 OTHER PULMONARY EMBOLISM WITHOUT ACUTE COR PULMONALE: ICD-10-CM

## 2021-08-11 DIAGNOSIS — Z98.891 HISTORY OF UTERINE SCAR FROM PREVIOUS SURGERY: Chronic | ICD-10-CM

## 2021-08-11 PROCEDURE — 78582 LUNG VENTILAT&PERFUS IMAGING: CPT | Mod: 26,GC

## 2021-08-11 PROCEDURE — 71046 X-RAY EXAM CHEST 2 VIEWS: CPT | Mod: 26

## 2021-08-13 ENCOUNTER — TRANSCRIPTION ENCOUNTER (OUTPATIENT)
Age: 56
End: 2021-08-13

## 2021-08-20 ENCOUNTER — NON-APPOINTMENT (OUTPATIENT)
Age: 56
End: 2021-08-20

## 2021-08-23 ENCOUNTER — TRANSCRIPTION ENCOUNTER (OUTPATIENT)
Age: 56
End: 2021-08-23

## 2021-09-07 ENCOUNTER — APPOINTMENT (OUTPATIENT)
Dept: SLEEP CENTER | Facility: CLINIC | Age: 56
End: 2021-09-07
Payer: COMMERCIAL

## 2021-09-07 ENCOUNTER — OUTPATIENT (OUTPATIENT)
Dept: OUTPATIENT SERVICES | Facility: HOSPITAL | Age: 56
LOS: 1 days | End: 2021-09-07
Payer: COMMERCIAL

## 2021-09-07 DIAGNOSIS — Z98.891 HISTORY OF UTERINE SCAR FROM PREVIOUS SURGERY: Chronic | ICD-10-CM

## 2021-09-07 PROCEDURE — 95806 SLEEP STUDY UNATT&RESP EFFT: CPT | Mod: 26

## 2021-09-07 PROCEDURE — 95806 SLEEP STUDY UNATT&RESP EFFT: CPT

## 2021-09-14 ENCOUNTER — RX RENEWAL (OUTPATIENT)
Age: 56
End: 2021-09-14

## 2021-09-14 DIAGNOSIS — G47.33 OBSTRUCTIVE SLEEP APNEA (ADULT) (PEDIATRIC): ICD-10-CM

## 2021-09-21 ENCOUNTER — NON-APPOINTMENT (OUTPATIENT)
Age: 56
End: 2021-09-21

## 2021-09-21 ENCOUNTER — APPOINTMENT (OUTPATIENT)
Dept: PULMONOLOGY | Facility: CLINIC | Age: 56
End: 2021-09-21
Payer: COMMERCIAL

## 2021-09-21 VITALS
OXYGEN SATURATION: 97 % | TEMPERATURE: 98 F | RESPIRATION RATE: 17 BRPM | BODY MASS INDEX: 53.92 KG/M2 | WEIGHT: 293 LBS | HEART RATE: 100 BPM | HEIGHT: 62 IN | SYSTOLIC BLOOD PRESSURE: 141 MMHG | DIASTOLIC BLOOD PRESSURE: 73 MMHG

## 2021-09-21 PROCEDURE — 99214 OFFICE O/P EST MOD 30 MIN: CPT

## 2021-09-21 RX ORDER — FUROSEMIDE 40 MG/1
40 TABLET ORAL
Qty: 30 | Refills: 0 | Status: DISCONTINUED | COMMUNITY
Start: 2021-08-20 | End: 2021-09-21

## 2021-09-21 NOTE — ASSESSMENT
[FreeTextEntry1] : s/p hospitalization in July for hypoxic respiratory failure -con eliquis and supplemental O2. V/Q scan without evidence of PE.\par Sleep study results indicate moderate sleep apnea- will start auto cpap\par \par On lasix 40 mg bid for pulm htn- will check bmp/bnp. - follow up with cardiology. \par \par \par Anaya MOBLEY NP, am scribing for and in the presence of Dr. Massimo Mathew, the following sections HISTORY OF PRESENT ILLNESS, PAST MEDICAL/FAMILY/SOCIAL HISTORY; REVIEW OF SYSTEMS; VITAL SIGNS; PHYSICAL EXAM; DISPOSITION.\par \par

## 2021-09-21 NOTE — HISTORY OF PRESENT ILLNESS
[TextBox_4] : Pt here for follow up PE/ sleep study results. Continues on eliquis, and supplemental O2 3L  with sats reported 89-upper 90s. \par

## 2021-09-22 LAB
ALBUMIN SERPL ELPH-MCNC: 4.4 G/DL
ALP BLD-CCNC: 156 U/L
ALT SERPL-CCNC: 18 U/L
ANION GAP SERPL CALC-SCNC: 15 MMOL/L
AST SERPL-CCNC: 16 U/L
BILIRUB SERPL-MCNC: 0.4 MG/DL
BUN SERPL-MCNC: 11 MG/DL
CALCIUM SERPL-MCNC: 10.5 MG/DL
CHLORIDE SERPL-SCNC: 101 MMOL/L
CO2 SERPL-SCNC: 29 MMOL/L
CREAT SERPL-MCNC: 0.79 MG/DL
GLUCOSE SERPL-MCNC: 102 MG/DL
NT-PROBNP SERPL-MCNC: 16 PG/ML
POTASSIUM SERPL-SCNC: 4.1 MMOL/L
PROT SERPL-MCNC: 7.2 G/DL
SODIUM SERPL-SCNC: 144 MMOL/L

## 2021-10-21 ENCOUNTER — RESULT CHARGE (OUTPATIENT)
Age: 56
End: 2021-10-21

## 2021-10-22 ENCOUNTER — APPOINTMENT (OUTPATIENT)
Dept: CARDIOLOGY | Facility: CLINIC | Age: 56
End: 2021-10-22
Payer: COMMERCIAL

## 2021-10-22 ENCOUNTER — NON-APPOINTMENT (OUTPATIENT)
Age: 56
End: 2021-10-22

## 2021-10-22 VITALS
HEART RATE: 111 BPM | WEIGHT: 293 LBS | OXYGEN SATURATION: 94 % | SYSTOLIC BLOOD PRESSURE: 122 MMHG | DIASTOLIC BLOOD PRESSURE: 80 MMHG | BODY MASS INDEX: 59.63 KG/M2

## 2021-10-22 VITALS — SYSTOLIC BLOOD PRESSURE: 126 MMHG | DIASTOLIC BLOOD PRESSURE: 90 MMHG

## 2021-10-22 PROCEDURE — 93000 ELECTROCARDIOGRAM COMPLETE: CPT

## 2021-10-22 PROCEDURE — 99205 OFFICE O/P NEW HI 60 MIN: CPT

## 2021-10-22 RX ORDER — APIXABAN 5 MG/1
5 TABLET, FILM COATED ORAL
Qty: 180 | Refills: 1 | Status: DISCONTINUED | COMMUNITY
Start: 2021-08-02 | End: 2021-10-22

## 2021-10-26 ENCOUNTER — APPOINTMENT (OUTPATIENT)
Dept: PULMONOLOGY | Facility: CLINIC | Age: 56
End: 2021-10-26
Payer: COMMERCIAL

## 2021-10-26 VITALS
OXYGEN SATURATION: 97 % | WEIGHT: 293 LBS | HEIGHT: 62 IN | BODY MASS INDEX: 53.92 KG/M2 | DIASTOLIC BLOOD PRESSURE: 95 MMHG | TEMPERATURE: 97.9 F | SYSTOLIC BLOOD PRESSURE: 146 MMHG | RESPIRATION RATE: 18 BRPM | HEART RATE: 110 BPM

## 2021-10-26 PROCEDURE — 99214 OFFICE O/P EST MOD 30 MIN: CPT

## 2021-10-26 NOTE — ASSESSMENT
[FreeTextEntry1] : PE: Con on full dose eliquis for DVT / PE since hospitalization in July- plan to decrease to prophylactic dose after 6 months, approx Jan 2022. \par \par Pulm HTN: Patient has not yet started on cpap, but in view of resting hypoxemia and no evidence left heart disease, will start on pah therapy. Con lasix- Start sildenafil 20 mg qd x 1 week, then bid x 1 week then tid- refer to Dr Byrne for further management of plm htn. Con supplemetnal O2.\par \par OLAMIDE: f/u cpap\par \par I, Anaya Ludwig NP, am scribing for and in the presence of Dr. Massimo Mathew, the following sections HISTORY OF PRESENT ILLNESS, PAST MEDICAL/FAMILY/SOCIAL HISTORY; REVIEW OF SYSTEMS; VITAL SIGNS; PHYSICAL EXAM; DISPOSITION.\par \par

## 2021-10-26 NOTE — HISTORY OF PRESENT ILLNESS
[TextBox_4] : Pt here for follow up pHTN/  PE and OLAMIDE. Pending CPAP delivery- some delay ^ recent recall. Continues on lasix for pHTN, supplemental O2 with sats at 88% on RA at rest.

## 2021-10-29 ENCOUNTER — NON-APPOINTMENT (OUTPATIENT)
Age: 56
End: 2021-10-29

## 2021-10-31 NOTE — CARDIOLOGY SUMMARY
[de-identified] : 10/22/21. Sinus tachycardia. PRWP.  [de-identified] : CONCLUSIONS:\par Technically difficult study.\par 1. Normal mitral valve. Minimal mitral regurgitation.\par 2. Normal left ventricular internal dimensions and wall\par thicknesses.\par 3. Endocardium not well visualized; grossly normal left\par ventricular systolic function.  Endocardial visualization\par enhanced with intravenous injection of echo contrast\par (Definity).\par 4. Unable to accurately evaluate right ventricular size or\par systolic function.\par ------------------------------------------------------------------------\par Confirmed on  7/14/2021 - 12:04:08 by Artem Smith M.D.,\par FACJONATHAN, ARNULFO\par  [de-identified] : \par HEMODYNAMICS: There is mild to moderate pulmonary hypertension.\par  \par COMPLICATIONS: There were no complications.\par  \par DIAGNOSTIC RECOMMENDATIONS: Medical management is recommended.\par  \par Prepared and signed by\par  \par Nasir Shepherd M.D.\par Signed 2021 13:57:07\par  \par HEMODYNAMIC TABLES\par  \par Pressures:  Baseline\par Pressures:  - HR: 82\par Pressures:  - Rhythm:\par Pressures:  -- Aortic Pressure (S/D/M): 141/77/100\par Pressures:  -- Aortic Pressure (S/D/M): 53/20/34\par Pressures:  -- Pulmonary Capillary Wedge: 17/15/13\par Pressures:  -- Right Atrium (a/v/M): 16//10\par Pressures:  -- Right Ventricle (s/edp): 56/11/--\par  \par O2 Sats:  Baseline\par O2 Sats:  - HR: 82\par O2 Sats:  - Rhythm:\par O2 Sats:  -- AO: 13.7/92/17.14\par O2 Sats:  -- PA: 13.7/71.5/13.32\par  \par Outputs:  Baseline\par Outputs:  -- CALCULATIONS[age]par Outputs:  -- CALCULATIONS: Body Surface Area: 2.38\par Outputs:  -- CALCULATIONS: Height in cm: 158.00\par Outputs:  -- CALCULATIONS: Sex: Female\par Outputs:  -- CALCULATIONS: Weight in k.30\par Outputs:  -- OUTPUTS: CO by Ranjan: 7.79\par Outputs:  -- OUTPUTS: Ranjan cardiac index: 3.27\par Outputs:  -- OUTPUTS: O2 consumption: 297.67\par Outputs:  -- OUTPUTS: Vo2 Indexed: 125.00\par Outputs:  -- RESISTANCES: Left ventricular stroke work: 108.48\par Outputs:  -- RESISTANCES: Left Ventricular Stroke Work index: 45.55\par Outputs:  -- RESISTANCES: Pulmonary vascular index (dsc): 513.22\par Outputs:  -- RESISTANCES: Pulmonary vascular index (Wood Units): 6.42\par Outputs:  -- RESISTANCES: Pulmonary vascular resistance (dsc): 215.52\par Outputs:  -- RESISTANCES: Pulmonary vascular resistance (Wood Units): 2.69\par Outputs:  -- RESISTANCES: PVR_SVR Ratio: 0.23\par Outputs:  -- RESISTANCES: Right ventricular stroke work: 29.93\par Outputs:  -- RESISTANCES: Right ventricular stroke work index: 12.57\par Outputs:  -- RESISTANCES: Systemic vascular index (dsc): 2199.52\par Outputs:  -- RESISTANCES: Systemic vascular index (Wood Units): 27.50\par Outputs:  -- RESISTANCES: Systemic vascular resistance (dsc): 923.64\par Outputs:  -- RESISTANCES: Systemic vascular resistance (Wood Units): 11.55\par Outputs:  -- RESISTANCES: Total pulmonary index (dsc): 830.93\par Outputs:  -- RESISTANCES: Total pulmonary index (Wood Units): 10.39\par Outputs:  -- RESISTANCES: Total pulmonary resistance (dsc): 348.93\par Outputs:  -- RESISTANCES: Total pulmonary resistance (Wood Units): 4.36\par Outputs:  -- RESISTANCES: Total vascular index (Wood Units): 30.56\par Outputs:  -- RESISTANCES: Total vascular resistance (dsc): 1026.27\par Outputs:  -- RESISTANCES: Total vascular resistance (Wood Units): 12.83\par Outputs:  -- RESISTANCES: Total vascular resistance index (dsc): 2443.91\par Outputs:  -- RESISTANCES: TPR_TVR Ratio: 0.34\par Outputs:  -- SHUNTS: Pulmonary flow: 7.79\par Outputs:  -- SHUNTS: Qp Indexed: 3.27\par Outputs:  -- SHUNTS: Qs Indexed: 3.27\par Outputs:  -- SHUNTS: Systemic flow: 7.79\par

## 2021-10-31 NOTE — HISTORY OF PRESENT ILLNESS
[FreeTextEntry1] : Dear Dr. Mathew,\par \par Thank you for referring her. \par \par Carmela Flores presents today for evaluation of exertional shortness of breath. She is accompanied by her mother.\par Her history is significant for hypertension, pulmonary embolism on oral anticoagulation, pulmonary  hypertension, obstructive sleep apnea (not yet on CPAP), and morbid obesity.\par In July she was admitted to [facility] with acute hypoxic respiratory failure and ultimately discharged home on supplemental oxygen 24 hours a day. She is currently awaiting DME delivery of a CPAP. \par She reports that prior to hospitalization she was able to ambulate without any real difficulty or shortness of breath. Today she reports that she is dyspneic after walking 100 feet with her supplemental oxygen.\par She denies any chest discomfort, palpitations, lightheadedness or syncope.\par She has no known history of coronary artery disease, diabetes mellitus, smoking or family history of premature coronary artery disease.\par \par \par

## 2021-10-31 NOTE — DISCUSSION/SUMMARY
[FreeTextEntry1] : Carmela Flores is a 56 year old with hypertension, pulmonary embolism on oral anticoagulation, pulmonary  hypertension, obstructive sleep apnea, and morbid obesity with exertional shortness of breath.\par Her ECG today shows sinus tachycardia with poor R-wave progression.\par \par Hypertension- Well controlled on current regimen. She will continue with amlodipine, losartan and furosemide as prescribed.\par \par History of pulmonary embolism- VQ scan from  8/11/2021 reveals heterogenous distribution of radiopharmaceutical in both lungs on the ventilation and perfusion images with no segmental perfusion defects in either lung. She will continue with Eliquis twice daily indefinitely.\par \par Pulmonary hypertension- Right heart catheterization as above.\par Her echocardiogram shows grossly normal left ventricular systolic function.\par \par We discussed the mechanisms of exertional dyspnea to include the possibility of lung abnormalities, cardiac abnormalities and deconditioning. \par \par She has been reassured that a cardiac source for her dyspena is unlikely she will follow up with pulmonology.\par

## 2021-12-07 ENCOUNTER — TRANSCRIPTION ENCOUNTER (OUTPATIENT)
Age: 56
End: 2021-12-07

## 2022-01-06 ENCOUNTER — APPOINTMENT (OUTPATIENT)
Dept: PULMONOLOGY | Facility: CLINIC | Age: 57
End: 2022-01-06

## 2022-01-06 ENCOUNTER — APPOINTMENT (OUTPATIENT)
Dept: PULMONOLOGY | Facility: CLINIC | Age: 57
End: 2022-01-06
Payer: COMMERCIAL

## 2022-01-06 PROCEDURE — 99214 OFFICE O/P EST MOD 30 MIN: CPT | Mod: 95

## 2022-01-06 NOTE — ASSESSMENT
[FreeTextEntry1] : ---Assessment plan----------The patient has been referred here for further opinion regarding pulmonary problem,\par \par 57 yo w/obesity, PE, pulm htn and jerrell\par 1) continue sildenafil and diuretics- keep euvolemic- may add opsumit down the line\par 2) medically necessary to use oxygen x 24hrs\par 3) jerrell- needs cpap- will f/u on status of delivery, wt loss advised\par 4) will need pulm htn labs\par 5) f/u feb 2022 in office\par \par \par Thanks for allowing  me to participate  in the care of this patient.  Patient at this time  will follow  the above mentioned recommendations and return back for follow up visit. If you have any questions  I can be reached  at # [phone] (office).\par \par Rc Byrne MD, FCCP \par Director, Pulmonary Hypertension Program \par [facility] \par Division of Pulmonary, Critical Care and Sleep Medicine \par  Professor of Medicine \par [employer]\par \par Veronica Metz ANP-C\par \par \par \par

## 2022-01-06 NOTE — LETTER BODY
[To Whom it May Concern:] : To Whom it May Concern: [FreeTextEntry1] : Please be advised that Ms. Flores has been under my care since August 2021 for treatment of pulmonary hypertension, obstructive sleep apnea, and pulmonary embolism. She remains limited to exercise and activity, still requiring supplemental oxygen to assist with her ADLs and IADLS. Her current plan of treatment is to treat her pulmonary hypertension and sleep apnea to improve her shortness of breath. She is anticipated to return to work in March 2022, [FreeTextEntry3] : Anaya Ludwig NP\par for \par Massimo Mathew MD

## 2022-01-06 NOTE — REASON FOR VISIT
[Follow-Up] : a follow-up visit [Pulmonary Embolism] : pulmonary embolism [Pulmonary Hypertension] : pulmonary hypertension [Home] : at home, [unfilled] , at the time of the visit. [Medical Office: ([facility])___] : at the medical office located in  [Verbal consent obtained from patient] : the patient, [unfilled]

## 2022-01-06 NOTE — HISTORY OF PRESENT ILLNESS
[TextBox_4] : Pt here for follow up pHTN/  PE and OLAMIDE. Pending CPAP delivery- some delay ^ recent recall. Continues on lasix for pHTN, supplemental O2 with sats at 88% on RA at rest. \par \par CTA chest 7/2021\par IMPRESSION:\par No main, right, left, or lobar pulmonary embolism. Limited evaluation of segmental and subsegmental pulmonary arteries.\par No pneumonia.\par \par VQ scan 7/2021 low prob for pE\par \par PFT 8/2021 moderate to severe restrictive lung disease\par \par HST w/TERESA 16.9- still has not received cpap\par \par echo 7/2021 \par CONCLUSIONS:\par Technically difficult study.\par 1. Normal mitral valve. Minimal mitral regurgitation.\par 2. Normal left ventricular internal dimensions and wall\par thicknesses.\par 3. Endocardium not well visualized; grossly normal left\par ventricular systolic function.  Endocardial visualization\par \par enhanced with intravenous injection of echo contrast\par (Definity).\par 4. Unable to accurately evaluate right ventricular size or\par systolic function.\par \par \par cardiac cath 7/2021 \par mean PA 30\par Aortic Pressure (S/D/M): 141/77/100\par Pressures:  -- Aortic Pressure (S/D/M): 53/20/34\par Pressures:  -- Pulmonary Capillary Wedge: 17/15/13\par Pressures:  -- Right Atrium (a/v/M): 16/11/10\par Pressures:  -- Right Ventricle (s/edp): 56/11/--\par CO by Ranjan: 7.79\par Pulmonary vascular resistance (Wood Units): 2.69\par Total pulmonary resistance (Wood Units): 4.36\par \par \par 1/6/2022 pulm htn Group I started on sildenafil 20mg tid and diuretics, on continuous oxygen\par On eliquis since 7/2021 for VT\par She is c/o exertional dyspnea\par Has OLAMIDE - not yet received cpap

## 2022-01-10 ENCOUNTER — TRANSCRIPTION ENCOUNTER (OUTPATIENT)
Age: 57
End: 2022-01-10

## 2022-02-04 ENCOUNTER — APPOINTMENT (OUTPATIENT)
Dept: PULMONOLOGY | Facility: CLINIC | Age: 57
End: 2022-02-04
Payer: COMMERCIAL

## 2022-02-22 ENCOUNTER — APPOINTMENT (OUTPATIENT)
Dept: PULMONOLOGY | Facility: CLINIC | Age: 57
End: 2022-02-22
Payer: COMMERCIAL

## 2022-02-22 VITALS
DIASTOLIC BLOOD PRESSURE: 72 MMHG | WEIGHT: 293 LBS | HEIGHT: 62 IN | SYSTOLIC BLOOD PRESSURE: 152 MMHG | BODY MASS INDEX: 53.92 KG/M2 | TEMPERATURE: 98.6 F | HEART RATE: 113 BPM | RESPIRATION RATE: 20 BRPM

## 2022-02-22 PROCEDURE — 36415 COLL VENOUS BLD VENIPUNCTURE: CPT

## 2022-02-22 PROCEDURE — 99215 OFFICE O/P EST HI 40 MIN: CPT | Mod: 25

## 2022-02-22 PROCEDURE — 94618 PULMONARY STRESS TESTING: CPT

## 2022-02-22 PROCEDURE — ZZZZZ: CPT

## 2022-02-22 NOTE — PHYSICAL EXAM
[No Acute Distress] : no acute distress [Normal Oropharynx] : normal oropharynx [IV] : Mallampati Class: IV [Thyroid Not Enlarged] : thyroid not enlarged [Normal Rate/Rhythm] : normal rate/rhythm [No Abnormalities] : no abnormalities [Benign] : benign [Normal Gait] : normal gait [No Clubbing] : no clubbing [Normal Color/ Pigmentation] : normal color/ pigmentation [No Focal Deficits] : no focal deficits [Oriented x3] : oriented x3 [TextBox_54] : Loud P2 [TextBox_68] : Decreased entry at bases [TextBox_105] : Edema

## 2022-02-22 NOTE — ASSESSMENT
[FreeTextEntry1] : ---Assessment plan----------The patient has been referred here for further opinion regarding pulmonary problem,\par  \par Pt here for follow up pHTN/  PE and OLAMIDE. Pending CPAP delivery- some delay ^ recent recall. Continues on lasix for pHTN, supplemental O2 with sats at 88% on RA at rest. \par PT REPORTS  H/O PE IN 1995, 2010 AND 2012  ----VQ scan 7/2021 low prob for pE--- has pulmonary hypertension\par \par \par \par 1) pulmonary hypertension----- continue sildenafil and diuretics- keep euvolemic- may add opsumit down the line\par 2) medically necessary to use oxygen x 24hrs\par 3) olamide- needs cpap- will f/u on status of delivery, wt loss advised\par 4) will need pulm htn labs\par 5) pulmonary rehab\par 6 6-minute walk test\par 7 follow-up in 1 month's time \par \par \par \par Thanks for allowing  me to participate  in the care of this patient.  Patient at this time  will follow  the above mentioned recommendations and return back for follow up visit. If you have any questions  I can be reached  at # [phone] (office).\par \par Rc Byrne MD, FCCP \par Director, Pulmonary Hypertension Program \par [facility] \par Division of Pulmonary, Critical Care and Sleep Medicine \par  Professor of Medicine \par [employer]\par \par JAYDE Belle\par \par \par \par

## 2022-02-22 NOTE — HISTORY OF PRESENT ILLNESS
[TextBox_4] : Pt here for follow up pHTN/  PE and OLAMIDE. Pending CPAP delivery- some delay ^ recent recall. Continues on lasix for pHTN, supplemental O2 with sats at 88% on RA at rest. \par \par PT REPORTS  H/O PE IN 1995, 2010 AND 2012  ----VQ scan 7/2021 low prob for pE--- has pulmonary hypertension\par \par CTA chest 7/2021\par IMPRESSION:\par No main, right, left, or lobar pulmonary embolism. Limited evaluation of segmental and subsegmental pulmonary arteries.\par No pneumonia.\par \par VQ scan 7/2021 low prob for pE\par \par PFT 8/2021 moderate to severe restrictive lung disease\par \par HST w/TERESA 16.9- still has not received cpap\par \par echo 7/2021 \par CONCLUSIONS:\par Technically difficult study.\par 1. Normal mitral valve. Minimal mitral regurgitation.\par 2. Normal left ventricular internal dimensions and wall\par thicknesses.\par 3. Endocardium not well visualized; grossly normal left\par ventricular systolic function.  Endocardial visualization\par \par enhanced with intravenous injection of echo contrast\par (Definity).\par 4. Unable to accurately evaluate right ventricular size or\par systolic function.\par \par \par cardiac cath 7/2021 \par mean PA 30\par Aortic Pressure (S/D/M): 141/77/100\par Pressures:  -- Aortic Pressure (S/D/M): 53/20/34\par Pressures:  -- Pulmonary Capillary Wedge: 17/15/13\par Pressures:  -- Right Atrium (a/v/M): 16/11/10\par Pressures:  -- Right Ventricle (s/edp): 56/11/--\par CO by Ranjan: 7.79\par Pulmonary vascular resistance (Wood Units): 2.69\par Total pulmonary resistance (Wood Units): 4.36\par \par \par 1/6/2022 pulm htn Group I started on sildenafil 20mg tid and diuretics, on continuous oxygen\par On eliquis since 7/2021 for [place]\par She is c/o exertional dyspnea\par Has OLAMIDE - not yet received cpap\par \par 2/22/2022 pulm htn group1 on 20 mg TID sildenafil,  diuretics, continuously oxygen 3 LMP\par on eliquis since 7/2021 \par she is still experiencing exertional dyspnea \par AWAITING  CPAP ---- cpap okay

## 2022-02-22 NOTE — LETTER BODY
[FreeTextEntry1] : Please be advised that Ms. Flores has been under my care since August 2021 for treatment of pulmonary hypertension, obstructive sleep apnea, and pulmonary embolism. She remains limited to exercise and activity, still requiring supplemental oxygen to assist with her ADLs and IADLS. Her current plan of treatment is to treat her pulmonary hypertension and sleep apnea to improve her shortness of breath. She is anticipated to return to work in March 2022, [FreeTextEntry3] : Anaya Ludwig NP\par for \par Massimo Mathew MD

## 2022-02-25 DIAGNOSIS — Z01.812 ENCOUNTER FOR PREPROCEDURAL LABORATORY EXAMINATION: ICD-10-CM

## 2022-02-25 LAB
ALBUMIN SERPL ELPH-MCNC: 4.5 G/DL
ALP BLD-CCNC: 172 U/L
ALT SERPL-CCNC: 24 U/L
ANA SER IF-ACNC: NEGATIVE
ANION GAP SERPL CALC-SCNC: 12 MMOL/L
AST SERPL-CCNC: 22 U/L
BASOPHILS # BLD AUTO: 0.02 K/UL
BASOPHILS NFR BLD AUTO: 0.3 %
BILIRUB SERPL-MCNC: 0.3 MG/DL
BUN SERPL-MCNC: 12 MG/DL
CALCIUM SERPL-MCNC: 9.7 MG/DL
CHLORIDE SERPL-SCNC: 100 MMOL/L
CO2 SERPL-SCNC: 33 MMOL/L
CREAT SERPL-MCNC: 0.72 MG/DL
EOSINOPHIL # BLD AUTO: 0.13 K/UL
EOSINOPHIL NFR BLD AUTO: 2 %
GLUCOSE SERPL-MCNC: 124 MG/DL
HCT VFR BLD CALC: 45.1 %
HGB BLD-MCNC: 13.4 G/DL
IMM GRANULOCYTES NFR BLD AUTO: 0.3 %
LYMPHOCYTES # BLD AUTO: 1.35 K/UL
LYMPHOCYTES NFR BLD AUTO: 21.2 %
MAN DIFF?: NORMAL
MCHC RBC-ENTMCNC: 27.6 PG
MCHC RBC-ENTMCNC: 29.7 GM/DL
MCV RBC AUTO: 92.8 FL
MONOCYTES # BLD AUTO: 0.51 K/UL
MONOCYTES NFR BLD AUTO: 8 %
NEUTROPHILS # BLD AUTO: 4.33 K/UL
NEUTROPHILS NFR BLD AUTO: 68.2 %
PLATELET # BLD AUTO: 266 K/UL
POTASSIUM SERPL-SCNC: 3.8 MMOL/L
PROT SERPL-MCNC: 7 G/DL
RBC # BLD: 4.86 M/UL
RBC # FLD: 13.4 %
SODIUM SERPL-SCNC: 144 MMOL/L
TSH SERPL-ACNC: 2.37 UIU/ML
WBC # FLD AUTO: 6.36 K/UL

## 2022-03-03 ENCOUNTER — NON-APPOINTMENT (OUTPATIENT)
Age: 57
End: 2022-03-03

## 2022-03-07 ENCOUNTER — APPOINTMENT (OUTPATIENT)
Dept: NUCLEAR MEDICINE | Facility: HOSPITAL | Age: 57
End: 2022-03-07
Payer: COMMERCIAL

## 2022-03-07 ENCOUNTER — OUTPATIENT (OUTPATIENT)
Dept: OUTPATIENT SERVICES | Facility: HOSPITAL | Age: 57
LOS: 1 days | End: 2022-03-07

## 2022-03-07 ENCOUNTER — OUTPATIENT (OUTPATIENT)
Dept: OUTPATIENT SERVICES | Facility: HOSPITAL | Age: 57
LOS: 1 days | End: 2022-03-07
Payer: COMMERCIAL

## 2022-03-07 ENCOUNTER — APPOINTMENT (OUTPATIENT)
Dept: NUCLEAR MEDICINE | Facility: HOSPITAL | Age: 57
End: 2022-03-07

## 2022-03-07 ENCOUNTER — APPOINTMENT (OUTPATIENT)
Dept: RADIOLOGY | Facility: HOSPITAL | Age: 57
End: 2022-03-07

## 2022-03-07 DIAGNOSIS — Z98.891 HISTORY OF UTERINE SCAR FROM PREVIOUS SURGERY: Chronic | ICD-10-CM

## 2022-03-07 DIAGNOSIS — I26.99 OTHER PULMONARY EMBOLISM WITHOUT ACUTE COR PULMONALE: ICD-10-CM

## 2022-03-07 PROCEDURE — 78582 LUNG VENTILAT&PERFUS IMAGING: CPT | Mod: 26,GC

## 2022-03-07 PROCEDURE — 71046 X-RAY EXAM CHEST 2 VIEWS: CPT | Mod: 26

## 2022-03-08 ENCOUNTER — APPOINTMENT (OUTPATIENT)
Dept: PULMONOLOGY | Facility: CLINIC | Age: 57
End: 2022-03-08
Payer: COMMERCIAL

## 2022-03-08 ENCOUNTER — NON-APPOINTMENT (OUTPATIENT)
Age: 57
End: 2022-03-08

## 2022-03-08 VITALS
BODY MASS INDEX: 53.92 KG/M2 | HEART RATE: 109 BPM | WEIGHT: 293 LBS | HEIGHT: 62 IN | TEMPERATURE: 97.3 F | DIASTOLIC BLOOD PRESSURE: 72 MMHG | OXYGEN SATURATION: 97 % | SYSTOLIC BLOOD PRESSURE: 109 MMHG

## 2022-03-08 LAB
BASOPHILS # BLD AUTO: 0.02 K/UL
BASOPHILS NFR BLD AUTO: 0.3 %
EOSINOPHIL # BLD AUTO: 0.13 K/UL
EOSINOPHIL NFR BLD AUTO: 2.1 %
HCT VFR BLD CALC: 46.5 %
HGB BLD-MCNC: 13.7 G/DL
IMM GRANULOCYTES NFR BLD AUTO: 0.2 %
LYMPHOCYTES # BLD AUTO: 1.49 K/UL
LYMPHOCYTES NFR BLD AUTO: 24 %
MAN DIFF?: NORMAL
MCHC RBC-ENTMCNC: 27.7 PG
MCHC RBC-ENTMCNC: 29.5 GM/DL
MCV RBC AUTO: 94.1 FL
MONOCYTES # BLD AUTO: 0.58 K/UL
MONOCYTES NFR BLD AUTO: 9.3 %
NEUTROPHILS # BLD AUTO: 3.99 K/UL
NEUTROPHILS NFR BLD AUTO: 64.1 %
NT-PROBNP SERPL-MCNC: 35 PG/ML
PLATELET # BLD AUTO: 255 K/UL
RBC # BLD: 4.94 M/UL
RBC # FLD: 13.5 %
WBC # FLD AUTO: 6.22 K/UL

## 2022-03-08 PROCEDURE — 99215 OFFICE O/P EST HI 40 MIN: CPT | Mod: 25

## 2022-03-08 PROCEDURE — 36415 COLL VENOUS BLD VENIPUNCTURE: CPT

## 2022-03-08 NOTE — ASSESSMENT
[FreeTextEntry1] : ---Assessment plan----------The patient has been referred here for further opinion regarding pulmonary problem,\par  \par Pt here for follow up pHTN/  PE and OLAMIDE. Pending CPAP delivery- some delay ^ recent recall. Continues on lasix for pHTN, supplemental O2 with sats at 88% on RA at rest. \par PT REPORTS  H/O PE IN 1995, 2010 AND 2012  ----VQ scan 7/2021 low prob for pE--- has pulmonary hypertension\par \par \par \par 1) pulmonary hypertension----- continue sildenafil and diuretics- keep euvolemic- may add opsumit down the line\par 2) medically necessary to use oxygen x 24hrs\par 3) olamide- needs cpap- will f/u on status of delivery, wt loss advised---F/U THE COMPLIANCE REPORT \par 4) will need pulm htn labs\par 5) pulmonary rehab\par 6 ENDOCRINE OPINION \par 7 follow-up in 1 month's time \par \par \par \par Thanks for allowing  me to participate  in the care of this patient.  Patient at this time  will follow  the above mentioned recommendations and return back for follow up visit. If you have any questions  I can be reached  at # [phone] (office).\par \par Rc Byrne MD, FCCP \par Director, Pulmonary Hypertension Program \par [facility] \par Division of Pulmonary, Critical Care and Sleep Medicine \par  Professor of Medicine \par [employer]\par \par ROBBIE BelleC\par \par \par \par

## 2022-03-08 NOTE — HISTORY OF PRESENT ILLNESS
[TextBox_4] : Pt here for follow up pHTN/  PE and OLAMIDE. Pending CPAP delivery- some delay ^ recent recall. Continues on lasix for pHTN, supplemental O2 with sats at 88% on RA at rest. \par \par PT REPORTS  H/O PE IN 1995, 2010 AND 2012  ----VQ scan 7/2021 low prob for pE--- has pulmonary hypertension\par \par CTA chest 7/2021\par IMPRESSION:\par No main, right, left, or lobar pulmonary embolism. Limited evaluation of segmental and subsegmental pulmonary arteries.\par No pneumonia.\par \par VQ scan 7/2021 low prob for pE\par ----VQ scan 2022-- low prob for pE-\par \par PFT 8/2021 moderate to severe restrictive lung disease\par \par HST w/TERESA 16.9- still has not received cpap\par \par echo 7/2021 \par CONCLUSIONS:\par Technically difficult study.\par 1. Normal mitral valve. Minimal mitral regurgitation.\par 2. Normal left ventricular internal dimensions and wall\par thicknesses.\par 3. Endocardium not well visualized; grossly normal left\par ventricular systolic function.  Endocardial visualization\par \par enhanced with intravenous injection of echo contrast\par (Definity).\par 4. Unable to accurately evaluate right ventricular size or\par systolic function.\par \par \par cardiac cath 7/2021 \par mean PA 30\par Aortic Pressure (S/D/M): 141/77/100\par Pressures:  -- Aortic Pressure (S/D/M): 53/20/34\par Pressures:  -- Pulmonary Capillary Wedge: 17/15/13\par Pressures:  -- Right Atrium (a/v/M): 16/11/10\par Pressures:  -- Right Ventricle (s/edp): 56/11/--\par CO by Ranjan: 7.79\par Pulmonary vascular resistance (Wood Units): 2.69\par Total pulmonary resistance (Wood Units): 4.36\par \par \par 1/6/2022 pulm htn Group I started on sildenafil 20mg tid and diuretics, on continuous oxygen\par On eliquis since 7/2021 for [place]\par She is c/o exertional dyspnea\par Has OLAMIDE - not yet received cpap\par \par 2/22/2022 pulm htn group1 on 20 mg TID sildenafil,  diuretics, continuously oxygen 3 LMP\par on eliquis since 7/2021 \par she is still experiencing exertional dyspnea \par AWAITING  CPAP ---- cpap okay\par \par march 2022--------pulm htn group1 on 20 mg TID sildenafil,  diuretics, continuously oxygen 3 LMP\par on eliquis since 7/2021 \par she is still experiencing exertional dyspnea ---enouraged to partcipate i pulmonary rehab--WT LOSS ADVISED \par AWAITING  CPAP ---- cpap okay-----AGREE WITH HER REQUEST FRO DISABILITY

## 2022-03-09 ENCOUNTER — APPOINTMENT (OUTPATIENT)
Dept: NUCLEAR MEDICINE | Facility: HOSPITAL | Age: 57
End: 2022-03-09

## 2022-03-09 LAB
ALBUMIN SERPL ELPH-MCNC: 4.6 G/DL
ALP BLD-CCNC: 186 U/L
ALT SERPL-CCNC: 25 U/L
ANION GAP SERPL CALC-SCNC: 13 MMOL/L
AST SERPL-CCNC: 20 U/L
BILIRUB SERPL-MCNC: 0.4 MG/DL
BUN SERPL-MCNC: 8 MG/DL
CALCIUM SERPL-MCNC: 9.8 MG/DL
CHLORIDE SERPL-SCNC: 100 MMOL/L
CO2 SERPL-SCNC: 33 MMOL/L
CREAT SERPL-MCNC: 0.73 MG/DL
EGFR: 98 ML/MIN/1.73M2
GLUCOSE SERPL-MCNC: 98 MG/DL
POTASSIUM SERPL-SCNC: 4.1 MMOL/L
PROT SERPL-MCNC: 7.3 G/DL
SODIUM SERPL-SCNC: 146 MMOL/L

## 2022-03-16 ENCOUNTER — NON-APPOINTMENT (OUTPATIENT)
Age: 57
End: 2022-03-16

## 2022-04-05 ENCOUNTER — APPOINTMENT (OUTPATIENT)
Dept: PULMONOLOGY | Facility: CLINIC | Age: 57
End: 2022-04-05
Payer: COMMERCIAL

## 2022-04-05 VITALS
HEART RATE: 107 BPM | WEIGHT: 293 LBS | DIASTOLIC BLOOD PRESSURE: 78 MMHG | TEMPERATURE: 97.8 F | BODY MASS INDEX: 53.92 KG/M2 | SYSTOLIC BLOOD PRESSURE: 116 MMHG | RESPIRATION RATE: 20 BRPM | HEIGHT: 62 IN

## 2022-04-05 PROCEDURE — 36415 COLL VENOUS BLD VENIPUNCTURE: CPT

## 2022-04-05 PROCEDURE — 99215 OFFICE O/P EST HI 40 MIN: CPT | Mod: 25

## 2022-04-05 NOTE — ASSESSMENT
[FreeTextEntry1] : ---Assessment plan----------The patient has been referred here for further opinion regarding pulmonary problem,\par  \par Pt here for follow up pHTN/  PE and OLAMIDE. Pending CPAP delivery- some delay ^ recent recall. Continues on lasix for pHTN, supplemental O2 with sats at 88% on RA at rest. \par PT REPORTS  H/O PE IN 1995, 2010 AND 2012  ----VQ scan 7/2021 low prob for pE--- has pulmonary hypertension\par \par \par \par 1) pulmonary hypertension----- continue sildenafil and diuretics  LASIX AND SPIROLACTONE---- keep euvolemic- may add opsumit down the line\par 2) medically necessary to use oxygen x 24hrs\par 3) olamide- needs cpap- will f/u on status of delivery, wt loss advised--- she has not received the CPAP machine will follow up with the homecare company\par 4) will need pulm htn labs\par 5) pulmonary rehab\par 6 ENDOCRINE OPINION \par 7 follow-up in 1 month's time \par \par \par \par Thanks for allowing  me to participate  in the care of this patient.  Patient at this time  will follow  the above mentioned recommendations and return back for follow up visit. If you have any questions  I can be reached  at # [phone] (office).\par \par Rc Byrne MD, FCCP \par Director, Pulmonary Hypertension Program \par [facility] \par Division of Pulmonary, Critical Care and Sleep Medicine \par  Professor of Medicine \par [employer]\par \par ROBBIE BelleC\par \par \par \par

## 2022-04-05 NOTE — HISTORY OF PRESENT ILLNESS
[TextBox_4] : Pt here for follow up pHTN/  PE and OALMIDE. Pending CPAP delivery- some delay ^ recent recall. Continues on lasix for pHTN, supplemental O2 with sats at 88% on RA at rest. \par \par PT REPORTS  H/O PE IN 1995, 2010 AND 2012  ----VQ scan 7/2021 low prob for pE--- has pulmonary hypertension\par \par CTA chest 7/2021\par IMPRESSION:\par No main, right, left, or lobar pulmonary embolism. Limited evaluation of segmental and subsegmental pulmonary arteries.\par No pneumonia.\par \par VQ scan 7/2021 low prob for pE\par ----VQ scan 2022-- low prob for pE-\par \par PFT 8/2021 moderate to severe restrictive lung disease\par \par HST w/TERESA 16.9- still has not received cpap\par \par echo 7/2021 \par CONCLUSIONS:\par Technically difficult study.\par 1. Normal mitral valve. Minimal mitral regurgitation.\par 2. Normal left ventricular internal dimensions and wall\par thicknesses.\par 3. Endocardium not well visualized; grossly normal left\par ventricular systolic function.  Endocardial visualization\par \par enhanced with intravenous injection of echo contrast\par (Definity).\par 4. Unable to accurately evaluate right ventricular size or\par systolic function.\par \par \par cardiac cath 7/2021 \par mean PA 30\par Aortic Pressure (S/D/M): 141/77/100\par Pressures:  -- Aortic Pressure (S/D/M): 53/20/34\par Pressures:  -- Pulmonary Capillary Wedge: 17/15/13\par Pressures:  -- Right Atrium (a/v/M): 16/11/10\par Pressures:  -- Right Ventricle (s/edp): 56/11/--\par CO by Ranjan: 7.79\par Pulmonary vascular resistance (Wood Units): 2.69\par Total pulmonary resistance (Wood Units): 4.36\par \par \par 1/6/2022 pulm htn Group I started on sildenafil 20mg tid and diuretics, on continuous oxygen\par On eliquis since 7/2021 for [place]\par She is c/o exertional dyspnea\par Has OLAMIDE - not yet received cpap\par \par 2/22/2022 pulm htn group1 on 20 mg TID sildenafil,  diuretics, continuously oxygen 3 LMP\par on eliquis since 7/2021 \par she is still experiencing exertional dyspnea \par AWAITING  CPAP ---- cpap okay\par \par march 2022--------pulm htn group1 on 20 mg TID sildenafil,  diuretics, continuously oxygen 3 LMP\par on eliquis since 7/2021 \par she is still experiencing exertional dyspnea ---enouraged to partcipate i pulmonary rehab--WT LOSS ADVISED \par AWAITING  CPAP ---- cpap okay-----AGREE WITH HER REQUEST FRO DISABILITY\par \par 4/2022 pulm htn treated w/sildenafil 20mg tid- stable from resp perspective, on continuous oxygen and diuretics--- on Lasix will add SPIROLACTONE------  PHTN CONTD   sildenafil\par OSA_ unable to received cpap d/t coverage issue

## 2022-04-06 LAB
ALBUMIN SERPL ELPH-MCNC: 4.5 G/DL
ALP BLD-CCNC: 183 U/L
ALT SERPL-CCNC: 24 U/L
ANION GAP SERPL CALC-SCNC: 15 MMOL/L
AST SERPL-CCNC: 16 U/L
BASOPHILS # BLD AUTO: 0.02 K/UL
BASOPHILS NFR BLD AUTO: 0.3 %
BILIRUB SERPL-MCNC: 0.2 MG/DL
BUN SERPL-MCNC: 8 MG/DL
CALCIUM SERPL-MCNC: 9.9 MG/DL
CHLORIDE SERPL-SCNC: 99 MMOL/L
CO2 SERPL-SCNC: 30 MMOL/L
CREAT SERPL-MCNC: 0.74 MG/DL
EGFR: 96 ML/MIN/1.73M2
EOSINOPHIL # BLD AUTO: 0.1 K/UL
EOSINOPHIL NFR BLD AUTO: 1.7 %
ESTIMATED AVERAGE GLUCOSE: 131 MG/DL
GH SERPL-MCNC: 0.43 NG/ML
GH SERPL-MCNC: 0.44 NG/ML
GLUCOSE SERPL-MCNC: 95 MG/DL
HBA1C MFR BLD HPLC: 6.2 %
HCT VFR BLD CALC: 47.9 %
HGB BLD-MCNC: 14.2 G/DL
IMM GRANULOCYTES NFR BLD AUTO: 0.2 %
LYMPHOCYTES # BLD AUTO: 1.47 K/UL
LYMPHOCYTES NFR BLD AUTO: 24.3 %
MAN DIFF?: NORMAL
MCHC RBC-ENTMCNC: 27.7 PG
MCHC RBC-ENTMCNC: 29.6 GM/DL
MCV RBC AUTO: 93.6 FL
MONOCYTES # BLD AUTO: 0.48 K/UL
MONOCYTES NFR BLD AUTO: 7.9 %
NEUTROPHILS # BLD AUTO: 3.97 K/UL
NEUTROPHILS NFR BLD AUTO: 65.6 %
PLATELET # BLD AUTO: 255 K/UL
POTASSIUM SERPL-SCNC: 3.9 MMOL/L
PROT SERPL-MCNC: 7.3 G/DL
RBC # BLD: 5.12 M/UL
RBC # FLD: 13.4 %
SODIUM SERPL-SCNC: 144 MMOL/L
THYROGLOB AB SERPL-ACNC: <20 IU/ML
THYROPEROXIDASE AB SERPL IA-ACNC: <10 IU/ML
WBC # FLD AUTO: 6.05 K/UL

## 2022-04-07 LAB
THYROGLOB AB SERPL-ACNC: <20 IU/ML
THYROPEROXIDASE AB SERPL IA-ACNC: <10 IU/ML

## 2022-04-08 LAB
ACE BLD-CCNC: 29 U/L
IGF BP1 SERPL-MCNC: 118 NG/ML

## 2022-04-11 ENCOUNTER — APPOINTMENT (OUTPATIENT)
Dept: CARDIOLOGY | Facility: CLINIC | Age: 57
End: 2022-04-11
Payer: COMMERCIAL

## 2022-04-11 ENCOUNTER — NON-APPOINTMENT (OUTPATIENT)
Age: 57
End: 2022-04-11

## 2022-04-11 VITALS
OXYGEN SATURATION: 96 % | SYSTOLIC BLOOD PRESSURE: 128 MMHG | HEART RATE: 114 BPM | BODY MASS INDEX: 58.53 KG/M2 | WEIGHT: 293 LBS | DIASTOLIC BLOOD PRESSURE: 88 MMHG

## 2022-04-11 DIAGNOSIS — Z78.9 OTHER SPECIFIED HEALTH STATUS: ICD-10-CM

## 2022-04-11 PROCEDURE — 99215 OFFICE O/P EST HI 40 MIN: CPT

## 2022-04-11 PROCEDURE — 93000 ELECTROCARDIOGRAM COMPLETE: CPT

## 2022-04-11 NOTE — REASON FOR VISIT
[CV Risk Factors and Non-Cardiac Disease] : CV risk factors and non-cardiac disease [Other: ____] : [unfilled] [Other: _____] : [unfilled]

## 2022-04-15 NOTE — CARDIOLOGY SUMMARY
[de-identified] : 10/22/2021, sinus tachycardia with poor R-wave progression [de-identified] : 7/19/2021 RHC with Beau Shepherd MD at [facility], PCWP 13 mmHg, RA 10 mmHg, RV 56/11 mmHg, Ao 141/77 mmHg, CO 7.8 L/min by Ranjan

## 2022-04-15 NOTE — HISTORY OF PRESENT ILLNESS
[FreeTextEntry1] : Patient is a 56 year-old Black woman with known past medical history of hypertension, PE on anticoagulation, pulmonary hypertension, OLAMIDE, and morbid obesity.\par Admitted to [facility] for shortness of breath in July 2021. She had a right heart catheterization showing minimally elevated pulmonary capillary wedge pressure with elevated RVSP and RA pressures. She is now maintained on 3 L/min via nasal cannula 24 hours a day. She has been started on furosemide 40 mg BID and spironolactone that is ordered for 25 mg three days per week. \par Her daughter is a nutritionist and has been preparing her meals. She has lost almost 30 pounds in the past month. \par \par She has been trying to exercise more recently, including walking for 10 minutes at a time.

## 2022-04-19 ENCOUNTER — APPOINTMENT (OUTPATIENT)
Dept: ULTRASOUND IMAGING | Facility: CLINIC | Age: 57
End: 2022-04-19
Payer: COMMERCIAL

## 2022-04-19 ENCOUNTER — OUTPATIENT (OUTPATIENT)
Dept: OUTPATIENT SERVICES | Facility: HOSPITAL | Age: 57
LOS: 1 days | End: 2022-04-19
Payer: COMMERCIAL

## 2022-04-19 DIAGNOSIS — Z98.891 HISTORY OF UTERINE SCAR FROM PREVIOUS SURGERY: Chronic | ICD-10-CM

## 2022-04-19 DIAGNOSIS — I27.20 PULMONARY HYPERTENSION, UNSPECIFIED: ICD-10-CM

## 2022-04-19 DIAGNOSIS — Z00.8 ENCOUNTER FOR OTHER GENERAL EXAMINATION: ICD-10-CM

## 2022-04-19 PROCEDURE — 93970 EXTREMITY STUDY: CPT | Mod: 26

## 2022-04-19 PROCEDURE — 93970 EXTREMITY STUDY: CPT

## 2022-04-22 ENCOUNTER — APPOINTMENT (OUTPATIENT)
Dept: PULMONOLOGY | Facility: CLINIC | Age: 57
End: 2022-04-22
Payer: COMMERCIAL

## 2022-04-22 PROCEDURE — 94618 PULMONARY STRESS TESTING: CPT

## 2022-04-22 PROCEDURE — 94010 BREATHING CAPACITY TEST: CPT

## 2022-04-22 PROCEDURE — 94726 PLETHYSMOGRAPHY LUNG VOLUMES: CPT

## 2022-04-22 PROCEDURE — 94729 DIFFUSING CAPACITY: CPT

## 2022-05-22 ENCOUNTER — NON-APPOINTMENT (OUTPATIENT)
Age: 57
End: 2022-05-22

## 2022-05-26 ENCOUNTER — APPOINTMENT (OUTPATIENT)
Dept: PULMONOLOGY | Facility: CLINIC | Age: 57
End: 2022-05-26
Payer: COMMERCIAL

## 2022-05-26 VITALS
HEART RATE: 103 BPM | OXYGEN SATURATION: 97 % | SYSTOLIC BLOOD PRESSURE: 136 MMHG | HEIGHT: 62 IN | TEMPERATURE: 97.3 F | BODY MASS INDEX: 53.92 KG/M2 | DIASTOLIC BLOOD PRESSURE: 83 MMHG | WEIGHT: 293 LBS

## 2022-05-26 PROCEDURE — 99215 OFFICE O/P EST HI 40 MIN: CPT

## 2022-05-26 RX ORDER — LOSARTAN POTASSIUM 100 MG/1
100 TABLET, FILM COATED ORAL DAILY
Qty: 30 | Refills: 5 | Status: ACTIVE | COMMUNITY
Start: 2021-05-26

## 2022-05-26 NOTE — ASSESSMENT
[FreeTextEntry1] : ---Assessment plan----------The patient has been referred here for further opinion regarding pulmonary problem,\par  \par Pt here for follow up pHTN/  PE and OLAMIDE. Pending CPAP delivery- some delay ^ recent recall. Continues on lasix for pHTN, supplemental O2 with sats at 88% on RA at rest. \par PT REPORTS  H/O PE IN 1995, 2010 AND 2012  ----VQ scan 7/2021 low prob for pE--- has pulmonary hypertension\par \par \par \par 1) pulmonary hypertension----- continue sildenafil 20mg TID, Lasix 40mg daily and Aldactone 25 TIW. Keep euvolemic- may add opsumit down the line or change to [place]\par 2) medically necessary to use oxygen x 24hrs\par 3) olamide- needs cpap- will f/u on status of delivery, wt loss advised--- she has not received the CPAP machine will follow up with the homecare company\par 4) will need pulm htn labs\par 5) pulmonary rehab\par 6 ENDOCRINE OPINION \par 7 follow-up in 1 month's time \par \par \par \par Thanks for allowing  me to participate  in the care of this patient.  Patient at this time  will follow  the above mentioned recommendations and return back for follow up visit. If you have any questions  I can be reached  at # [phone] (office).\par \par Rc Byrne MD, FCCP \par Director, Pulmonary Hypertension Program \par [facility] [place] \par Division of Pulmonary, Critical Care and Sleep Medicine \par  Professor of Medicine \par [employer]\par \par JAYDE Belle\par \par \par \par

## 2022-05-26 NOTE — HISTORY OF PRESENT ILLNESS
[TextBox_4] : Pt here for follow up pHTN/  PE and OLAMIDE. Pending CPAP delivery- some delay ^ recent recall. Continues on lasix for pHTN, supplemental O2 with sats at 88% on RA at rest. \par \par PT REPORTS  H/O PE IN 1995, 2010 AND 2012  ----VQ scan 7/2021 low prob for pE--- has pulmonary hypertension\par \par CTA chest 7/2021\par IMPRESSION:\par No main, right, left, or lobar pulmonary embolism. Limited evaluation of segmental and subsegmental pulmonary arteries.\par No pneumonia.\par \par VQ scan 7/2021 low prob for pE\par ----VQ scan 2022-- low prob for pE-\par \par PFT 8/2021 moderate to severe restrictive lung disease\par \par HST w/TERESA 16.9- still has not received cpap\par \par echo 7/2021 \par CONCLUSIONS:\par Technically difficult study.\par 1. Normal mitral valve. Minimal mitral regurgitation.\par 2. Normal left ventricular internal dimensions and wall\par thicknesses.\par 3. Endocardium not well visualized; grossly normal left\par ventricular systolic function.  Endocardial visualization\par \par enhanced with intravenous injection of echo contrast\par (Definity).\par 4. Unable to accurately evaluate right ventricular size or\par systolic function.\par \par \par cardiac cath 7/2021 \par mean PA 30\par Aortic Pressure (S/D/M): 141/77/100\par Pressures:  -- Aortic Pressure (S/D/M): 53/20/34\par Pressures:  -- Pulmonary Capillary Wedge: 17/15/13\par Pressures:  -- Right Atrium (a/v/M): 16/11/10\par Pressures:  -- Right Ventricle (s/edp): 56/11/--\par CO by Ranjan: 7.79\par Pulmonary vascular resistance (Wood Units): 2.69\par Total pulmonary resistance (Wood Units): 4.36\par \par \par 1/6/2022 pulm htn Group I started on sildenafil 20mg tid and diuretics, on continuous oxygen\par On eliquis since 7/2021 for [place]\par She is c/o exertional dyspnea\par Has OLAMIDE - not yet received cpap\par \par 2/22/2022 pulm htn group1 on 20 mg TID sildenafil,  diuretics, continuously oxygen 3 LMP\par on eliquis since 7/2021 \par she is still experiencing exertional dyspnea \par AWAITING  CPAP ---- cpap okay\par \par march 2022--------pulm htn group1 on 20 mg TID sildenafil,  diuretics, continuously oxygen 3 LMP\par on eliquis since 7/2021 \par she is still experiencing exertional dyspnea ---enouraged to partcipate i pulmonary rehab--WT LOSS ADVISED \par AWAITING  CPAP ---- cpap okay-----AGREE WITH HER REQUEST FRO DISABILITY\par \par 4/2022 pulm htn treated w/sildenafil 20mg tid- stable from resp perspective, on continuous oxygen and diuretics--- on Lasix will add SPIROLACTONE------  PHTN CONTD   sildenafil\par OSA_ unable to received cpap d/t coverage issue\par \par 5/2022 Feeling better than last visit with Lasix and Aldactone. Compliant with medications including Sildenafil 20mg TID, Lasix 40mg PO daily, and Aldactone 25mg TIW. Denies any fevers, chills. Currently on 3L nc O2. Endorses ET of at least 300 feet. Still unable to get her CPAP due to insurance issues.

## 2022-06-03 LAB
ALBUMIN SERPL ELPH-MCNC: 4.3 G/DL
ALP BLD-CCNC: 172 U/L
ALT SERPL-CCNC: 24 U/L
ANION GAP SERPL CALC-SCNC: 13 MMOL/L
AST SERPL-CCNC: 17 U/L
BASOPHILS # BLD AUTO: 0.02 K/UL
BASOPHILS NFR BLD AUTO: 0.3 %
BILIRUB SERPL-MCNC: 0.4 MG/DL
BUN SERPL-MCNC: 6 MG/DL
CALCIUM SERPL-MCNC: 9.6 MG/DL
CHLORIDE SERPL-SCNC: 100 MMOL/L
CO2 SERPL-SCNC: 32 MMOL/L
CREAT SERPL-MCNC: 0.73 MG/DL
EGFR: 98 ML/MIN/1.73M2
EOSINOPHIL # BLD AUTO: 0.15 K/UL
EOSINOPHIL NFR BLD AUTO: 2.5 %
GLUCOSE SERPL-MCNC: 114 MG/DL
HCT VFR BLD CALC: 46.9 %
HGB BLD-MCNC: 14 G/DL
IMM GRANULOCYTES NFR BLD AUTO: 0.2 %
LYMPHOCYTES # BLD AUTO: 1.31 K/UL
LYMPHOCYTES NFR BLD AUTO: 22.1 %
MAN DIFF?: NORMAL
MCHC RBC-ENTMCNC: 28.1 PG
MCHC RBC-ENTMCNC: 29.9 GM/DL
MCV RBC AUTO: 94.2 FL
MONOCYTES # BLD AUTO: 0.41 K/UL
MONOCYTES NFR BLD AUTO: 6.9 %
NEUTROPHILS # BLD AUTO: 4.04 K/UL
NEUTROPHILS NFR BLD AUTO: 68 %
NT-PROBNP SERPL-MCNC: 31 PG/ML
PLATELET # BLD AUTO: 248 K/UL
POTASSIUM SERPL-SCNC: 3.9 MMOL/L
PROT SERPL-MCNC: 6.9 G/DL
RBC # BLD: 4.98 M/UL
RBC # FLD: 13.7 %
SODIUM SERPL-SCNC: 145 MMOL/L
WBC # FLD AUTO: 5.94 K/UL

## 2022-06-06 ENCOUNTER — APPOINTMENT (OUTPATIENT)
Dept: PULMONOLOGY | Facility: CLINIC | Age: 57
End: 2022-06-06

## 2022-06-13 ENCOUNTER — APPOINTMENT (OUTPATIENT)
Dept: PULMONOLOGY | Facility: CLINIC | Age: 57
End: 2022-06-13

## 2022-06-16 ENCOUNTER — APPOINTMENT (OUTPATIENT)
Dept: CARDIOLOGY | Facility: CLINIC | Age: 57
End: 2022-06-16
Payer: COMMERCIAL

## 2022-06-16 ENCOUNTER — NON-APPOINTMENT (OUTPATIENT)
Age: 57
End: 2022-06-16

## 2022-06-16 VITALS
HEART RATE: 103 BPM | SYSTOLIC BLOOD PRESSURE: 134 MMHG | BODY MASS INDEX: 53.92 KG/M2 | OXYGEN SATURATION: 94 % | DIASTOLIC BLOOD PRESSURE: 80 MMHG | WEIGHT: 293 LBS | HEIGHT: 62 IN

## 2022-06-16 PROCEDURE — 99214 OFFICE O/P EST MOD 30 MIN: CPT

## 2022-06-16 PROCEDURE — 93000 ELECTROCARDIOGRAM COMPLETE: CPT

## 2022-06-20 ENCOUNTER — APPOINTMENT (OUTPATIENT)
Dept: PULMONOLOGY | Facility: CLINIC | Age: 57
End: 2022-06-20
Payer: COMMERCIAL

## 2022-06-20 DIAGNOSIS — Z20.822 CONTACT WITH AND (SUSPECTED) EXPOSURE TO COVID-19: ICD-10-CM

## 2022-06-20 LAB — SARS-COV-2 AG RESP QL IA.RAPID: NEGATIVE

## 2022-06-20 PROCEDURE — 87811 SARS-COV-2 COVID19 W/OPTIC: CPT

## 2022-06-20 PROCEDURE — 94626 PHY/QHP OP PULM RHB W/MNTR: CPT

## 2022-06-23 NOTE — HISTORY OF PRESENT ILLNESS
[FreeTextEntry1] : Patient is a 56 year-old Black woman with known past medical history of hypertension, PE on anticoagulation, pulmonary hypertension, OLAMIDE, and morbid obesity.\par Admitted to [facility] for shortness of breath in July 2021. She had a right heart catheterization showing minimally elevated pulmonary capillary wedge pressure with elevated RVSP and RA pressures. She is now maintained on 3 L/min via nasal cannula 24 hours a day. She has been started on furosemide 40 mg BID and spironolactone that is ordered for 25 mg three days per week. \par Her daughter is a nutritionist and has been preparing her meals. She has lost almost 30 pounds in the past month. \par \par She has been trying to exercise more recently, including walking for 10 minutes at a time.

## 2022-06-23 NOTE — DISCUSSION/SUMMARY
[FreeTextEntry1] : Patient is a 56 year-old woman with pulmonary hypertension, likely multifactorial, managed by Rc Byrne MD.\par Supplemental oxygen x24 hours per day.\par \par Continue diet and exercise with the goal of ongoing weight loss.\par \par There is no cardiac contraindication to proceeding with rehab.

## 2022-06-23 NOTE — CARDIOLOGY SUMMARY
[de-identified] : 10/22/2021, sinus tachycardia with poor R-wave progression [de-identified] : 7/19/2021 RHC with Beau Shepherd MD at [facility], PCWP 13 mmHg, RA 10 mmHg, RV 56/11 mmHg, Ao 141/77 mmHg, CO 7.8 L/min by Ranjan

## 2022-06-27 ENCOUNTER — APPOINTMENT (OUTPATIENT)
Dept: PULMONOLOGY | Facility: CLINIC | Age: 57
End: 2022-06-27

## 2022-07-05 ENCOUNTER — APPOINTMENT (OUTPATIENT)
Dept: ENDOCRINOLOGY | Facility: CLINIC | Age: 57
End: 2022-07-05

## 2022-07-21 ENCOUNTER — APPOINTMENT (OUTPATIENT)
Dept: PULMONOLOGY | Facility: CLINIC | Age: 57
End: 2022-07-21

## 2022-07-21 PROCEDURE — 99443: CPT

## 2022-07-21 NOTE — REASON FOR VISIT
[Follow-Up] : a follow-up visit [Pulmonary Embolism] : pulmonary embolism [Pulmonary Hypertension] : pulmonary hypertension [Home] : at home, [unfilled] , at the time of the visit. [Medical Office: ([facility])___] : at the medical office located in  [Verbal consent obtained from patient] : the patient, [unfilled]

## 2022-07-21 NOTE — PHYSICAL EXAM
[Thyroid Not Enlarged] : thyroid not enlarged [Normal Rate/Rhythm] : normal rate/rhythm [TextBox_68] : Decreased entry at bases

## 2022-07-21 NOTE — ASSESSMENT
[FreeTextEntry1] : ---Assessment plan----------The patient has been referred here for further opinion regarding pulmonary problem,\par  \par Pt here for follow up pHTN/  PE and OLAMIDE. Pending CPAP delivery- some delay ^ recent recall. Continues on lasix for pHTN, supplemental O2 with sats at 88% on RA at rest. \par PT REPORTS  H/O PE IN 1995, 2010 AND 2012  ----VQ scan 7/2021 low prob for pE--- has pulmonary hypertension\par \par \par \par 1) pulmonary hypertension----- continue sildenafil 20mg TID, Lasix 40mg daily and Aldactone 25mg 5x increase to  weekly Keep euvolemic- may add opsumit down the line- repeat echo with Dr Guillory\par 2) medically necessary to use oxygen x 24hrs\par 3) olamide- needs cpap- await DME coverage wt loss advised--in meantime- will use nocturnal o2\par 4) will order home labs\par 5) needs pulmonary rehab\par 6) Per pt- PMD refused to give pt endocrine referral for obesity/wt loss- will try referral to Dr Moise in wt management\par 7) f/u in sept w/6mwt\par \par \par Thanks for allowing  me to participate  in the care of this patient.  Patient at this time  will follow  the above mentioned recommendations and return back for follow up visit. If you have any questions  I can be reached  at # [phone] (office).\par \par BEATRICE Belle-C\par for\par Rc Byrne MD, FCCP \par Director, Pulmonary Hypertension Program \par [facility] \par Division of Pulmonary, Critical Care and Sleep Medicine \par  Professor of Medicine \par [employer]\par \par \par \par \par \par

## 2022-07-21 NOTE — HISTORY OF PRESENT ILLNESS
[TextBox_4] : Pt here for follow up pHTN/  PE and OLAMIDE. Pending CPAP delivery- some delay ^ recent recall. Continues on lasix for pHTN, supplemental O2 with sats at 88% on RA at rest. \par \par PT REPORTS  H/O PE IN 1995, 2010 AND 2012  ----VQ scan 7/2021 low prob for pE--- has pulmonary hypertension\par \par CTA chest 7/2021\par IMPRESSION:\par No main, right, left, or lobar pulmonary embolism. Limited evaluation of segmental and subsegmental pulmonary arteries.\par No pneumonia.\par \par VQ scan 7/2021 low prob for pE\par ----VQ scan 2022-- low prob for pE-\par \par PFT 8/2021 moderate to severe restrictive lung disease\par \par HST w/TERESA 16.9- still has not received cpap\par \par echo 7/2021 \par CONCLUSIONS:\par Technically difficult study.\par 1. Normal mitral valve. Minimal mitral regurgitation.\par 2. Normal left ventricular internal dimensions and wall\par thicknesses.\par 3. Endocardium not well visualized; grossly normal left\par ventricular systolic function.  Endocardial visualization\par \par enhanced with intravenous injection of echo contrast\par (Definity).\par 4. Unable to accurately evaluate right ventricular size or\par systolic function.\par \par \par cardiac cath 7/2021 \par mean PA 30\par Aortic Pressure (S/D/M): 141/77/100\par Pressures:  -- Aortic Pressure (S/D/M): 53/20/34\par Pressures:  -- Pulmonary Capillary Wedge: 17/15/13\par Pressures:  -- Right Atrium (a/v/M): 16/11/10\par Pressures:  -- Right Ventricle (s/edp): 56/11/--\par CO by Ranjan: 7.79\par Pulmonary vascular resistance (Wood Units): 2.69\par Total pulmonary resistance (Wood Units): 4.36\par \par \par 1/6/2022 pulm htn Group I started on sildenafil 20mg tid and diuretics, on continuous oxygen\par On eliquis since 7[place] for [place]\par She is c/o exertional dyspnea\par Has OLAMIDE - not yet received cpap\par \par 2/22/2022 pulm htn group1 on 20 mg TID sildenafil,  diuretics, continuously oxygen 3 LMP\par on eliquis since 7/2021 \par she is still experiencing exertional dyspnea \par AWAITING  CPAP ---- cpap okay\par \par march 2022--------pulm htn group1 on 20 mg TID sildenafil,  diuretics, continuously oxygen 3 LMP\par on eliquis since 7/2021 \par she is still experiencing exertional dyspnea ---enouraged to partcipate i pulmonary rehab--WT LOSS ADVISED \par AWAITING  CPAP ---- cpap okay-----AGREE WITH HER REQUEST FRO DISABILITY\par \par 4/2022 pulm htn treated w/sildenafil 20mg tid- stable from resp perspective, on continuous oxygen and diuretics--- on Lasix will add SPIROLACTONE------  PHTN CONTD   sildenafil\par OSA_ unable to received cpap d/t coverage issue\par \par 5/2022 Feeling better than last visit with Lasix and Aldactone. Compliant with medications including Sildenafil 20mg TID, Lasix 40mg PO daily, and Aldactone 25mg TIW. Denies any fevers, chills. Currently on 3L nc O2. Endorses ET of at least 300 feet. Still unable to get her CPAP due to insurance issues. \par \par \par \par 7/2022 TTM with pt- unable to come in d/t transportation issues\par Pulm htn- on sildenafil 20mg tid, lasix 40mg bid, and spironolactone 25mg 3x weekly- having pedal edema. uses oxygen 3lpm x 24hrs, o2 sat drops to 88% room air rest.\par s/p cardiology appt with Dr Guillory- has f/u again in Sept\par OLAMIDE- not on cpap d/t lack of DME coverage- anticipates new coverage 12/2022

## 2022-07-26 ENCOUNTER — LABORATORY RESULT (OUTPATIENT)
Age: 57
End: 2022-07-26

## 2022-09-15 ENCOUNTER — APPOINTMENT (OUTPATIENT)
Dept: CARDIOLOGY | Facility: CLINIC | Age: 57
End: 2022-09-15

## 2022-09-15 ENCOUNTER — NON-APPOINTMENT (OUTPATIENT)
Age: 57
End: 2022-09-15

## 2022-09-15 VITALS
OXYGEN SATURATION: 97 % | DIASTOLIC BLOOD PRESSURE: 79 MMHG | WEIGHT: 293 LBS | BODY MASS INDEX: 53.92 KG/M2 | RESPIRATION RATE: 16 BRPM | HEART RATE: 111 BPM | SYSTOLIC BLOOD PRESSURE: 120 MMHG | HEIGHT: 62 IN

## 2022-09-15 PROCEDURE — 99214 OFFICE O/P EST MOD 30 MIN: CPT | Mod: 25

## 2022-09-15 PROCEDURE — 93000 ELECTROCARDIOGRAM COMPLETE: CPT

## 2022-09-19 NOTE — HISTORY OF PRESENT ILLNESS
[FreeTextEntry1] : Patient is a 57 year-old Black woman with known past medical history of hypertension, PE on anticoagulation, pulmonary hypertension, OLAMIDE, and morbid obesity.\par Admitted to [facility] for shortness of breath in July 2021. She had a right heart catheterization showing minimally elevated pulmonary capillary wedge pressure with elevated RVSP and RA pressures. She is now maintained on 3 L/min via nasal cannula 24 hours a day. She has been started on furosemide 40 mg BID and spironolactone that is ordered for 25 mg three days per week. \par Her daughter is a nutritionist and has been preparing her meals. She has lost almost 30 pounds in the past month. \par \par She has been trying to exercise more recently, including walking for 10 minutes at a time.

## 2022-09-19 NOTE — DISCUSSION/SUMMARY
[EKG obtained to assist in diagnosis and management of assessed problem(s)] : EKG obtained to assist in diagnosis and management of assessed problem(s) [FreeTextEntry1] : Patient is a 57 year-old woman with pulmonary hypertension, likely multifactorial, managed by Rc Byrne MD.\par Supplemental oxygen x24 hours per day.\par \par Continue diet and exercise with the goal of ongoing weight loss.\par \par There is no cardiac contraindication to proceeding with rehab.

## 2022-09-19 NOTE — CARDIOLOGY SUMMARY
[de-identified] : 10/22/2021, sinus tachycardia with poor R-wave progression [de-identified] : 7/19/2021 RHC with Beau Shepherd MD at [facility], PCWP 13 mmHg, RA 10 mmHg, RV 56/11 mmHg, Ao 141/77 mmHg, CO 7.8 L/min by Ranjan

## 2022-09-22 ENCOUNTER — APPOINTMENT (OUTPATIENT)
Dept: PULMONOLOGY | Facility: CLINIC | Age: 57
End: 2022-09-22

## 2022-09-22 PROCEDURE — 94618 PULMONARY STRESS TESTING: CPT

## 2022-09-22 PROCEDURE — 99215 OFFICE O/P EST HI 40 MIN: CPT | Mod: 25

## 2022-09-22 PROCEDURE — ZZZZZ: CPT

## 2022-09-22 NOTE — HISTORY OF PRESENT ILLNESS
[TextBox_4] : Pt here for follow up pHTN/  PE and OLAMIDE. Pending CPAP delivery- some delay ^ recent recall. Continues on lasix for pHTN, supplemental O2 with sats at 88% on RA at rest. \par \par PT REPORTS  H/O PE IN 1995, 2010 AND 2012  ----VQ scan 7/2021 low prob for pE--- has pulmonary hypertension\par 6MWT 2022--------ON 3L/ METERS\par CTA chest 7/2021\par IMPRESSION:\par No main, right, left, or lobar pulmonary embolism. Limited evaluation of segmental and subsegmental pulmonary arteries.\par No pneumonia.\par \par VQ scan 7/2021 low prob for pE\par ----VQ scan 2022-- low prob for pE-\par \par PFT 8/2021 moderate to severe restrictive lung disease\par \par HST w/TERESA 16.9- still has not received cpap\par \par echo 7/2021 \par CONCLUSIONS:\par Technically difficult study.\par 1. Normal mitral valve. Minimal mitral regurgitation.\par 2. Normal left ventricular internal dimensions and wall\par thicknesses.\par 3. Endocardium not well visualized; grossly normal left\par ventricular systolic function.  Endocardial visualization\par \par enhanced with intravenous injection of echo contrast\par (Definity).\par 4. Unable to accurately evaluate right ventricular size or\par systolic function.\par \par \par cardiac cath 7/2021 \par mean PA 30\par Aortic Pressure (S/D/M): 141/77/100\par Pressures:  -- Aortic Pressure (S/D/M): 53/20/34\par Pressures:  -- Pulmonary Capillary Wedge: 17/15/13\par Pressures:  -- Right Atrium (a/v/M): 16/11/10\par Pressures:  -- Right Ventricle (s/edp): 56/11/--\par CO by Ranjan: 7.79\par Pulmonary vascular resistance (Wood Units): 2.69\par Total pulmonary resistance (Wood Units): 4.36\par \par \par 1/6/2022 pulm htn Group I started on sildenafil 20mg tid and diuretics, on continuous oxygen\par On eliquis since 7/2021 for [place]\par She is c/o exertional dyspnea\par Has OLAMIDE - not yet received cpap\par \par 2/22/2022 pulm htn group1 on 20 mg TID sildenafil,  diuretics, continuously oxygen 3 LMP\par on eliquis since 7/2021 \par she is still experiencing exertional dyspnea \par AWAITING  CPAP ---- cpap okay\par \par march 2022--------pulm htn group1 on 20 mg TID sildenafil,  diuretics, continuously oxygen 3 LMP\par on eliquis since 7/2021 \par she is still experiencing exertional dyspnea ---enouraged to partcipate i pulmonary rehab--WT LOSS ADVISED \par AWAITING  CPAP ---- cpap okay-----AGREE WITH HER REQUEST FRO DISABILITY\par \par 4/2022 pulm htn treated w/sildenafil 20mg tid- stable from resp perspective, on continuous oxygen and diuretics--- on Lasix will add SPIROLACTONE------  PHTN CONTD   sildenafil\par OSA_ unable to received cpap d/t coverage issue\par \par 5/2022 Feeling better than last visit with Lasix and Aldactone. Compliant with medications including Sildenafil 20mg TID, Lasix 40mg PO daily, and Aldactone 25mg TIW. Denies any fevers, chills. Currently on 3L nc O2. Endorses ET of at least 300 feet. Still unable to get her CPAP due to insurance issues. \par \par \par \par 7/2022 TTM with pt- unable to come in d/t transportation issues\par Pulm htn- on sildenafil 20mg tid, lasix 40mg bid, and spironolactone 25mg 3x weekly- having pedal edema. uses oxygen 3lpm x 24hrs, o2 sat drops to 88% room air rest.\par s/p cardiology appt with Dr Guillory- has f/u again in Sept\par OLAMIDE- not on cpap d/t lack of DME coverage- anticipates new coverage 12/2022\par \par 9/2022--ON SIDLENAFIL ----LASIX AND ALDACTONE --- Patient doing well, continued to take aldactone three times a week. Eating meals from her daughter who is a nutritionist, attempting to lose weight. States that her LE edema is improved. Continues to use oxygen. Completed 6MWT today.

## 2022-09-22 NOTE — ASSESSMENT
[FreeTextEntry1] : ---Assessment plan----------The patient has been referred here for further opinion regarding pulmonary problem,\par  \par Pt here for follow up pHTN/  PE and OLAMIDE. Pending CPAP delivery- some delay ^ recent recall. Continues on lasix for pHTN, supplemental O2 with sats at 88% on RA at rest. \par PT REPORTS  H/O PE IN 1995, 2010 AND 2012  ----VQ scan 7/2021 low prob for pE--- has pulmonary hypertension\par \par 1) pulmonary hypertension----- continue sildenafil 20mg TID, Lasix 40mg daily and Aldactone 25mg, increase to 5 times weekly (she continued to take three times weekly after last visit). Will try to add macetentan\par 2) medically necessary to use oxygen x 24hrs\par 3) olamide- needs cpap- await DME coverage wt loss advised--in meantime- will use nocturnal o2. Will try to obtain CPAP\par 4) Labs today\par 5) needs pulmonary rehab\par 6) Repeat CTA chest\par \par Thanks for allowing  me to participate  in the care of this patient.  Patient at this time  will follow  the above mentioned recommendations and return back for follow up visit. If you have any questions  I can be reached  at # [phone] (office).\par \par Rc Byrne MD, [facility] \par Director, Pulmonary Hypertension Program \par [facility] \par Division of Pulmonary, Critical Care and Sleep Medicine \par  Professor of Medicine \par [employer]\par \par \par \par \par \par

## 2022-11-12 ENCOUNTER — OUTPATIENT (OUTPATIENT)
Dept: OUTPATIENT SERVICES | Facility: HOSPITAL | Age: 57
LOS: 1 days | End: 2022-11-12
Payer: COMMERCIAL

## 2022-11-12 ENCOUNTER — APPOINTMENT (OUTPATIENT)
Dept: CT IMAGING | Facility: IMAGING CENTER | Age: 57
End: 2022-11-12

## 2022-11-12 DIAGNOSIS — I26.99 OTHER PULMONARY EMBOLISM WITHOUT ACUTE COR PULMONALE: ICD-10-CM

## 2022-11-12 DIAGNOSIS — I27.20 PULMONARY HYPERTENSION, UNSPECIFIED: ICD-10-CM

## 2022-11-12 DIAGNOSIS — Z98.891 HISTORY OF UTERINE SCAR FROM PREVIOUS SURGERY: Chronic | ICD-10-CM

## 2022-11-12 DIAGNOSIS — Z00.8 ENCOUNTER FOR OTHER GENERAL EXAMINATION: ICD-10-CM

## 2022-11-12 PROCEDURE — 71275 CT ANGIOGRAPHY CHEST: CPT | Mod: 26

## 2022-11-12 PROCEDURE — 71275 CT ANGIOGRAPHY CHEST: CPT

## 2022-11-29 ENCOUNTER — MED ADMIN CHARGE (OUTPATIENT)
Age: 57
End: 2022-11-29

## 2022-11-29 ENCOUNTER — APPOINTMENT (OUTPATIENT)
Dept: PULMONOLOGY | Facility: CLINIC | Age: 57
End: 2022-11-29

## 2022-11-29 ENCOUNTER — NON-APPOINTMENT (OUTPATIENT)
Age: 57
End: 2022-11-29

## 2022-11-29 VITALS
SYSTOLIC BLOOD PRESSURE: 126 MMHG | DIASTOLIC BLOOD PRESSURE: 76 MMHG | BODY MASS INDEX: 53.92 KG/M2 | OXYGEN SATURATION: 96 % | RESPIRATION RATE: 15 BRPM | HEIGHT: 62 IN | TEMPERATURE: 97.6 F | WEIGHT: 293 LBS | HEART RATE: 112 BPM

## 2022-11-29 DIAGNOSIS — Z78.0 ASYMPTOMATIC MENOPAUSAL STATE: ICD-10-CM

## 2022-11-29 DIAGNOSIS — Z23 ENCOUNTER FOR IMMUNIZATION: ICD-10-CM

## 2022-11-29 LAB
BASOPHILS # BLD AUTO: 0.02 K/UL
BASOPHILS NFR BLD AUTO: 0.3 %
EOSINOPHIL # BLD AUTO: 0.1 K/UL
EOSINOPHIL NFR BLD AUTO: 1.5 %
HCT VFR BLD CALC: 48.1 %
HGB BLD-MCNC: 14.2 G/DL
IMM GRANULOCYTES NFR BLD AUTO: 0.2 %
LYMPHOCYTES # BLD AUTO: 1.39 K/UL
LYMPHOCYTES NFR BLD AUTO: 21 %
MAN DIFF?: NORMAL
MCHC RBC-ENTMCNC: 28 PG
MCHC RBC-ENTMCNC: 29.5 GM/DL
MCV RBC AUTO: 94.7 FL
MONOCYTES # BLD AUTO: 0.59 K/UL
MONOCYTES NFR BLD AUTO: 8.9 %
NEUTROPHILS # BLD AUTO: 4.51 K/UL
NEUTROPHILS NFR BLD AUTO: 68.1 %
NT-PROBNP SERPL-MCNC: 14 PG/ML
PLATELET # BLD AUTO: 279 K/UL
RBC # BLD: 5.08 M/UL
RBC # FLD: 13.5 %
WBC # FLD AUTO: 6.62 K/UL

## 2022-11-29 PROCEDURE — 90686 IIV4 VACC NO PRSV 0.5 ML IM: CPT

## 2022-11-29 PROCEDURE — 99215 OFFICE O/P EST HI 40 MIN: CPT | Mod: 25

## 2022-11-29 PROCEDURE — G0008: CPT

## 2022-11-29 NOTE — HISTORY OF PRESENT ILLNESS
[TextBox_4] : Pt here for follow up pHTN/  PE and OLAMIDE. Pending CPAP delivery- some delay ^ recent recall. Continues on lasix for pHTN, supplemental O2 with sats at 88% on RA at rest. \par \par PT REPORTS  H/O PE IN 1995, 2010 AND 2012  ----VQ scan 7/2021 low prob for pE--- has pulmonary hypertension\par 6MWT 2022--------ON 3L/ METERS\par CTA chest 7/2021\par IMPRESSION:\par No main, right, left, or lobar pulmonary embolism. Limited evaluation of segmental and subsegmental pulmonary arteries.\par No pneumonia.\par \par VQ scan 7/2021 low prob for pE\par ----VQ scan 2022-- low prob for pE-\par \par PFT 8/2021 moderate to severe restrictive lung disease\par \par HST w/TERESA 16.9- still has not received cpap\par \par echo 7/2021 \par CONCLUSIONS:\par Technically difficult study.\par 1. Normal mitral valve. Minimal mitral regurgitation.\par 2. Normal left ventricular internal dimensions and wall\par thicknesses.\par 3. Endocardium not well visualized; grossly normal left\par ventricular systolic function.  Endocardial visualization\par \par enhanced with intravenous injection of echo contrast\par (Definity).\par 4. Unable to accurately evaluate right ventricular size or\par systolic function.\par \par \par cardiac cath 7/2021 \par mean PA 30\par Aortic Pressure (S/D/M): 141/77/100\par Pressures:  -- Aortic Pressure (S/D/M): 53/20/34\par Pressures:  -- Pulmonary Capillary Wedge: 17/15/13\par Pressures:  -- Right Atrium (a/v/M): 16/11/10\par Pressures:  -- Right Ventricle (s/edp): 56/11/--\par CO by Ranjan: 7.79\par Pulmonary vascular resistance (Wood Units): 2.69\par Total pulmonary resistance (Wood Units): 4.36\par \par \par 1/6/2022 pulm htn Group I started on sildenafil 20mg tid and diuretics, on continuous oxygen\par On eliquis since 7/2021 for [place]\par She is c/o exertional dyspnea\par Has OLAMIDE - not yet received cpap\par \par 2/22/2022 pulm htn group1 on 20 mg TID sildenafil,  diuretics, continuously oxygen 3 LMP\par on eliquis since 7/2021 \par she is still experiencing exertional dyspnea \par AWAITING  CPAP ---- cpap okay\par \par march 2022--------pulm htn group1 on 20 mg TID sildenafil,  diuretics, continuously oxygen 3 LMP\par on eliquis since 7/2021 \par she is still experiencing exertional dyspnea ---enouraged to partcipate i pulmonary rehab--WT LOSS ADVISED \par AWAITING  CPAP ---- cpap okay-----AGREE WITH HER REQUEST FRO DISABILITY\par \par 4/2022 pulm htn treated w/sildenafil 20mg tid- stable from resp perspective, on continuous oxygen and diuretics--- on Lasix will add SPIROLACTONE------  PHTN CONTD   sildenafil\par OSA_ unable to received cpap d/t coverage issue\par \par 5/2022 Feeling better than last visit with Lasix and Aldactone. Compliant with medications including Sildenafil 20mg TID, Lasix 40mg PO daily, and Aldactone 25mg TIW. Denies any fevers, chills. Currently on 3L nc O2. Endorses ET of at least 300 feet. Still unable to get her CPAP due to insurance issues. \par \par \par \par 7/2022 TTM with pt- unable to come in d/t transportation issues\par Pulm htn- on sildenafil 20mg tid, lasix 40mg bid, and spironolactone 25mg 3x weekly- having pedal edema. uses oxygen 3lpm x 24hrs, o2 sat drops to 88% room air rest.\par s/p cardiology appt with Dr Guillory- has f/u again in Sept\par OLAMIDE- not on cpap d/t lack of DME coverage- anticipates new coverage 12/2022\par \par 9/2022--ON SILDENAFIL ----LASIX AND ALDACTONE --- Patient doing well, continued to take aldactone three times a week. Eating meals from her daughter who is a nutritionist, attempting to lose weight. States that her LE edema is improved. Continues to use oxygen. Completed 6MWT today.\par \par NOV 2022------ON SILDENAFIL ----LASIX AND ALDACTONE --- Patient doing well, continued to take aldactone three times a week.  On home oxygen 2 to 3 L/min---------. States that her LE edema is improved. Continues to use oxygen.------ awaiting CPAP study for her sleep apnea----------- needs a repeat echo

## 2022-11-29 NOTE — HISTORY OF PRESENT ILLNESS
[TextBox_4] : Pt here for follow up pHTN/  PE and OLAMIDE. Pending CPAP delivery- some delay ^ recent recall. Continues on lasix for pHTN, supplemental O2 with sats at 88% on RA at rest. \par \par PT REPORTS  H/O PE IN 1995, 2010 AND 2012  ----VQ scan 7/2021 low prob for pE--- has pulmonary hypertension\par 6MWT 2022--------ON 3L/ METERS\par CTA chest 7/2021\par IMPRESSION:\par No main, right, left, or lobar pulmonary embolism. Limited evaluation of segmental and subsegmental pulmonary arteries.\par No pneumonia.\par \par VQ scan 7/2021 low prob for pE\par ----VQ scan 2022-- low prob for pE-\par \par PFT 8/2021 moderate to severe restrictive lung disease\par \par HST w/TERESA 16.9- still has not received cpap\par \par echo 7/2021 \par CONCLUSIONS:\par Technically difficult study.\par 1. Normal mitral valve. Minimal mitral regurgitation.\par 2. Normal left ventricular internal dimensions and wall\par thicknesses.\par 3. Endocardium not well visualized; grossly normal left\par ventricular systolic function.  Endocardial visualization\par \par enhanced with intravenous injection of echo contrast\par (Definity).\par 4. Unable to accurately evaluate right ventricular size or\par systolic function.\par \par \par cardiac cath 7/2021 \par mean PA 30\par Aortic Pressure (S/D/M): 141/77/100\par Pressures:  -- Aortic Pressure (S/D/M): 53/20/34\par Pressures:  -- Pulmonary Capillary Wedge: 17/15/13\par Pressures:  -- Right Atrium (a/v/M): 16/11/10\par Pressures:  -- Right Ventricle (s/edp): 56/11/--\par CO by Ranjan: 7.79\par Pulmonary vascular resistance (Wood Units): 2.69\par Total pulmonary resistance (Wood Units): 4.36\par \par \par 1/6/2022 pulm htn Group I started on sildenafil 20mg tid and diuretics, on continuous oxygen\par On eliquis since 7/2021 for [place]\par She is c/o exertional dyspnea\par Has OLAMIDE - not yet received cpap\par \par 2/22/2022 pulm htn group1 on 20 mg TID sildenafil,  diuretics, continuously oxygen 3 LMP\par on eliquis since 7/2021 \par she is still experiencing exertional dyspnea \par AWAITING  CPAP ---- cpap okay\par \par march 2022--------pulm htn group1 on 20 mg TID sildenafil,  diuretics, continuously oxygen 3 LMP\par on eliquis since 7/2021 \par she is still experiencing exertional dyspnea ---enouraged to partcipate i pulmonary rehab--WT LOSS ADVISED \par AWAITING  CPAP ---- cpap okay-----AGREE WITH HER REQUEST FRO DISABILITY\par \par 4/2022 pulm htn treated w/sildenafil 20mg tid- stable from resp perspective, on continuous oxygen and diuretics--- on Lasix will add SPIROLACTONE------  PHTN CONTD   sildenafil\par OSA_ unable to received cpap d/t coverage issue\par \par 5/2022 Feeling better than last visit with Lasix and Aldactone. Compliant with medications including Sildenafil 20mg TID, Lasix 40mg PO daily, and Aldactone 25mg TIW. Denies any fevers, chills. Currently on 3L nc O2. Endorses ET of at least 300 feet. Still unable to get her CPAP due to insurance issues. \par \par \par \par 7/2022 TTM with pt- unable to come in d/t transportation issues\par Pulm htn- on sildenafil 20mg tid, lasix 40mg bid, and spironolactone 25mg 3x weekly- having pedal edema. uses oxygen 3lpm x 24hrs, o2 sat drops to 88% room air rest.\par s/p cardiology appt with Dr Guillory- has f/u again in Sept\par OLAMIDE- not on cpap d/t lack of DME coverage- anticipates new coverage 12/2022\par \par 9/2022--ON SILDENAFIL ----LASIX AND ALDACTONE --- Patient doing well, continued to take aldactone three times a week. Eating meals from her daughter who is a nutritionist, attempting to lose weight. States that her LE edema is improved. Continues to use oxygen. Completed 6MWT today.\par \par NOV 2022------ON SILDENAFIL ----LASIX AND ALDACTONE --- Patient doing well, continued to take aldactone three times a week.  On home oxygen 2 to 3 L/min---------. States that her LE edema is improved. Continues to use oxygen.------ awaiting CPAP study for her sleep apnea----------- needs a repeat echo

## 2022-11-29 NOTE — ASSESSMENT
[FreeTextEntry1] : ---Assessment plan----------The patient has been referred here for further opinion regarding pulmonary problem,\par  \par Pt here for follow up pHTN/  PE and OLAMIDE. Pending CPAP delivery- some delay ^ recent recall. Continues on lasix ,ALDACTONE for pHTN, supplemental O2 with sats at 88% on RA at rest. \par PT REPORTS  H/O PE IN 1995, 2010 AND 2012  ----VQ scan 7/2021 low prob for pE--- has pulmonary hypertension\par \par 1) pulmonary hypertension----- continue sildenafil 20mg TID, Lasix 40mg daily and Aldactone 25mg, increase to 5 times weekly (she continued to take three times weekly after last visit). Will try to add macetentan\par 2) medically necessary to use oxygen x 24hrs\par 3) olamide- needs cpap- await DME coverage wt loss advised--in meantime- will use nocturnal o2. Will try to obtain CPAP ---------WT LOSS ADVISED  --  --\par 4) Labs today\par 5) needs pulmonary rehab\par 6) Repeat CTA chest\par \par Thanks for allowing  me to participate  in the care of this patient.  Patient at this time  will follow  the above mentioned recommendations and return back for follow up visit. If you have any questions  I can be reached  at # [phone] (office).\par \par Rc Byrne MD, [facility] \par Director, Pulmonary Hypertension Program \par [facility] \par Division of Pulmonary, Critical Care and Sleep Medicine \par  Professor of Medicine \par [employer]\par \par \par \par \par \par

## 2022-11-29 NOTE — ASSESSMENT
[FreeTextEntry1] : ---Assessment plan----------The patient has been referred here for further opinion regarding pulmonary problem,\par  \par Pt here for follow up pHTN/  PE and OLAMIDE. Pending CPAP delivery- some delay ^ recent recall. Continues on lasix ,ALDACTONE for pHTN, supplemental O2 with sats at 88% on RA at rest. \par PT REPORTS  H/O PE IN 1995, 2010 AND 2012  ----VQ scan 7/2021 low prob for pE--- has pulmonary hypertension\par \par 1) pulmonary hypertension----- continue sildenafil 20mg TID, Lasix 40mg daily and Aldactone 25mg, increase to 5 times weekly (she continued to take three times weekly after last visit). Will try to add macetentan\par 2) medically necessary to use oxygen x 24hrs\par 3) olamide- needs cpap- await DME coverage wt loss advised--in meantime- will use nocturnal o2. Will try to obtain CPAP ---------WT LOSS ADVISED  --  --\par 4) Labs today\par 5) needs pulmonary rehab\par 6) Repeat CTA chest\par \par Thanks for allowing  me to participate  in the care of this patient.  Patient at this time  will follow  the above mentioned recommendations and return back for follow up visit. If you have any questions  I can be reached  at # [phone] (office).\par \par Rc Byrne MD, [facility] \par Director, Pulmonary Hypertension Program \par [facility] \par Division of Pulmonary, Critical Care and Sleep Medicine \par  Professor of Medicine \par [employer]\par \par \par \par \par \par

## 2022-11-30 LAB
ALBUMIN SERPL ELPH-MCNC: 4.6 G/DL
ALP BLD-CCNC: 161 U/L
ALT SERPL-CCNC: 23 U/L
ANION GAP SERPL CALC-SCNC: 12 MMOL/L
AST SERPL-CCNC: 21 U/L
BILIRUB SERPL-MCNC: 0.4 MG/DL
BUN SERPL-MCNC: 12 MG/DL
CALCIUM SERPL-MCNC: 10.1 MG/DL
CHLORIDE SERPL-SCNC: 96 MMOL/L
CHOLEST SERPL-MCNC: 253 MG/DL
CO2 SERPL-SCNC: 36 MMOL/L
CREAT SERPL-MCNC: 0.79 MG/DL
EGFR: 88 ML/MIN/1.73M2
ESTIMATED AVERAGE GLUCOSE: 131 MG/DL
GLUCOSE SERPL-MCNC: 121 MG/DL
HBA1C MFR BLD HPLC: 6.2 %
HDLC SERPL-MCNC: 48 MG/DL
LDLC SERPL CALC-MCNC: 188 MG/DL
NONHDLC SERPL-MCNC: 205 MG/DL
POTASSIUM SERPL-SCNC: 4.1 MMOL/L
PROT SERPL-MCNC: 7.3 G/DL
SODIUM SERPL-SCNC: 144 MMOL/L
TRIGL SERPL-MCNC: 85 MG/DL
TSH SERPL-ACNC: 2.15 UIU/ML

## 2022-12-22 ENCOUNTER — TRANSCRIPTION ENCOUNTER (OUTPATIENT)
Age: 57
End: 2022-12-22

## 2023-01-18 ENCOUNTER — INPATIENT (INPATIENT)
Facility: HOSPITAL | Age: 58
LOS: 7 days | Discharge: HOME | End: 2023-01-26
Attending: INTERNAL MEDICINE | Admitting: INTERNAL MEDICINE
Payer: COMMERCIAL

## 2023-01-18 VITALS — HEART RATE: 91 BPM | OXYGEN SATURATION: 60 % | TEMPERATURE: 98 F | RESPIRATION RATE: 22 BRPM

## 2023-01-18 DIAGNOSIS — Z29.9 ENCOUNTER FOR PROPHYLACTIC MEASURES, UNSPECIFIED: ICD-10-CM

## 2023-01-18 DIAGNOSIS — I10 ESSENTIAL (PRIMARY) HYPERTENSION: ICD-10-CM

## 2023-01-18 DIAGNOSIS — I27.20 PULMONARY HYPERTENSION, UNSPECIFIED: ICD-10-CM

## 2023-01-18 DIAGNOSIS — Z98.891 HISTORY OF UTERINE SCAR FROM PREVIOUS SURGERY: Chronic | ICD-10-CM

## 2023-01-18 DIAGNOSIS — Z86.711 PERSONAL HISTORY OF PULMONARY EMBOLISM: ICD-10-CM

## 2023-01-18 DIAGNOSIS — J96.21 ACUTE AND CHRONIC RESPIRATORY FAILURE WITH HYPOXIA: ICD-10-CM

## 2023-01-18 DIAGNOSIS — J96.01 ACUTE RESPIRATORY FAILURE WITH HYPOXIA: ICD-10-CM

## 2023-01-18 DIAGNOSIS — R74.01 ELEVATION OF LEVELS OF LIVER TRANSAMINASE LEVELS: ICD-10-CM

## 2023-01-18 LAB
ALBUMIN SERPL ELPH-MCNC: 4.3 G/DL — SIGNIFICANT CHANGE UP (ref 3.3–5)
ALP SERPL-CCNC: 173 U/L — HIGH (ref 40–120)
ALT FLD-CCNC: 61 U/L — HIGH (ref 4–33)
ANION GAP SERPL CALC-SCNC: 5 MMOL/L — LOW (ref 7–14)
APPEARANCE UR: CLEAR — SIGNIFICANT CHANGE UP
APTT BLD: 34.4 SEC — SIGNIFICANT CHANGE UP (ref 27–36.3)
AST SERPL-CCNC: 32 U/L — SIGNIFICANT CHANGE UP (ref 4–32)
B PERT DNA SPEC QL NAA+PROBE: SIGNIFICANT CHANGE UP
B PERT+PARAPERT DNA PNL SPEC NAA+PROBE: SIGNIFICANT CHANGE UP
BACTERIA # UR AUTO: NEGATIVE — SIGNIFICANT CHANGE UP
BASE EXCESS BLDV CALC-SCNC: 26.7 MMOL/L — HIGH (ref -2–3)
BASOPHILS # BLD AUTO: 0.02 K/UL — SIGNIFICANT CHANGE UP (ref 0–0.2)
BASOPHILS NFR BLD AUTO: 0.3 % — SIGNIFICANT CHANGE UP (ref 0–2)
BILIRUB SERPL-MCNC: 0.8 MG/DL — SIGNIFICANT CHANGE UP (ref 0.2–1.2)
BILIRUB UR-MCNC: NEGATIVE — SIGNIFICANT CHANGE UP
BLOOD GAS ARTERIAL - LYTES,HGB,ICA,LACT RESULT: SIGNIFICANT CHANGE UP
BLOOD GAS ARTERIAL - LYTES,HGB,ICA,LACT RESULT: SIGNIFICANT CHANGE UP
BLOOD GAS VENOUS COMPREHENSIVE RESULT: SIGNIFICANT CHANGE UP
BORDETELLA PARAPERTUSSIS (RAPRVP): SIGNIFICANT CHANGE UP
BUN SERPL-MCNC: 13 MG/DL — SIGNIFICANT CHANGE UP (ref 7–23)
C PNEUM DNA SPEC QL NAA+PROBE: SIGNIFICANT CHANGE UP
CALCIUM SERPL-MCNC: 9.9 MG/DL — SIGNIFICANT CHANGE UP (ref 8.4–10.5)
CHLORIDE BLDV-SCNC: 95 MMOL/L — LOW (ref 96–108)
CHLORIDE SERPL-SCNC: 93 MMOL/L — LOW (ref 98–107)
CO2 BLDV-SCNC: 62.8 MMOL/L — HIGH (ref 22–26)
CO2 SERPL-SCNC: 49 MMOL/L — CRITICAL HIGH (ref 22–31)
COLOR SPEC: SIGNIFICANT CHANGE UP
CREAT SERPL-MCNC: 0.62 MG/DL — SIGNIFICANT CHANGE UP (ref 0.5–1.3)
DIFF PNL FLD: NEGATIVE — SIGNIFICANT CHANGE UP
EGFR: 105 ML/MIN/1.73M2 — SIGNIFICANT CHANGE UP
EOSINOPHIL # BLD AUTO: 0.1 K/UL — SIGNIFICANT CHANGE UP (ref 0–0.5)
EOSINOPHIL NFR BLD AUTO: 1.4 % — SIGNIFICANT CHANGE UP (ref 0–6)
EPI CELLS # UR: 3 /HPF — SIGNIFICANT CHANGE UP (ref 0–5)
FLUAV SUBTYP SPEC NAA+PROBE: SIGNIFICANT CHANGE UP
FLUBV RNA SPEC QL NAA+PROBE: SIGNIFICANT CHANGE UP
GAS PNL BLDV: 143 MMOL/L — SIGNIFICANT CHANGE UP (ref 136–145)
GLUCOSE BLDV-MCNC: 122 MG/DL — HIGH (ref 70–99)
GLUCOSE SERPL-MCNC: 123 MG/DL — HIGH (ref 70–99)
GLUCOSE UR QL: NEGATIVE — SIGNIFICANT CHANGE UP
HADV DNA SPEC QL NAA+PROBE: SIGNIFICANT CHANGE UP
HCG SERPL-ACNC: <5 MIU/ML — SIGNIFICANT CHANGE UP
HCO3 BLDV-SCNC: 59 MMOL/L — CRITICAL HIGH (ref 22–29)
HCOV 229E RNA SPEC QL NAA+PROBE: SIGNIFICANT CHANGE UP
HCOV HKU1 RNA SPEC QL NAA+PROBE: SIGNIFICANT CHANGE UP
HCOV NL63 RNA SPEC QL NAA+PROBE: SIGNIFICANT CHANGE UP
HCOV OC43 RNA SPEC QL NAA+PROBE: SIGNIFICANT CHANGE UP
HCT VFR BLD CALC: 43.1 % — SIGNIFICANT CHANGE UP (ref 34.5–45)
HCT VFR BLDA CALC: 38 % — SIGNIFICANT CHANGE UP (ref 34.5–46.5)
HGB BLD CALC-MCNC: 12.6 G/DL — SIGNIFICANT CHANGE UP (ref 11.5–15.5)
HGB BLD-MCNC: 11.9 G/DL — SIGNIFICANT CHANGE UP (ref 11.5–15.5)
HMPV RNA SPEC QL NAA+PROBE: SIGNIFICANT CHANGE UP
HPIV1 RNA SPEC QL NAA+PROBE: SIGNIFICANT CHANGE UP
HPIV2 RNA SPEC QL NAA+PROBE: SIGNIFICANT CHANGE UP
HPIV3 RNA SPEC QL NAA+PROBE: SIGNIFICANT CHANGE UP
HPIV4 RNA SPEC QL NAA+PROBE: SIGNIFICANT CHANGE UP
HYALINE CASTS # UR AUTO: 0 /LPF — SIGNIFICANT CHANGE UP (ref 0–7)
IANC: 5.84 K/UL — SIGNIFICANT CHANGE UP (ref 1.8–7.4)
IMM GRANULOCYTES NFR BLD AUTO: 1.6 % — HIGH (ref 0–0.9)
INR BLD: 1.29 RATIO — HIGH (ref 0.88–1.16)
KETONES UR-MCNC: NEGATIVE — SIGNIFICANT CHANGE UP
LACTATE BLDV-MCNC: 1.6 MMOL/L — SIGNIFICANT CHANGE UP (ref 0.5–2)
LEUKOCYTE ESTERASE UR-ACNC: ABNORMAL
LYMPHOCYTES # BLD AUTO: 0.92 K/UL — LOW (ref 1–3.3)
LYMPHOCYTES # BLD AUTO: 12.5 % — LOW (ref 13–44)
M PNEUMO DNA SPEC QL NAA+PROBE: SIGNIFICANT CHANGE UP
MCHC RBC-ENTMCNC: 27.4 PG — SIGNIFICANT CHANGE UP (ref 27–34)
MCHC RBC-ENTMCNC: 27.6 GM/DL — LOW (ref 32–36)
MCV RBC AUTO: 99.1 FL — SIGNIFICANT CHANGE UP (ref 80–100)
MONOCYTES # BLD AUTO: 0.37 K/UL — SIGNIFICANT CHANGE UP (ref 0–0.9)
MONOCYTES NFR BLD AUTO: 5 % — SIGNIFICANT CHANGE UP (ref 2–14)
NEUTROPHILS # BLD AUTO: 5.84 K/UL — SIGNIFICANT CHANGE UP (ref 1.8–7.4)
NEUTROPHILS NFR BLD AUTO: 79.2 % — HIGH (ref 43–77)
NITRITE UR-MCNC: NEGATIVE — SIGNIFICANT CHANGE UP
NRBC # BLD: 0 /100 WBCS — SIGNIFICANT CHANGE UP (ref 0–0)
NRBC # FLD: 0.03 K/UL — HIGH (ref 0–0)
NT-PROBNP SERPL-SCNC: 151 PG/ML — SIGNIFICANT CHANGE UP
PCO2 BLDV: 115 MMHG — HIGH (ref 39–42)
PH BLDV: 7.32 — SIGNIFICANT CHANGE UP (ref 7.32–7.43)
PH UR: 6.5 — SIGNIFICANT CHANGE UP (ref 5–8)
PLATELET # BLD AUTO: 241 K/UL — SIGNIFICANT CHANGE UP (ref 150–400)
PO2 BLDV: 35 MMHG — SIGNIFICANT CHANGE UP
POTASSIUM BLDV-SCNC: 3.4 MMOL/L — LOW (ref 3.5–5.1)
POTASSIUM SERPL-MCNC: 3.5 MMOL/L — SIGNIFICANT CHANGE UP (ref 3.5–5.3)
POTASSIUM SERPL-SCNC: 3.5 MMOL/L — SIGNIFICANT CHANGE UP (ref 3.5–5.3)
PROCALCITONIN SERPL-MCNC: 0.07 NG/ML — SIGNIFICANT CHANGE UP (ref 0.02–0.1)
PROT SERPL-MCNC: 7.8 G/DL — SIGNIFICANT CHANGE UP (ref 6–8.3)
PROT UR-MCNC: NEGATIVE — SIGNIFICANT CHANGE UP
PROTHROM AB SERPL-ACNC: 15 SEC — HIGH (ref 10.5–13.4)
RAPID RVP RESULT: SIGNIFICANT CHANGE UP
RBC # BLD: 4.35 M/UL — SIGNIFICANT CHANGE UP (ref 3.8–5.2)
RBC # FLD: 13.8 % — SIGNIFICANT CHANGE UP (ref 10.3–14.5)
RBC CASTS # UR COMP ASSIST: 2 /HPF — SIGNIFICANT CHANGE UP (ref 0–4)
RSV RNA SPEC QL NAA+PROBE: SIGNIFICANT CHANGE UP
RV+EV RNA SPEC QL NAA+PROBE: SIGNIFICANT CHANGE UP
SAO2 % BLDV: 55.6 % — SIGNIFICANT CHANGE UP
SARS-COV-2 RNA SPEC QL NAA+PROBE: SIGNIFICANT CHANGE UP
SODIUM SERPL-SCNC: 147 MMOL/L — HIGH (ref 135–145)
SP GR SPEC: 1.01 — SIGNIFICANT CHANGE UP (ref 1.01–1.05)
TROPONIN T, HIGH SENSITIVITY RESULT: 8 NG/L — SIGNIFICANT CHANGE UP
UROBILINOGEN FLD QL: SIGNIFICANT CHANGE UP
WBC # BLD: 7.37 K/UL — SIGNIFICANT CHANGE UP (ref 3.8–10.5)
WBC # FLD AUTO: 7.37 K/UL — SIGNIFICANT CHANGE UP (ref 3.8–10.5)
WBC UR QL: 9 /HPF — HIGH (ref 0–5)

## 2023-01-18 PROCEDURE — 99233 SBSQ HOSP IP/OBS HIGH 50: CPT | Mod: GC

## 2023-01-18 PROCEDURE — 93308 TTE F-UP OR LMTD: CPT | Mod: 26

## 2023-01-18 PROCEDURE — 71045 X-RAY EXAM CHEST 1 VIEW: CPT | Mod: 26

## 2023-01-18 PROCEDURE — 99223 1ST HOSP IP/OBS HIGH 75: CPT | Mod: GC

## 2023-01-18 PROCEDURE — 99497 ADVNCD CARE PLAN 30 MIN: CPT | Mod: 25

## 2023-01-18 PROCEDURE — 99291 CRITICAL CARE FIRST HOUR: CPT

## 2023-01-18 RX ORDER — FUROSEMIDE 40 MG
40 TABLET ORAL ONCE
Refills: 0 | Status: COMPLETED | OUTPATIENT
Start: 2023-01-18 | End: 2023-01-18

## 2023-01-18 RX ORDER — MAGNESIUM SULFATE 500 MG/ML
2 VIAL (ML) INJECTION ONCE
Refills: 0 | Status: COMPLETED | OUTPATIENT
Start: 2023-01-18 | End: 2023-01-18

## 2023-01-18 RX ORDER — FERROUS SULFATE 325(65) MG
324 TABLET ORAL
Qty: 0 | Refills: 0 | DISCHARGE

## 2023-01-18 RX ORDER — LOSARTAN POTASSIUM 100 MG/1
100 TABLET, FILM COATED ORAL DAILY
Refills: 0 | Status: DISCONTINUED | OUTPATIENT
Start: 2023-01-18 | End: 2023-01-21

## 2023-01-18 RX ORDER — MONTELUKAST 4 MG/1
10 TABLET, CHEWABLE ORAL DAILY
Refills: 0 | Status: DISCONTINUED | OUTPATIENT
Start: 2023-01-18 | End: 2023-01-26

## 2023-01-18 RX ORDER — POTASSIUM CHLORIDE 20 MEQ
10 PACKET (EA) ORAL
Refills: 0 | Status: COMPLETED | OUTPATIENT
Start: 2023-01-18 | End: 2023-01-18

## 2023-01-18 RX ORDER — APIXABAN 2.5 MG/1
5 TABLET, FILM COATED ORAL EVERY 12 HOURS
Refills: 0 | Status: DISCONTINUED | OUTPATIENT
Start: 2023-01-18 | End: 2023-01-26

## 2023-01-18 RX ORDER — AMLODIPINE BESYLATE 2.5 MG/1
1 TABLET ORAL
Qty: 0 | Refills: 0 | DISCHARGE

## 2023-01-18 RX ORDER — IPRATROPIUM/ALBUTEROL SULFATE 18-103MCG
3 AEROSOL WITH ADAPTER (GRAM) INHALATION ONCE
Refills: 0 | Status: COMPLETED | OUTPATIENT
Start: 2023-01-18 | End: 2023-01-18

## 2023-01-18 RX ORDER — MACITENTAN 10 MG/1
10 TABLET, FILM COATED ORAL DAILY
Refills: 0 | Status: DISCONTINUED | OUTPATIENT
Start: 2023-01-18 | End: 2023-01-19

## 2023-01-18 RX ORDER — MONTELUKAST 4 MG/1
1 TABLET, CHEWABLE ORAL
Qty: 0 | Refills: 0 | DISCHARGE

## 2023-01-18 RX ORDER — SPIRONOLACTONE 25 MG/1
25 TABLET, FILM COATED ORAL
Refills: 0 | Status: DISCONTINUED | OUTPATIENT
Start: 2023-01-18 | End: 2023-01-18

## 2023-01-18 RX ORDER — IPRATROPIUM/ALBUTEROL SULFATE 18-103MCG
3 AEROSOL WITH ADAPTER (GRAM) INHALATION EVERY 6 HOURS
Refills: 0 | Status: DISCONTINUED | OUTPATIENT
Start: 2023-01-18 | End: 2023-01-20

## 2023-01-18 RX ORDER — AMLODIPINE BESYLATE 2.5 MG/1
10 TABLET ORAL DAILY
Refills: 0 | Status: DISCONTINUED | OUTPATIENT
Start: 2023-01-18 | End: 2023-01-21

## 2023-01-18 RX ORDER — SPIRONOLACTONE 25 MG/1
1 TABLET, FILM COATED ORAL
Qty: 0 | Refills: 0 | DISCHARGE

## 2023-01-18 RX ORDER — FUROSEMIDE 40 MG
40 TABLET ORAL
Refills: 0 | Status: DISCONTINUED | OUTPATIENT
Start: 2023-01-19 | End: 2023-01-19

## 2023-01-18 RX ADMIN — Medication 100 MILLIEQUIVALENT(S): at 16:32

## 2023-01-18 RX ADMIN — Medication 3 MILLILITER(S): at 10:39

## 2023-01-18 RX ADMIN — Medication 100 MILLIEQUIVALENT(S): at 18:03

## 2023-01-18 RX ADMIN — Medication 100 MILLIEQUIVALENT(S): at 20:41

## 2023-01-18 RX ADMIN — Medication 3 MILLILITER(S): at 10:53

## 2023-01-18 RX ADMIN — Medication 20 MILLIGRAM(S): at 23:18

## 2023-01-18 RX ADMIN — Medication 40 MILLIGRAM(S): at 16:32

## 2023-01-18 RX ADMIN — Medication 40 MILLIGRAM(S): at 12:19

## 2023-01-18 RX ADMIN — Medication 125 MILLIGRAM(S): at 10:39

## 2023-01-18 RX ADMIN — Medication 150 GRAM(S): at 10:39

## 2023-01-18 NOTE — ED PROVIDER NOTE - CLINICAL SUMMARY MEDICAL DECISION MAKING FREE TEXT BOX
----- Message from Steffen Maldonado MD sent at 10/13/2021 11:01 AM CDT -----  R breast ruptured cyst  We can follow clinically or schedule excision no signs of cancer     Patient notified. Patient verbalized understanding. Scheduled 1 st available excision and added to the wait list. Makayla Ling RN     Hussein Bernabe(PA) 56 y/o F with PMH morbid obesity, Pulm HTN, PE on eliquis, COPD on home oxygen 3L, OLAMIDE p/w increased WOB and hypoxia. pt reports having increased trouble with breathing and ems found her to be hypoxic to 60% on her 3L. Pt states she has been having shortness of breath since last night. has not taken any nebulizers today. denies fever, chills, chest pain. Patient with hypoxia in the setting of likely COPD exacerbation versus pneumonia versus pulmonary hypertension exacerbation versus pulmonary edema. We'll obtain CBC, CMP trop, probnp, proBNP Place patient on BiPAP,

## 2023-01-18 NOTE — H&P ADULT - PROBLEM SELECTOR PLAN 7
Pt with history of multiple PEs and LLE DVT, with 2 PEs with no clear inciting cause.  - Continue home Eliquis 5 mg BID for now  - Check CTA chest with IV contrast to r/o new PE

## 2023-01-18 NOTE — H&P ADULT - ATTENDING COMMENTS
58 yo F with PMH of morbid obesity, HTN, pulm HTN, multiple PEs and LLE DVT on Eliquis, COPD on home oxygen 3L NC, OLAMIDE on nocturnal BiPAP p/w acute on chronic hypoxemic & hypercapnic respiratory failure, likely i/s/o ADHF in setting of pulmonary hypertension, COPD, and OLAMIDE/OHS, though acute PE also a possibility. Currently being diuresed with IV Lasix 40 mg BID. TTE and CTA chest pending. MICU consulted, but pt not a candidate. Pulm consult to follow in AM. Monitor on tele and continuous pulse ox.

## 2023-01-18 NOTE — CONSULT NOTE ADULT - ASSESSMENT
58 y/o F with PMH morbid obesity, Pulm HTN, PE on Eliquis, COPD on home oxygen 3L, OLAMIDE p/w acute on Chronic Hypoxic-hypercarbic respiratory failure likely 2/2 volume overload in the setting of multiple contributing comorbidities requiring BIPAP. Now S/P Lasix and solumedrol with improvement    Recommendations:  - Patient satting well with respiratory distress and appears very comfortable on BIPAP  - Not MICU candidate at this time. Please reconsult MICU as needed  - Workup and continued management per Primary Team 56 y/o F with PMH morbid obesity, Pulm HTN, PE on Eliquis, COPD on home oxygen 3L, OLAMIDE p/w acute on Chronic Hypoxic-hypercarbic respiratory failure likely 2/2 volume overload in the setting of multiple contributing comorbidities requiring BIPAP. Now S/P Lasix and solumedrol with improvement    Recommendations:  - Patient satting well without respiratory distress and appears very comfortable on BIPAP  - Not MICU candidate at this time. Please reconsult MICU as needed  - Workup and continued management per Primary Team

## 2023-01-18 NOTE — ED PROVIDER NOTE - NS ED ROS FT
denies fever, chills, chest pain,+ SOB, abdominal pain, diarrhea, dysuria, syncope, bleeding, new rash,weakness, numbness, blurred vision    ROS  otherwise negative as per HPI

## 2023-01-18 NOTE — H&P ADULT - NSHPSOCIALHISTORY_GEN_ALL_CORE
Lives with daughter. Mother, sister, and niece live downstairs. She worked for the [employer] for 25 years and recently retired. She denies tobacco, alcohol, or illicit drug use.

## 2023-01-18 NOTE — H&P ADULT - NSHPPHYSICALEXAM_GEN_ALL_CORE
VITALS:   T(C): 36.7 (01-18-23 @ 11:07), Max: 36.7 (01-18-23 @ 10:06)  HR: 90 (01-18-23 @ 16:56) (78 - 91)  BP: 131/93 (01-18-23 @ 16:56) (131/93 - 153/90)  RR: 26 (01-18-23 @ 16:56) (22 - 26)  SpO2: 96% (01-18-23 @ 16:56) (60% - 98%)    GENERAL: NAD, lying in bed comfortably  HEAD:  Atraumatic, Normocephalic  EYES: EOMI, PERRLA, conjunctiva and sclera clear  ENT: Moist mucous membranes  NECK: Supple, No JVD  CHEST/LUNG: Clear to auscultation bilaterally; No rales, rhonchi, wheezing, or rubs. Unlabored respirations  HEART: Regular rate and rhythm; No murmurs, rubs, or gallops  ABDOMEN: BSx4; Soft, nontender, nondistended  EXTREMITIES:  2+ Peripheral Pulses, brisk capillary refill. No clubbing, cyanosis, or edema  NERVOUS SYSTEM:  A&Ox3, no focal deficits   SKIN: No rashes or lesions  Psych: Normal speech, normal behavior, normal affect VITALS:   T(C): 36.7 (01-18-23 @ 11:07), Max: 36.7 (01-18-23 @ 10:06)  HR: 90 (01-18-23 @ 16:56) (78 - 91)  BP: 131/93 (01-18-23 @ 16:56) (131/93 - 153/90)  RR: 26 (01-18-23 @ 16:56) (22 - 26)  SpO2: 96% (01-18-23 @ 16:56) (60% - 98%)    GENERAL: Obese, on BiPAP, in no distress  HEAD:  Atraumatic, Normocephalic  EYES: EOMI, PERRLA, conjunctiva and sclera clear  ENT: Moist mucous membranes  NECK: Supple, No JVD  CHEST/LUNG: Clear to auscultation bilaterally; No rales, rhonchi, wheezing, or rubs. On BiPAP, able to speak in full sentences  HEART: Regular rate and rhythm; No murmurs, rubs, or gallops  ABDOMEN: BSx4; Soft, nontender, nondistended  EXTREMITIES:  2+ Peripheral Pulses, brisk capillary refill. No clubbing, cyanosis, or edema  NERVOUS SYSTEM:  A&Ox3, no focal deficits   SKIN: No rashes or lesions  Psych: Normal speech, normal behavior, normal affect VITALS:   T(C): 36.7 (01-18-23 @ 11:07), Max: 36.7 (01-18-23 @ 10:06)  HR: 90 (01-18-23 @ 16:56) (78 - 91)  BP: 131/93 (01-18-23 @ 16:56) (131/93 - 153/90)  RR: 26 (01-18-23 @ 16:56) (22 - 26)  SpO2: 96% (01-18-23 @ 16:56) (60% - 98%)    GENERAL: Obese, on BiPAP, in no distress  HEAD:  Atraumatic, normocephalic  EYES: EOMI, PERRLA, conjunctiva and sclera clear  MOUTH: Minimal oral secretions, moist mucous membranes  NECK: Supple, full ROM, no JVD, no LAD, trachea midline  CHEST/LUNG: Good inspiratory effort, clear to auscultation bilaterally with no rales, rhonchi, wheezing, or rubs. On BiPAP, able to speak in full sentences.  HEART: Regular rate and rhythm. Normal S1 and S2. No murmurs, rubs, or gallops. Chronic b/l LE pitting edema.  ABDOMEN: BSx4, soft, nontender with no rebound or guarding, nondistended. No hepatomegaly.  EXTREMITIES:  2+ peripheral pulses b/l, brisk capillary refill. No cyanosis.  NERVOUS SYSTEM:  A&Ox3, no facial asymmetry, 5/5 motor strength and intact tactile sensation in all extremities. No focal deficits.  SKIN: No rashes or lesions  PSYCH: Normal speech, normal behavior, normal affect

## 2023-01-18 NOTE — H&P ADULT - PROBLEM SELECTOR PLAN 2
- Continue home sildenafil  - Continue home Opsumit  - Diuresis as per above - Continue home sildenafil  - Continue home Opsumit  - Diuresis as per above  - Hold home aldactone for now Mild, with serum Na 147. Pt with hypervolemic hypernatremia.   - Diuresis as per above  - Monitor serum Na

## 2023-01-18 NOTE — H&P ADULT - HISTORY OF PRESENT ILLNESS
57F PMH morbid obesity, pulm HTN, prior PE on Eliquis, COPD on home oxygen 3L, OLAMIDE on nocturnal CPAP who presented with increased work of breathing and hypoxia. She has had cough with associated pleuritic chest pain and increased sputum for the past week. She attests this to dry air. Today, she had increased trouble with breathing, so she called EMS. She was found to be hypoxic to 60% on her home 3L. She denies fevers, chills, chest pain, N/V/D, constipation, urinary changes.    IN the ED, she was tachypneic and hypoxic. Labs w/ mild hypernatremia and bicarb 49. ABG w/ 7.32/115/31/59. She was placed on BiPAP. She received Lasix x1, Solumedrol x1, and magnesium. Repeat ABG 7.42/89/82/58. She was assessed by MICU. Admitted to medicine for further management. 57F PMH morbid obesity, pulm HTN, prior PE on Eliquis, COPD on home oxygen 3L, OLAMIDE on nocturnal BiPAP who presented with increased work of breathing and hypoxia. On Wednesday, she developed cold-like symptoms of chills, rhinorrhea, and cough w/ increased sputum production. Since then, she has had increased shortness of breath, inability to lie flat, weight gain, and episodes of forgetfulness. Today, she had an episode where she almost blacked out in the shower, so EMS was called. On arrival of EMS, she was hypoxic to 60% on her home 3L. She denies any measured fevers, N/V/D, constipation, urinary changes.    In the ED, she was tachypneic and hypoxic. Labs w/ mild hypernatremia and bicarb 49. ABG w/ 7.32/115/31/59. She was placed on BiPAP. EKG w/ NSR 81bpm w/o ischemic changes. CXR w/o focal opacities. POCUS w/ b/l b-lines. She received Duonebs x3, Lasix 40mg x2, Solumedrol x1, and magnesium 2g. Repeat ABG 7.42/89/82/58. She was assessed by MICU. Admitted to medicine for further management. 58 yo F with PMH of morbid obesity, HTN, pulm HTN, multiple PEs and LLE DVT (on Eliquis), COPD on home oxygen 3L NC, OLAMIDE on nocturnal BiPAP who presented with increased work of breathing and hypoxia. On Monday, she developed cold-like symptoms of chills, rhinorrhea, and cough w/ increased sputum production. Since then, she has been feeling increasingly fatigued and had increased shortness of breath, inability to lie flat, weight gain, and episodes of forgetfulness. Today, she had an episode where she almost blacked out in the shower, so EMS was called. On arrival of EMS, she was hypoxic to 60% on her home 3L NC, with difficulty breathing. She denies any measured fevers, N/V/D, constipation, melena, BRBPR, or urinary symptoms.    In the ED, she was tachypneic and hypoxic. Labs w/ mild hypernatremia and bicarb 49. ABG w/ 7.32/115/31/59. She was placed on BiPAP. EKG w/ NSR 81bpm w/o ischemic changes. CXR w/o focal opacities. POCUS w/ b/l b-lines. She received Duonebs x3, Lasix 40mg x2, Solumedrol x1, and magnesium 2g. Repeat ABG 7.42/89/82/58. She was assessed by MICU. Admitted to medicine for further management.

## 2023-01-18 NOTE — ED ADULT NURSE NOTE - OBJECTIVE STATEMENT
pt received in rm 5 AAO x 3. pt with hx of DVT/PE on Eliquis, COPD on 3L NC at home. pt c/o SOB on 3L NC at home since yesterday. pt found at home to be hypoxic to 60 % on 3L NC by EMS. pt also reports chills and dry cough that began yesterday. pt denies chest pain, n/v/d, fevers. pt placed on NRB mask sating 100%. 20g iv placed to left ac. pt placed on CM.

## 2023-01-18 NOTE — H&P ADULT - NSHPREVIEWOFSYSTEMS_GEN_ALL_CORE
Constitutional: +Fatigue and chills. No generalized weakness, fevers, or weight loss.  Eyes: No visual changes, double vision, or eye pain  Ears, Nose, Mouth, Throat: +Rhinorrhea. No sinus pain, ear pain, tinnitus, sore throat, dysphagia, or odynophagia.  Cardiovascular: +Chronic LE edema. No chest pain or palpitations.  Respiratory: +Shortness of breath and cough. No wheezing or hemoptysis.  Gastrointestinal: No abdominal pain, nausea/vomiting, diarrhea/constipation, hematemesis, melena, or BRBPR  Genitourinary: No dysuria, frequency, urgency, or hematuria  Musculoskeletal: No back pain or joint pain, swelling, or decreased ROM  Skin: No pruritus or rashes  Neurologic: No syncope, seizures, headaches, paresthesias, numbness, or limb weakness  Psychiatric: No depression, anxiety, or agitation  Endocrine: No heat/cold intolerance, mood swings, sweats, polydipsia, or polyuria  Hematologic/lymphatic: No purpura, petechia, or prolonged or excessive bleeding after dental extraction / injury  Allergic/Immunologic: No anaphylaxis or allergic response to materials, foods, animals    Positives and pertinent negatives noted and all other systems negative.

## 2023-01-18 NOTE — H&P ADULT - PROBLEM SELECTOR PLAN 6
DVT ppx: Home Eliquis  Diet: DASH/TLC  Dispo: Pending intervention DVT ppx: Home Eliquis  Diet: NPO while on BiPAP  Dispo: Pending intervention

## 2023-01-18 NOTE — ED ADULT TRIAGE NOTE - CHIEF COMPLAINT QUOTE
Pt presents to ED via EMS from home with c/o shortness of breath x 1 day. Pt has hx of COPD and wears home o2 at baseline. Per EMS upon their arrival, pt found to be hypoxic to 80%. Pt also has hx of PAH Pt presents to ED via EMS from home with c/o shortness of breath x 1 day. Pt has hx of COPD and wears home o2 at baseline. Per EMS upon their arrival, pt found to be hypoxic to 80%. Pt also has hx of PAH. Unable ot obtain BP, pt found to be hypoxic to 60% on 3LPM, pt brought straight to RM 5

## 2023-01-18 NOTE — H&P ADULT - NSHPLABSRESULTS_GEN_ALL_CORE
EKG personally reviewed.  NSR at 81 bpm, no acute ischemic changes, QTc 483 ms.    Labs personally reviewed.                        11.9   7.66  )-----------( 234      ( 19 Jan 2023 06:30 )             41.9     01-19    148<H>  |  93<L>  |  21  ----------------------------<  127<H>  3.7   |  48<HH>  |  0.99    Ca    9.6      19 Jan 2023 06:30  Phos  3.1     01-19  Mg     2.40     01-19    TPro  6.9  /  Alb  4.1  /  TBili  0.5  /  DBili  x   /  AST  21  /  ALT  46<H>  /  AlkPhos  161<H>  01-19    Imaging personally reviewed.  ACC: [id number] EXAM:  XR CHEST PORTABLE URGENT 1V                        PROCEDURE DATE:  01/18/2023      FINDINGS:  Bibasilar haziness may represent overlying soft tissues rather than pneumonia.  There is no pleural effusion or pneumothorax.  The heart is normal in size  The visualized osseous structures demonstrate no acute pathology.    IMPRESSION:  No focal consolidations.

## 2023-01-18 NOTE — ED PROVIDER NOTE - NS ED MD DISPO ISOLATION TYPES
INTERVAL HPI/OVERNIGHT EVENTS: ***    PRESSORS: [ ] YES [ ] NO  WHICH:    ANTIBIOTICS:                      Antimicrobial:  piperacillin/tazobactam IVPB.. 3.375 Gram(s) IV Intermittent every 8 hours    Cardiovascular:  tamsulosin 0.4 milliGRAM(s) Oral at bedtime    Pulmonary:    Hematalogic:  enoxaparin Injectable 40 milliGRAM(s) SubCutaneous every 24 hours    Other:  acetaminophen     Tablet .. 650 milliGRAM(s) Oral every 6 hours PRN  aluminum hydroxide/magnesium hydroxide/simethicone Suspension 30 milliLiter(s) Oral every 4 hours PRN  atorvastatin 20 milliGRAM(s) Oral at bedtime  chlorhexidine 2% Cloths 1 Application(s) Topical daily  chlorhexidine 4% Liquid 1 Application(s) Topical <User Schedule>  finasteride 5 milliGRAM(s) Oral daily  influenza  Vaccine (HIGH DOSE) 0.7 milliLiter(s) IntraMuscular once  insulin lispro (ADMELOG) corrective regimen sliding scale   SubCutaneous every 6 hours  lactated ringers. 1000 milliLiter(s) IV Continuous <Continuous>  melatonin 3 milliGRAM(s) Oral at bedtime PRN  multivitamin 1 Tablet(s) Oral daily  ondansetron Injectable 4 milliGRAM(s) IV Push every 8 hours PRN  pantoprazole    Tablet 40 milliGRAM(s) Oral before breakfast    acetaminophen     Tablet .. 650 milliGRAM(s) Oral every 6 hours PRN  aluminum hydroxide/magnesium hydroxide/simethicone Suspension 30 milliLiter(s) Oral every 4 hours PRN  atorvastatin 20 milliGRAM(s) Oral at bedtime  chlorhexidine 2% Cloths 1 Application(s) Topical daily  chlorhexidine 4% Liquid 1 Application(s) Topical <User Schedule>  enoxaparin Injectable 40 milliGRAM(s) SubCutaneous every 24 hours  finasteride 5 milliGRAM(s) Oral daily  influenza  Vaccine (HIGH DOSE) 0.7 milliLiter(s) IntraMuscular once  insulin lispro (ADMELOG) corrective regimen sliding scale   SubCutaneous every 6 hours  lactated ringers. 1000 milliLiter(s) IV Continuous <Continuous>  melatonin 3 milliGRAM(s) Oral at bedtime PRN  multivitamin 1 Tablet(s) Oral daily  ondansetron Injectable 4 milliGRAM(s) IV Push every 8 hours PRN  pantoprazole    Tablet 40 milliGRAM(s) Oral before breakfast  piperacillin/tazobactam IVPB.. 3.375 Gram(s) IV Intermittent every 8 hours  tamsulosin 0.4 milliGRAM(s) Oral at bedtime    Drug Dosing Weight  Height (cm): 177.8 (06 Oct 2022 11:23)  Weight (kg): 64.1 (06 Oct 2022 20:30)  BMI (kg/m2): 20.3 (06 Oct 2022 20:30)  BSA (m2): 1.8 (06 Oct 2022 20:30)    CENTRAL LINE: [ ] YES [ ] NO  LOCATION:   DATE INSERTED:  REMOVE: [ ] YES [ ] NO  EXPLAIN:    HERNANDEZ: [ ] YES [ ] NO    DATE INSERTED:  REMOVE:  [ ] YES [ ] NO  EXPLAIN:    A-LINE:  [ ] YES [ ] NO  LOCATION:   DATE INSERTED:  REMOVE:  [ ] YES [ ] NO  EXPLAIN:    PMH -reviewed admission note, no change since admission    ICU Vital Signs Last 24 Hrs  T(C): 36.3 (08 Oct 2022 00:00), Max: 36.7 (07 Oct 2022 07:30)  T(F): 97.4 (08 Oct 2022 00:00), Max: 98 (07 Oct 2022 07:30)  HR: 88 (08 Oct 2022 00:00) (67 - 149)  BP: 117/55 (08 Oct 2022 00:00) (90/68 - 127/63)  BP(mean): 69 (08 Oct 2022 00:00) (60 - 91)  ABP: --  ABP(mean): --  RR: 26 (08 Oct 2022 00:00) (13 - 26)  SpO2: 96% (08 Oct 2022 00:00) (95% - 100%)    O2 Parameters below as of 08 Oct 2022 00:00  Patient On (Oxygen Delivery Method): nasal cannula  O2 Flow (L/min): 2                10-06 @ 07:01  -  10-07 @ 07:00  --------------------------------------------------------  IN: 152.8 mL / OUT: 1080 mL / NET: -927.2 mL            PHYSICAL EXAM:    GENERAL: NAD, well-groomed, well-developed  HEAD:  Atraumatic, Normocephalic  EYES: EOMI, PERRLA, conjunctiva and sclera clear  ENMT: No tonsillar erythema, exudates, or enlargement; Moist mucous membranes, Good dentition, No lesions  NECK: Supple, normal appearance, No JVD; Normal thyroid; Trachea midline  NERVOUS SYSTEM:  Alert & Oriented X3, Good concentration; Motor Strength 5/5 B/L upper and lower extremities; DTRs 2+ intact and symmetric  CHEST/LUNG: No chest deformity; Normal percussion bilaterally; No rales, rhonchi, wheezing   HEART: Regular rate and rhythm; No murmurs, rubs, or gallops  ABDOMEN: Soft, Nontender, Nondistended; Bowel sounds present  EXTREMITIES:  2+ Peripheral Pulses, No clubbing, cyanosis, or edema  LYMPH: No lymphadenopathy noted  SKIN: No rashes or lesions; Good capillary refill      LABS:  CBC Full  -  ( 06 Oct 2022 12:45 )  WBC Count : 10.90 K/uL  RBC Count : 4.37 M/uL  Hemoglobin : 8.9 g/dL  Hematocrit : 26.8 %  Platelet Count - Automated : 162 K/uL  Mean Cell Volume : 61.3 fl  Mean Cell Hemoglobin : 20.4 pg  Mean Cell Hemoglobin Concentration : 33.2 gm/dL  Auto Neutrophil # : 9.35 K/uL  Auto Lymphocyte # : 0.97 K/uL  Auto Monocyte # : 0.49 K/uL  Auto Eosinophil # : 0.01 K/uL  Auto Basophil # : 0.03 K/uL  Auto Neutrophil % : 85.7 %  Auto Lymphocyte % : 8.9 %  Auto Monocyte % : 4.5 %  Auto Eosinophil % : 0.1 %  Auto Basophil % : 0.3 %    10-06    139  |  110<H>  |  13  ----------------------------<  77  4.2   |  22  |  0.94    Ca    7.8<L>      06 Oct 2022 12:45    TPro  4.9<L>  /  Alb  2.0<L>  /  TBili  1.1  /  DBili  x   /  AST  89<H>  /  ALT  31  /  AlkPhos  152<H>  10-    PT/INR - ( 06 Oct 2022 12:45 )   PT: 19.1 sec;   INR: 1.60 ratio         PTT - ( 06 Oct 2022 12:45 )  PTT:33.4 sec  Urinalysis Basic - ( 06 Oct 2022 16:20 )    Color: Yellow / Appearance: Clear / S.010 / pH: x  Gluc: x / Ketone: Trace  / Bili: Negative / Urobili: Negative   Blood: x / Protein: 15 / Nitrite: Negative   Leuk Esterase: Negative / RBC: 0-2 /HPF / WBC 0-2 /HPF   Sq Epi: x / Non Sq Epi: Occasional /HPF / Bacteria: Trace /HPF      Culture Results:   No growth (10-06 @ 16:20)  Culture Results:   No growth to date. (10-06 @ 12:50)  Culture Results:   No growth to date. (10-06 @ 12:45)      RADIOLOGY & ADDITIONAL STUDIES REVIEWED:  ***    GOALS OF CARE DISCUSSION WITH PATIENT/FAMILY/PROXY:    CRITICAL CARE TIME SPENT: 35 minutes INTERVAL HPI/OVERNIGHT EVENTS: Patient was examined at bedside, AAOx3, stable, NAD. Urine output overnight was 150 cc/hr. He got NS bolus. No other acute events overnight.    PRESSORS: [ ] YES [ ] NO  WHICH:    ANTIBIOTICS:                      Antimicrobial:  piperacillin/tazobactam IVPB.. 3.375 Gram(s) IV Intermittent every 8 hours    Cardiovascular:  tamsulosin 0.4 milliGRAM(s) Oral at bedtime    Pulmonary:    Hematalogic:  enoxaparin Injectable 40 milliGRAM(s) SubCutaneous every 24 hours    Other:  acetaminophen     Tablet .. 650 milliGRAM(s) Oral every 6 hours PRN  aluminum hydroxide/magnesium hydroxide/simethicone Suspension 30 milliLiter(s) Oral every 4 hours PRN  atorvastatin 20 milliGRAM(s) Oral at bedtime  chlorhexidine 2% Cloths 1 Application(s) Topical daily  chlorhexidine 4% Liquid 1 Application(s) Topical <User Schedule>  finasteride 5 milliGRAM(s) Oral daily  influenza  Vaccine (HIGH DOSE) 0.7 milliLiter(s) IntraMuscular once  insulin lispro (ADMELOG) corrective regimen sliding scale   SubCutaneous every 6 hours  lactated ringers. 1000 milliLiter(s) IV Continuous <Continuous>  melatonin 3 milliGRAM(s) Oral at bedtime PRN  multivitamin 1 Tablet(s) Oral daily  ondansetron Injectable 4 milliGRAM(s) IV Push every 8 hours PRN  pantoprazole    Tablet 40 milliGRAM(s) Oral before breakfast    acetaminophen     Tablet .. 650 milliGRAM(s) Oral every 6 hours PRN  aluminum hydroxide/magnesium hydroxide/simethicone Suspension 30 milliLiter(s) Oral every 4 hours PRN  atorvastatin 20 milliGRAM(s) Oral at bedtime  chlorhexidine 2% Cloths 1 Application(s) Topical daily  chlorhexidine 4% Liquid 1 Application(s) Topical <User Schedule>  enoxaparin Injectable 40 milliGRAM(s) SubCutaneous every 24 hours  finasteride 5 milliGRAM(s) Oral daily  influenza  Vaccine (HIGH DOSE) 0.7 milliLiter(s) IntraMuscular once  insulin lispro (ADMELOG) corrective regimen sliding scale   SubCutaneous every 6 hours  lactated ringers. 1000 milliLiter(s) IV Continuous <Continuous>  melatonin 3 milliGRAM(s) Oral at bedtime PRN  multivitamin 1 Tablet(s) Oral daily  ondansetron Injectable 4 milliGRAM(s) IV Push every 8 hours PRN  pantoprazole    Tablet 40 milliGRAM(s) Oral before breakfast  piperacillin/tazobactam IVPB.. 3.375 Gram(s) IV Intermittent every 8 hours  tamsulosin 0.4 milliGRAM(s) Oral at bedtime    Drug Dosing Weight  Height (cm): 177.8 (06 Oct 2022 11:23)  Weight (kg): 64.1 (06 Oct 2022 20:30)  BMI (kg/m2): 20.3 (06 Oct 2022 20:30)  BSA (m2): 1.8 (06 Oct 2022 20:30)    CENTRAL LINE: [ ] YES [ ] NO  LOCATION:   DATE INSERTED:  REMOVE: [ ] YES [ ] NO  EXPLAIN:    HERNANDEZ: [ ] YES [ ] NO    DATE INSERTED:  REMOVE:  [ ] YES [ ] NO  EXPLAIN:    A-LINE:  [ ] YES [ ] NO  LOCATION:   DATE INSERTED:  REMOVE:  [ ] YES [ ] NO  EXPLAIN:    PMH -reviewed admission note, no change since admission    ICU Vital Signs Last 24 Hrs  T(C): 36.3 (08 Oct 2022 00:00), Max: 36.7 (07 Oct 2022 07:30)  T(F): 97.4 (08 Oct 2022 00:00), Max: 98 (07 Oct 2022 07:30)  HR: 88 (08 Oct 2022 00:00) (67 - 149)  BP: 117/55 (08 Oct 2022 00:00) (90/68 - 127/63)  BP(mean): 69 (08 Oct 2022 00:00) (60 - 91)  ABP: --  ABP(mean): --  RR: 26 (08 Oct 2022 00:00) (13 - 26)  SpO2: 96% (08 Oct 2022 00:00) (95% - 100%)    O2 Parameters below as of 08 Oct 2022 00:00  Patient On (Oxygen Delivery Method): nasal cannula  O2 Flow (L/min): 2                10-06 @ 07:01  -  10-07 @ 07:00  --------------------------------------------------------  IN: 152.8 mL / OUT: 1080 mL / NET: -927.2 mL            PHYSICAL EXAM:    GENERAL: NAD, well-groomed, well-developed  HEAD:  Atraumatic, Normocephalic  EYES: EOMI, PERRLA, conjunctiva and sclera clear  ENMT: No tonsillar erythema, exudates, or enlargement; Moist mucous membranes, Good dentition, No lesions  NECK: Supple, normal appearance, No JVD; Normal thyroid; Trachea midline  NERVOUS SYSTEM:  Alert & Oriented X3, Good concentration; Motor Strength 5/5 B/L upper and lower extremities; DTRs 2+ intact and symmetric  CHEST/LUNG: No chest deformity; Normal percussion bilaterally; No rales, rhonchi, wheezing   HEART: Regular rate and rhythm; No murmurs, rubs, or gallops  ABDOMEN: Soft, Nontender, Nondistended; Bowel sounds present  EXTREMITIES:  2+ Peripheral Pulses, No clubbing, cyanosis, or edema  LYMPH: No lymphadenopathy noted  SKIN: No rashes or lesions; Good capillary refill      LABS:  CBC Full  -  ( 06 Oct 2022 12:45 )  WBC Count : 10.90 K/uL  RBC Count : 4.37 M/uL  Hemoglobin : 8.9 g/dL  Hematocrit : 26.8 %  Platelet Count - Automated : 162 K/uL  Mean Cell Volume : 61.3 fl  Mean Cell Hemoglobin : 20.4 pg  Mean Cell Hemoglobin Concentration : 33.2 gm/dL  Auto Neutrophil # : 9.35 K/uL  Auto Lymphocyte # : 0.97 K/uL  Auto Monocyte # : 0.49 K/uL  Auto Eosinophil # : 0.01 K/uL  Auto Basophil # : 0.03 K/uL  Auto Neutrophil % : 85.7 %  Auto Lymphocyte % : 8.9 %  Auto Monocyte % : 4.5 %  Auto Eosinophil % : 0.1 %  Auto Basophil % : 0.3 %    10    139  |  110<H>  |  13  ----------------------------<  77  4.2   |  22  |  0.94    Ca    7.8<L>      06 Oct 2022 12:45    TPro  4.9<L>  /  Alb  2.0<L>  /  TBili  1.1  /  DBili  x   /  AST  89<H>  /  ALT  31  /  AlkPhos  152<H>  10-    PT/INR - ( 06 Oct 2022 12:45 )   PT: 19.1 sec;   INR: 1.60 ratio         PTT - ( 06 Oct 2022 12:45 )  PTT:33.4 sec  Urinalysis Basic - ( 06 Oct 2022 16:20 )    Color: Yellow / Appearance: Clear / S.010 / pH: x  Gluc: x / Ketone: Trace  / Bili: Negative / Urobili: Negative   Blood: x / Protein: 15 / Nitrite: Negative   Leuk Esterase: Negative / RBC: 0-2 /HPF / WBC 0-2 /HPF   Sq Epi: x / Non Sq Epi: Occasional /HPF / Bacteria: Trace /HPF      Culture Results:   No growth (10-06 @ 16:20)  Culture Results:   No growth to date. (10-06 @ 12:50)  Culture Results:   No growth to date. (10-06 @ 12:45)      RADIOLOGY & ADDITIONAL STUDIES REVIEWED:  ***    GOALS OF CARE DISCUSSION WITH PATIENT/FAMILY/PROXY:    CRITICAL CARE TIME SPENT: 35 minutes INTERVAL HPI/OVERNIGHT EVENTS: Patient was examined at bedside, AAOx3, stable, NAD. Urine output overnight was low. He was started on Tamsulonsin and Finasteride. He got NS bolus. No other acute events overnight.    PRESSORS: [ ] YES [ ] NO  WHICH:    ANTIBIOTICS:                      Antimicrobial:  piperacillin/tazobactam IVPB.. 3.375 Gram(s) IV Intermittent every 8 hours    Cardiovascular:  tamsulosin 0.4 milliGRAM(s) Oral at bedtime    Pulmonary:    Hematalogic:  enoxaparin Injectable 40 milliGRAM(s) SubCutaneous every 24 hours    Other:  acetaminophen     Tablet .. 650 milliGRAM(s) Oral every 6 hours PRN  aluminum hydroxide/magnesium hydroxide/simethicone Suspension 30 milliLiter(s) Oral every 4 hours PRN  atorvastatin 20 milliGRAM(s) Oral at bedtime  chlorhexidine 2% Cloths 1 Application(s) Topical daily  chlorhexidine 4% Liquid 1 Application(s) Topical <User Schedule>  finasteride 5 milliGRAM(s) Oral daily  influenza  Vaccine (HIGH DOSE) 0.7 milliLiter(s) IntraMuscular once  insulin lispro (ADMELOG) corrective regimen sliding scale   SubCutaneous every 6 hours  lactated ringers. 1000 milliLiter(s) IV Continuous <Continuous>  melatonin 3 milliGRAM(s) Oral at bedtime PRN  multivitamin 1 Tablet(s) Oral daily  ondansetron Injectable 4 milliGRAM(s) IV Push every 8 hours PRN  pantoprazole    Tablet 40 milliGRAM(s) Oral before breakfast    acetaminophen     Tablet .. 650 milliGRAM(s) Oral every 6 hours PRN  aluminum hydroxide/magnesium hydroxide/simethicone Suspension 30 milliLiter(s) Oral every 4 hours PRN  atorvastatin 20 milliGRAM(s) Oral at bedtime  chlorhexidine 2% Cloths 1 Application(s) Topical daily  chlorhexidine 4% Liquid 1 Application(s) Topical <User Schedule>  enoxaparin Injectable 40 milliGRAM(s) SubCutaneous every 24 hours  finasteride 5 milliGRAM(s) Oral daily  influenza  Vaccine (HIGH DOSE) 0.7 milliLiter(s) IntraMuscular once  insulin lispro (ADMELOG) corrective regimen sliding scale   SubCutaneous every 6 hours  lactated ringers. 1000 milliLiter(s) IV Continuous <Continuous>  melatonin 3 milliGRAM(s) Oral at bedtime PRN  multivitamin 1 Tablet(s) Oral daily  ondansetron Injectable 4 milliGRAM(s) IV Push every 8 hours PRN  pantoprazole    Tablet 40 milliGRAM(s) Oral before breakfast  piperacillin/tazobactam IVPB.. 3.375 Gram(s) IV Intermittent every 8 hours  tamsulosin 0.4 milliGRAM(s) Oral at bedtime    Drug Dosing Weight  Height (cm): 177.8 (06 Oct 2022 11:23)  Weight (kg): 64.1 (06 Oct 2022 20:30)  BMI (kg/m2): 20.3 (06 Oct 2022 20:30)  BSA (m2): 1.8 (06 Oct 2022 20:30)    CENTRAL LINE: [ ] YES [ ] NO  LOCATION:   DATE INSERTED:  REMOVE: [ ] YES [ ] NO  EXPLAIN:    HERNANDEZ: [ ] YES [ ] NO    DATE INSERTED:  REMOVE:  [ ] YES [ ] NO  EXPLAIN:    A-LINE:  [ ] YES [ ] NO  LOCATION:   DATE INSERTED:  REMOVE:  [ ] YES [ ] NO  EXPLAIN:    PMH -reviewed admission note, no change since admission    ICU Vital Signs Last 24 Hrs  T(C): 36.3 (08 Oct 2022 00:00), Max: 36.7 (07 Oct 2022 07:30)  T(F): 97.4 (08 Oct 2022 00:00), Max: 98 (07 Oct 2022 07:30)  HR: 88 (08 Oct 2022 00:00) (67 - 149)  BP: 117/55 (08 Oct 2022 00:00) (90/68 - 127/63)  BP(mean): 69 (08 Oct 2022 00:00) (60 - 91)  ABP: --  ABP(mean): --  RR: 26 (08 Oct 2022 00:00) (13 - 26)  SpO2: 96% (08 Oct 2022 00:00) (95% - 100%)    O2 Parameters below as of 08 Oct 2022 00:00  Patient On (Oxygen Delivery Method): nasal cannula  O2 Flow (L/min): 2                10-06 @ 07:01  -  10-07 @ 07:00  --------------------------------------------------------  IN: 152.8 mL / OUT: 1080 mL / NET: -927.2 mL            PHYSICAL EXAM:    GENERAL: NAD, well-groomed, well-developed  HEAD:  Atraumatic, Normocephalic  EYES: EOMI, PERRLA, conjunctiva and sclera clear  ENMT: No tonsillar erythema, exudates, or enlargement; Moist mucous membranes, Good dentition, No lesions  NECK: Supple, normal appearance, No JVD; Normal thyroid; Trachea midline  NERVOUS SYSTEM:  Alert & Oriented X3, Good concentration; Motor Strength 5/5 B/L upper and lower extremities; DTRs 2+ intact and symmetric  CHEST/LUNG: No chest deformity; Normal percussion bilaterally; No rales, rhonchi, wheezing   HEART: Regular rate and rhythm; No murmurs, rubs, or gallops  ABDOMEN: Soft, Nontender, Nondistended; Bowel sounds present  EXTREMITIES:  2+ Peripheral Pulses, No clubbing, cyanosis, or edema  LYMPH: No lymphadenopathy noted  SKIN: No rashes or lesions; Good capillary refill      LABS:  CBC Full  -  ( 06 Oct 2022 12:45 )  WBC Count : 10.90 K/uL  RBC Count : 4.37 M/uL  Hemoglobin : 8.9 g/dL  Hematocrit : 26.8 %  Platelet Count - Automated : 162 K/uL  Mean Cell Volume : 61.3 fl  Mean Cell Hemoglobin : 20.4 pg  Mean Cell Hemoglobin Concentration : 33.2 gm/dL  Auto Neutrophil # : 9.35 K/uL  Auto Lymphocyte # : 0.97 K/uL  Auto Monocyte # : 0.49 K/uL  Auto Eosinophil # : 0.01 K/uL  Auto Basophil # : 0.03 K/uL  Auto Neutrophil % : 85.7 %  Auto Lymphocyte % : 8.9 %  Auto Monocyte % : 4.5 %  Auto Eosinophil % : 0.1 %  Auto Basophil % : 0.3 %    10-06    139  |  110<H>  |  13  ----------------------------<  77  4.2   |  22  |  0.94    Ca    7.8<L>      06 Oct 2022 12:45    TPro  4.9<L>  /  Alb  2.0<L>  /  TBili  1.1  /  DBili  x   /  AST  89<H>  /  ALT  31  /  AlkPhos  152<H>  10-06    PT/INR - ( 06 Oct 2022 12:45 )   PT: 19.1 sec;   INR: 1.60 ratio         PTT - ( 06 Oct 2022 12:45 )  PTT:33.4 sec  Urinalysis Basic - ( 06 Oct 2022 16:20 )    Color: Yellow / Appearance: Clear / S.010 / pH: x  Gluc: x / Ketone: Trace  / Bili: Negative / Urobili: Negative   Blood: x / Protein: 15 / Nitrite: Negative   Leuk Esterase: Negative / RBC: 0-2 /HPF / WBC 0-2 /HPF   Sq Epi: x / Non Sq Epi: Occasional /HPF / Bacteria: Trace /HPF      Culture Results:   No growth (10-06 @ 16:20)  Culture Results:   No growth to date. (10-06 @ 12:50)  Culture Results:   No growth to date. (10-06 @ 12:45)      RADIOLOGY & ADDITIONAL STUDIES REVIEWED:  ***    GOALS OF CARE DISCUSSION WITH PATIENT/FAMILY/PROXY:    CRITICAL CARE TIME SPENT: 35 minutes INTERVAL HPI/OVERNIGHT EVENTS: Patient was examined at bedside, AAOx3, stable, NAD. Urine output overnight was low. He was started on Tamsulonsin and Finasteride. He got NS bolus. No other acute events overnight.    PRESSORS: [ ] YES [ ] NO  WHICH:    ANTIBIOTICS:                      Antimicrobial:  piperacillin/tazobactam IVPB.. 3.375 Gram(s) IV Intermittent every 8 hours    Cardiovascular:  tamsulosin 0.4 milliGRAM(s) Oral at bedtime    Pulmonary:    Hematalogic:  enoxaparin Injectable 40 milliGRAM(s) SubCutaneous every 24 hours    Other:  acetaminophen     Tablet .. 650 milliGRAM(s) Oral every 6 hours PRN  aluminum hydroxide/magnesium hydroxide/simethicone Suspension 30 milliLiter(s) Oral every 4 hours PRN  atorvastatin 20 milliGRAM(s) Oral at bedtime  chlorhexidine 2% Cloths 1 Application(s) Topical daily  chlorhexidine 4% Liquid 1 Application(s) Topical <User Schedule>  finasteride 5 milliGRAM(s) Oral daily  influenza  Vaccine (HIGH DOSE) 0.7 milliLiter(s) IntraMuscular once  insulin lispro (ADMELOG) corrective regimen sliding scale   SubCutaneous every 6 hours  lactated ringers. 1000 milliLiter(s) IV Continuous <Continuous>  melatonin 3 milliGRAM(s) Oral at bedtime PRN  multivitamin 1 Tablet(s) Oral daily  ondansetron Injectable 4 milliGRAM(s) IV Push every 8 hours PRN  pantoprazole    Tablet 40 milliGRAM(s) Oral before breakfast    acetaminophen     Tablet .. 650 milliGRAM(s) Oral every 6 hours PRN  aluminum hydroxide/magnesium hydroxide/simethicone Suspension 30 milliLiter(s) Oral every 4 hours PRN  atorvastatin 20 milliGRAM(s) Oral at bedtime  chlorhexidine 2% Cloths 1 Application(s) Topical daily  chlorhexidine 4% Liquid 1 Application(s) Topical <User Schedule>  enoxaparin Injectable 40 milliGRAM(s) SubCutaneous every 24 hours  finasteride 5 milliGRAM(s) Oral daily  influenza  Vaccine (HIGH DOSE) 0.7 milliLiter(s) IntraMuscular once  insulin lispro (ADMELOG) corrective regimen sliding scale   SubCutaneous every 6 hours  lactated ringers. 1000 milliLiter(s) IV Continuous <Continuous>  melatonin 3 milliGRAM(s) Oral at bedtime PRN  multivitamin 1 Tablet(s) Oral daily  ondansetron Injectable 4 milliGRAM(s) IV Push every 8 hours PRN  pantoprazole    Tablet 40 milliGRAM(s) Oral before breakfast  piperacillin/tazobactam IVPB.. 3.375 Gram(s) IV Intermittent every 8 hours  tamsulosin 0.4 milliGRAM(s) Oral at bedtime    Drug Dosing Weight  Height (cm): 177.8 (06 Oct 2022 11:23)  Weight (kg): 64.1 (06 Oct 2022 20:30)  BMI (kg/m2): 20.3 (06 Oct 2022 20:30)  BSA (m2): 1.8 (06 Oct 2022 20:30)    CENTRAL LINE: [ ] YES [ ] NO  LOCATION:   DATE INSERTED:  REMOVE: [ ] YES [ ] NO  EXPLAIN:    HERNANDEZ: [ ] YES [ ] NO    DATE INSERTED:  REMOVE:  [ ] YES [ ] NO  EXPLAIN:    A-LINE:  [ ] YES [ ] NO  LOCATION:   DATE INSERTED:  REMOVE:  [ ] YES [ ] NO  EXPLAIN:    PMH -reviewed admission note, no change since admission    ICU Vital Signs Last 24 Hrs  T(C): 36.3 (08 Oct 2022 00:00), Max: 36.7 (07 Oct 2022 07:30)  T(F): 97.4 (08 Oct 2022 00:00), Max: 98 (07 Oct 2022 07:30)  HR: 88 (08 Oct 2022 00:00) (67 - 149)  BP: 117/55 (08 Oct 2022 00:00) (90/68 - 127/63)  BP(mean): 69 (08 Oct 2022 00:00) (60 - 91)  ABP: --  ABP(mean): --  RR: 26 (08 Oct 2022 00:00) (13 - 26)  SpO2: 96% (08 Oct 2022 00:00) (95% - 100%)    O2 Parameters below as of 08 Oct 2022 00:00  Patient On (Oxygen Delivery Method): nasal cannula  O2 Flow (L/min): 2                10-06 @ 07:01  -  10-07 @ 07:00  --------------------------------------------------------  IN: 152.8 mL / OUT: 1080 mL / NET: -927.2 mL            PHYSICAL EXAM:    GENERAL: No acute distress   HEAD:  Atraumatic, Normocephalic  EYES: EOMI, PERRLA, conjunctiva and sclera clear  ENMT: No tonsillar erythema, exudates, or enlargement; Moist mucous membranes  NECK: Supple, No JVD, Normal thyroid  HEART: Regular rate and rhythm; No murmurs, rubs, or gallops  RESPIRATORY: rales right lung base, No W/R/R  ABDOMEN: Soft, Nontender, Nondistended; Bowel sounds present  NEUROLOGY: A&Ox2(place and name), nonfocal, moving all extremities  EXTREMITIES:  2+ Peripheral Pulses, 1+ PE B/l LE  SKIN: warm, dry, normal color, no rash or abnormal lesions      LABS:  CBC Full  -  ( 06 Oct 2022 12:45 )  WBC Count : 10.90 K/uL  RBC Count : 4.37 M/uL  Hemoglobin : 8.9 g/dL  Hematocrit : 26.8 %  Platelet Count - Automated : 162 K/uL  Mean Cell Volume : 61.3 fl  Mean Cell Hemoglobin : 20.4 pg  Mean Cell Hemoglobin Concentration : 33.2 gm/dL  Auto Neutrophil # : 9.35 K/uL  Auto Lymphocyte # : 0.97 K/uL  Auto Monocyte # : 0.49 K/uL  Auto Eosinophil # : 0.01 K/uL  Auto Basophil # : 0.03 K/uL  Auto Neutrophil % : 85.7 %  Auto Lymphocyte % : 8.9 %  Auto Monocyte % : 4.5 %  Auto Eosinophil % : 0.1 %  Auto Basophil % : 0.3 %    10-06    139  |  110<H>  |  13  ----------------------------<  77  4.2   |  22  |  0.94    Ca    7.8<L>      06 Oct 2022 12:45    TPro  4.9<L>  /  Alb  2.0<L>  /  TBili  1.1  /  DBili  x   /  AST  89<H>  /  ALT  31  /  AlkPhos  152<H>  10    PT/INR - ( 06 Oct 2022 12:45 )   PT: 19.1 sec;   INR: 1.60 ratio         PTT - ( 06 Oct 2022 12:45 )  PTT:33.4 sec  Urinalysis Basic - ( 06 Oct 2022 16:20 )    Color: Yellow / Appearance: Clear / S.010 / pH: x  Gluc: x / Ketone: Trace  / Bili: Negative / Urobili: Negative   Blood: x / Protein: 15 / Nitrite: Negative   Leuk Esterase: Negative / RBC: 0-2 /HPF / WBC 0-2 /HPF   Sq Epi: x / Non Sq Epi: Occasional /HPF / Bacteria: Trace /HPF      Culture Results:   No growth (10-06 @ 16:20)  Culture Results:   No growth to date. (10-06 @ 12:50)  Culture Results:   No growth to date. (10-06 @ 12:45)      RADIOLOGY & ADDITIONAL STUDIES REVIEWED:  ***    GOALS OF CARE DISCUSSION WITH PATIENT/FAMILY/PROXY:    CRITICAL CARE TIME SPENT: 35 minutes None

## 2023-01-18 NOTE — ED PROVIDER NOTE - OBJECTIVE STATEMENT
58 y/o F with PMH morbid obesity, Pulm HTN, PE on eliquis, COPD on home oxygen 3L, OLAMIDE p/w increased WOB and hypoxia. pt reports having increased trouble with breathing and ems found her to be hypoxic to 60% on her 3L. Pt states she has been having shortness of breath since last night. has not taken any nebulizers today. denies fever, chills, chest pain. She follows with dr. duong for pulm htn. uses a bipap for copd. pt noted to have teachypnea w/ dimished bs and prolonged expiratory phase.

## 2023-01-18 NOTE — H&P ADULT - PROBLEM SELECTOR PLAN 4
- Continue home losartan  - Continue home amlodipine - Telemetry w/ continuous pulse ox  - Continue home sildenafil  - Continue home Opsumit  - Diuresis as per above  - Obtain TTE  - Hold home aldactone for now while being diuresed with IV Lasix as above

## 2023-01-18 NOTE — ED PROVIDER NOTE - ATTENDING CONTRIBUTION TO CARE
attending wrote note  Dr. Currie: I have personally seen and examined this patient at the bedside. I have fully participated in the care of this patient. I have reviewed all pertinent clinical information, including history, physical exam, plan and the Resident's note and agree except as noted. HPI above as by me. PE above as by me. DDX PLAN

## 2023-01-18 NOTE — H&P ADULT - PROBLEM SELECTOR PLAN 5
- Continue home Eliquis , ALT 61, AST 32  Possibly i/s/o hepatic congestion from ADHF  - Trend CMP  - If liver enzymes remain elevated, can conduct further work up with CT or US imaging

## 2023-01-18 NOTE — ED ADULT NURSE NOTE - CHIEF COMPLAINT QUOTE
Pt presents to ED via EMS from home with c/o shortness of breath x 1 day. Pt has hx of COPD and wears home o2 at baseline. Per EMS upon their arrival, pt found to be hypoxic to 80%. Pt also has hx of PAH. Unable ot obtain BP, pt found to be hypoxic to 60% on 3LPM, pt brought straight to RM 5

## 2023-01-18 NOTE — H&P ADULT - PROBLEM SELECTOR PLAN 3
, ALT 61, AST 32  Possibly i/s/o hepatic congestion from ADHF  - Trend CMP  - If liver enzymes remain elevated, can conduct further work up BPs in acceptable range on admission despite possible ADHF.  - Continue home losartan  - Continue home amlodipine

## 2023-01-18 NOTE — H&P ADULT - ASSESSMENT
57F PMH morbid obesity, pulm HTN, prior PE on Eliquis, COPD on home oxygen 3L, OLAIMDE on nocturnal CPAP who presented with increased work of breathing and hypoxia. 57F PMH morbid obesity, pulm HTN, prior PE on Eliquis, COPD on home oxygen 3L, OLAMIDE on nocturnal BiPAP who presented with acute on chronic hypoxemic & hypercapnic respiratory failure, likely i/s/o chronic OHS, OLAMIDE, pulmonary hypertension, COPD. 57F PMH morbid obesity, HTN, pulm HTN, multiple PEs and LLE DVT on Eliquis, COPD on home oxygen 3L NC, OLAMIDE on nocturnal BiPAP who presented with acute on chronic hypoxemic & hypercapnic respiratory failure, likely i/s/o chronic OHS, OLAMIDE, pulmonary hypertension, COPD.

## 2023-01-18 NOTE — ED PROVIDER NOTE - PHYSICAL EXAMINATION
Gen: Awake, Alert, WD, WN, NAD  Head:  NC/AT  Eyes:  PERRL, EOMI, Conjunctiva pink, lids normal, no scleral icterus  ENT: OP clear, , moist mucus membranes  Neck: supple, nontender, no meningismus, no JVD, trachea midline  Cardiac/CV:  S1 S2, RRR, no M/G/R  Respiratory/Pulm: dimished bs w/  prolonged expiratory phase   Gastrointestinal/Abdomen:  Soft, Obese  nondistended, +BS, no rebound/guarding  Back:  no CVAT, no MLT  Ext:  warm, well perfused, moving all extremities spontaneously, no peripheral edema, distal pulses intact  Skin: intact, no rash  Neuro:  AAOx3, sensation intact, motor 5/5 x 4 extremities,  speech clear short phrases

## 2023-01-18 NOTE — H&P ADULT - PROBLEM SELECTOR PLAN 1
Patient w/ history of OLAMIDE/OHS, COPD, pulmonary hypertension who presented w/ VIERA, orthopnea & fogginess  Hypoxic to 60% on arrival of EMS, ABG 7/32/115/31 on admission, placed on BiPAP  CXR w/ clear lungs, pocus w/ b/l b-lines  Likely i/s/o ADHF 2/2 pulmonary hypertension vs. worsening pHTN  - Continue Lasix 80mg IV   - Strict Is/Os  - Daily weights  - Continue BiPAP 16/8 prn & qhs  - Patient w/ history of OLAMIDE/OHS, COPD, pulmonary hypertension who presented w/ VIERA, orthopnea & fogginess  Hypoxic to 60% on arrival of EMS, ABG 7/32/115/31 on admission, placed on BiPAP, rpt ABG 7.42/89/82  CXR w/ clear lungs, pocus w/ b/l b-lines  Likely i/s/o ADHF 2/2 pulmonary hypertension vs. worsening pHTN  - Telemetry w/ continuous pulse ox  - Continue Lasix 80mg IV bid  - Strict Is/Os  - Daily weights  - Continue BiPAP 16/8 prn & qhs  - Duonebs q6hr standing for now  - Pulmonology to see in AM, appreciate recs Patient w/ history of OLAMIDE/OHS, COPD, pulmonary hypertension who presented w/ VIERA, orthopnea & fogginess  Hypoxic to 60% on arrival of EMS, ABG 7/32/115/31 on admission, placed on BiPAP, rpt ABG 7.42/89/82  CXR w/ clear lungs, pocus w/ b/l b-lines  Likely i/s/o ADHF 2/2 pulmonary hypertension vs. worsening pHTN  - Telemetry w/ continuous pulse ox  - Continue Lasix 80mg IV qd  - Strict Is/Os  - Daily weights  - Continue BiPAP 16/8 prn & qhs  - Duonebs q6hr standing for now  - Pulmonology to see in AM, appreciate recs Patient w/ history of OLAMIDE/OHS, COPD, pulmonary hypertension who presented w/ VIERA, orthopnea & fogginess  Hypoxic to 60% on arrival of EMS, ABG 7/32/115/31 on admission, placed on BiPAP, rpt ABG 7.42/89/82  CXR w/ clear lungs, pocus w/ b/l b-lines  Likely i/s/o ADHF 2/2 pulmonary hypertension vs. worsening pHTN  - Telemetry w/ continuous pulse ox  - Continue Lasix 40mg IV bid  - Strict Is/Os  - Daily weights  - TTE  - Continue BiPAP 16/8 prn & qhs  - Duonebs q6hr standing for now  - Pulmonology to see in AM, appreciate recs  - CTA Chest to r/o PE Patient w/ history of OLAMIDE/OHS, COPD, pulmonary hypertension who presented w/ VIERA, orthopnea, & fogginess/near syncope.   Hypoxic to 60% upon EMS arrival in the field, VBG with pH 7.32 and pCO2 115 on admission, placed on BiPAP, ABG later on with pH 7.42 and pCO2 89.   CXR w/ clear lungs, POCUS w/ b/l B-lines  Likely i/s/o ADHF 2/2 pulmonary hypertension vs. worsening pHTN, though can also be from acute PE given pt with history of multiple PEs, including 2 with no clear inciting cause.   - Telemetry w/ continuous pulse ox  - S/p IV Lasix 40 mg x2 in ED. Continue IV Lasix 40 mg BID for now.  - Strict Is/Os  - Daily weights  - Obtain TTE  - Check CTA chest with IV contrast to r/o PE  - Continue BiPAP 16/8 PRN & qhs  - Duonebs q6hrs standing for now  - Fogginess and near syncope likely from severe hypercapnea. Repeat VBG in AM to trend pCO2.  - MICU consult obtained on admission, appreciate recs. Pulmonology to see pt in AM.

## 2023-01-18 NOTE — ED ADULT NURSE NOTE - INTERVENTIONS DEFINITIONS
[place] to call system/Call bell, personal items and telephone within reach/Instruct patient to call for assistance/Non-slip footwear when patient is off stretcher/Physically safe environment: no spills, clutter or unnecessary equipment/Stretcher in lowest position, wheels locked, appropriate side rails in place/Monitor gait and stability/Review medications for side effects contributing to fall risk/Reinforce activity limits and safety measures with patient and family

## 2023-01-18 NOTE — H&P ADULT - CONVERSATION DETAILS
Goals of care discussed at length with patient and one of her daughters at bedside. This conversation included explanation of current condition, possible prognosis, potential options for therapy, and code status. Patient expressed understanding of her current clinical condition, including the reason she was placed on NPPV with BiPAP. When code status was addressed with patient, patient was able to display full understanding of what CPR and intubation entailed and stated that she would want chest compressions and intubation if her condition deteriorated to the point that she required such measures. Patient is full code. Length of conversation ~16 minutes.

## 2023-01-19 DIAGNOSIS — E87.0 HYPEROSMOLALITY AND HYPERNATREMIA: ICD-10-CM

## 2023-01-19 DIAGNOSIS — G47.33 OBSTRUCTIVE SLEEP APNEA (ADULT) (PEDIATRIC): ICD-10-CM

## 2023-01-19 LAB
ALBUMIN SERPL ELPH-MCNC: 4.1 G/DL — SIGNIFICANT CHANGE UP (ref 3.3–5)
ALP SERPL-CCNC: 161 U/L — HIGH (ref 40–120)
ALT FLD-CCNC: 46 U/L — HIGH (ref 4–33)
ANION GAP SERPL CALC-SCNC: 7 MMOL/L — SIGNIFICANT CHANGE UP (ref 7–14)
AST SERPL-CCNC: 21 U/L — SIGNIFICANT CHANGE UP (ref 4–32)
BILIRUB SERPL-MCNC: 0.5 MG/DL — SIGNIFICANT CHANGE UP (ref 0.2–1.2)
BUN SERPL-MCNC: 21 MG/DL — SIGNIFICANT CHANGE UP (ref 7–23)
CALCIUM SERPL-MCNC: 9.6 MG/DL — SIGNIFICANT CHANGE UP (ref 8.4–10.5)
CHLORIDE SERPL-SCNC: 93 MMOL/L — LOW (ref 98–107)
CO2 SERPL-SCNC: 48 MMOL/L — CRITICAL HIGH (ref 22–31)
CREAT SERPL-MCNC: 0.99 MG/DL — SIGNIFICANT CHANGE UP (ref 0.5–1.3)
EGFR: 67 ML/MIN/1.73M2 — SIGNIFICANT CHANGE UP
GAS PNL BLDV: SIGNIFICANT CHANGE UP
GLUCOSE SERPL-MCNC: 127 MG/DL — HIGH (ref 70–99)
HCT VFR BLD CALC: 41.9 % — SIGNIFICANT CHANGE UP (ref 34.5–45)
HGB BLD-MCNC: 11.9 G/DL — SIGNIFICANT CHANGE UP (ref 11.5–15.5)
MAGNESIUM SERPL-MCNC: 2.4 MG/DL — SIGNIFICANT CHANGE UP (ref 1.6–2.6)
MCHC RBC-ENTMCNC: 27.8 PG — SIGNIFICANT CHANGE UP (ref 27–34)
MCHC RBC-ENTMCNC: 28.4 GM/DL — LOW (ref 32–36)
MCV RBC AUTO: 97.9 FL — SIGNIFICANT CHANGE UP (ref 80–100)
NRBC # BLD: 0 /100 WBCS — SIGNIFICANT CHANGE UP (ref 0–0)
NRBC # FLD: 0.04 K/UL — HIGH (ref 0–0)
PHOSPHATE SERPL-MCNC: 3.1 MG/DL — SIGNIFICANT CHANGE UP (ref 2.5–4.5)
PLATELET # BLD AUTO: 234 K/UL — SIGNIFICANT CHANGE UP (ref 150–400)
POTASSIUM SERPL-MCNC: 3.7 MMOL/L — SIGNIFICANT CHANGE UP (ref 3.5–5.3)
POTASSIUM SERPL-SCNC: 3.7 MMOL/L — SIGNIFICANT CHANGE UP (ref 3.5–5.3)
PROT SERPL-MCNC: 6.9 G/DL — SIGNIFICANT CHANGE UP (ref 6–8.3)
RBC # BLD: 4.28 M/UL — SIGNIFICANT CHANGE UP (ref 3.8–5.2)
RBC # FLD: 14.1 % — SIGNIFICANT CHANGE UP (ref 10.3–14.5)
SODIUM SERPL-SCNC: 148 MMOL/L — HIGH (ref 135–145)
WBC # BLD: 7.66 K/UL — SIGNIFICANT CHANGE UP (ref 3.8–10.5)
WBC # FLD AUTO: 7.66 K/UL — SIGNIFICANT CHANGE UP (ref 3.8–10.5)

## 2023-01-19 PROCEDURE — 99223 1ST HOSP IP/OBS HIGH 75: CPT | Mod: GC

## 2023-01-19 PROCEDURE — 71275 CT ANGIOGRAPHY CHEST: CPT | Mod: 26

## 2023-01-19 PROCEDURE — 99233 SBSQ HOSP IP/OBS HIGH 50: CPT

## 2023-01-19 RX ORDER — FUROSEMIDE 40 MG
40 TABLET ORAL DAILY
Refills: 0 | Status: DISCONTINUED | OUTPATIENT
Start: 2023-01-19 | End: 2023-01-24

## 2023-01-19 RX ORDER — CARBAMIDE PEROXIDE 81.86 MG/ML
1 SOLUTION/ DROPS AURICULAR (OTIC)
Refills: 0 | Status: DISCONTINUED | OUTPATIENT
Start: 2023-01-19 | End: 2023-01-19

## 2023-01-19 RX ORDER — CARBAMIDE PEROXIDE 81.86 MG/ML
5 SOLUTION/ DROPS AURICULAR (OTIC)
Refills: 0 | Status: DISCONTINUED | OUTPATIENT
Start: 2023-01-19 | End: 2023-01-20

## 2023-01-19 RX ADMIN — APIXABAN 5 MILLIGRAM(S): 2.5 TABLET, FILM COATED ORAL at 21:37

## 2023-01-19 RX ADMIN — APIXABAN 5 MILLIGRAM(S): 2.5 TABLET, FILM COATED ORAL at 10:25

## 2023-01-19 RX ADMIN — MONTELUKAST 10 MILLIGRAM(S): 4 TABLET, CHEWABLE ORAL at 10:24

## 2023-01-19 RX ADMIN — Medication 20 MILLIGRAM(S): at 10:24

## 2023-01-19 RX ADMIN — Medication 40 MILLIGRAM(S): at 07:57

## 2023-01-19 RX ADMIN — Medication 3 MILLILITER(S): at 17:34

## 2023-01-19 RX ADMIN — Medication 20 MILLIGRAM(S): at 23:10

## 2023-01-19 RX ADMIN — Medication 3 MILLILITER(S): at 20:39

## 2023-01-19 RX ADMIN — Medication 40 MILLIGRAM(S): at 12:49

## 2023-01-19 RX ADMIN — Medication 20 MILLIGRAM(S): at 17:34

## 2023-01-19 RX ADMIN — AMLODIPINE BESYLATE 10 MILLIGRAM(S): 2.5 TABLET ORAL at 10:24

## 2023-01-19 RX ADMIN — CARBAMIDE PEROXIDE 5 DROP(S): 81.86 SOLUTION/ DROPS AURICULAR (OTIC) at 17:35

## 2023-01-19 RX ADMIN — MACITENTAN 10 MILLIGRAM(S): 10 TABLET, FILM COATED ORAL at 12:44

## 2023-01-19 RX ADMIN — Medication 3 MILLILITER(S): at 07:59

## 2023-01-19 RX ADMIN — Medication 3 MILLILITER(S): at 12:43

## 2023-01-19 RX ADMIN — LOSARTAN POTASSIUM 100 MILLIGRAM(S): 100 TABLET, FILM COATED ORAL at 11:01

## 2023-01-19 NOTE — PROGRESS NOTE ADULT - SUBJECTIVE AND OBJECTIVE BOX
PROGRESS NOTE:     Patient is a 57y old  Female who presents with a chief complaint of SOB.    INTERVAL HISTORY: Examined at bedside this am, and was on bipap. Was in deep sleep, but able to be awoken and AOx2-3. Pt was agitated and demanded the bipap to be removed because it was too much pressure and made her feel suffocated. With RT at bedside, transitioned her to NC with SpO2 >92%. Advised pt to bring home CPAP machine if she doesn't feel comfortable with our mask CPAP machine.    MEDICATIONS  (STANDING):  albuterol/ipratropium for Nebulization 3 milliLiter(s) Nebulizer every 6 hours  amLODIPine   Tablet 10 milliGRAM(s) Oral daily  apixaban 5 milliGRAM(s) Oral every 12 hours  furosemide   Injectable 40 milliGRAM(s) IV Push two times a day  losartan 100 milliGRAM(s) Oral daily  macitentan 10 milliGRAM(s) Oral daily  montelukast 10 milliGRAM(s) Oral daily  predniSONE   Tablet 40 milliGRAM(s) Oral daily  sildenafil (REVATIO) 20 milliGRAM(s) Oral three times a day      PHYSICAL EXAM:  Vital Signs Last 24 Hrs  T(C): 37.3 (2023 07:55), Max: 37.3 (2023 07:55)  T(F): 99.1 (2023 07:55), Max: 99.1 (2023 07:55)  HR: 95 (2023 07:55) (80 - 103)  BP: 106/60 (2023 07:55) (106/60 - 153/90)  BP(mean): --  RR: 18 (2023 07:55) (18 - 26)  SpO2: 97% (2023 07:55) (96% - 100%)    Parameters below as of 2023 07:55  Patient On (Oxygen Delivery Method): BiPAP/CPAP        CONSTITUTIONAL: NAD, well-developed  HEENT: on initial exam with bipap mask on; then had it removed to NC 4L  RESPIRATORY: Normal respiratory effort; lungs are clear to auscultation bilaterally  CARDIOVASCULAR: Regular rate and rhythm, normal S1 and S2, no murmur/rub/gallop; No lower extremity edema; Peripheral pulses are 2+ bilaterally  ABDOMEN: Nontender to palpation, normoactive bowel sounds, no rebound/guarding; No hepatosplenomegaly  MUSCULOSKELETAL: no clubbing or cyanosis of digits; no joint swelling or tenderness to palpation; chronic lymphedema skin changes on b/l legs   PSYCH: A+O to person, place, and time; affect appropriate    LABS:                        11.9   7.66  )-----------( 234      ( 2023 06:30 )             41.9         148<H>  |  93<L>  |  21  ----------------------------<  127<H>  3.7   |  48<HH>  |  0.99    Ca    9.6      2023 06:30  Phos  3.1       Mg     2.40         TPro  6.9  /  Alb  4.1  /  TBili  0.5  /  DBili  x   /  AST  21  /  ALT  46<H>  /  AlkPhos  161<H>      PT/INR - ( 2023 11:14 )   PT: 15.0 sec;   INR: 1.29 ratio         PTT - ( 2023 11:14 )  PTT:34.4 sec      Urinalysis Basic - ( 2023 13:15 )    Color: Light Yellow / Appearance: Clear / S.012 / pH: x  Gluc: x / Ketone: Negative  / Bili: Negative / Urobili: <2 mg/dL   Blood: x / Protein: Negative / Nitrite: Negative   Leuk Esterase: Large / RBC: 2 /HPF / WBC 9 /HPF   Sq Epi: x / Non Sq Epi: 3 /HPF / Bacteria: Negative        ******************************  Authored By: Anabell Rowell MD PGY1  Internal Medicine  MS Teams  ******************************

## 2023-01-19 NOTE — PROGRESS NOTE ADULT - PROBLEM SELECTOR PLAN 6
on home cpap at night at home with poor compliance     - advised pt to bring home cpap machine if she doesn't feel comfortable with hospital bipap/cpap masks

## 2023-01-19 NOTE — CONSULT NOTE ADULT - SUBJECTIVE AND OBJECTIVE BOX
CHIEF COMPLAINT:    HPI:  58 y/o F with PMH morbid obesity, Pulm HTN, PE on eliquis, COPD on home oxygen 3L, OLAMIDE p/w increased WOB and hypoxia. pt reports having increased trouble with breathing and ems found her to be hypoxic to 60% on her 3L. Pt states she has been having shortness of breath for the last 24 hours. has not taken any nebulizers today and has not used any inhalers for symptoms relief.     Of note, she has had a cough for about 1 week which she attributes to dry air and leaving by the [employer] in [place]. Cough is associated with some pleuritic CP and mild clear sputum. She denies fever, chills, chest pain. She follows with dr. Byrne for pulm htn. She has a BIPAP at home which she recently obtained for her copd.     In the ED she was noted to be tachypneic and hypoxic requiring placement on BIPAP. She received Lasix x 1, Solumedrol x 1, and Magnesium. Hemodynamically stable on presentation. CBC unremarkable, BMP with mild hypernatremia and HCO3 39. ABG with metabolic acidosis PCO2 115, HCO3 59, and PH 7.32.         PAST MEDICAL & SURGICAL HISTORY:  HTN (hypertension)      Pulmonary embolism      Acute DVT (deep venous thrombosis)      Morbid obesity      H/O  section          FAMILY HISTORY:  Family history of DVT (Sibling)    FH: HTN (hypertension) (Mother)    FH: type 2 diabetes (Father)        SOCIAL HISTORY:  Smoking: No smoking  No drinking    Allergies    No Known Allergies    Intolerances        HOME MEDICATIONS:    REVIEW OF SYSTEMS:    CONSTITUTIONAL: No weakness, fevers or chills  EYES: PEERLA  ENT: No visual changes;  No vertigo or throat pain   NECK: No pain or stiffness  RESPIRATORY: +cough. No wheezing, hemoptysis; + shortness of breath  CARDIOVASCULAR: No chest pain or palpitations  GASTROINTESTINAL: No abdominal or epigastric pain. No nausea, vomiting, or hematemesis; No diarrhea or constipation. No melena or hematochezia.  GENITOURINARY: No dysuria, frequency or hematuria  NEUROLOGICAL: No numbness or weakness  SKIN: No itching, rashes  Psych: No anxiety. No depression      OBJECTIVE:  ICU Vital Signs Last 24 Hrs  T(C): 36.7 (2023 11:07), Max: 36.7 (2023 10:06)  T(F): 98 (2023 11:07), Max: 98 (2023 10:06)  HR: 90 (2023 16:56) (78 - 91)  BP: 131/93 (2023 16:56) (131/93 - 153/90)  BP(mean): --  ABP: --  ABP(mean): --  RR: 26 (2023 16:56) (22 - 26)  SpO2: 96% (2023 16:56) (60% - 98%)    O2 Parameters below as of 2023 16:56  Patient On (Oxygen Delivery Method): BiPAP/CPAP              CAPILLARY BLOOD GLUCOSE    VITALS:   T(C): 36.7 (23 @ 11:07), Max: 36.7 (23 @ 10:06)  HR: 90 (23 @ 16:56) (78 - 91)  BP: 131/93 (23 @ 16:56) (131/93 - 153/90)  RR: 26 (23 @ 16:56) (22 - 26)  SpO2: 96% (23 @ 16:56) (60% - 98%)    GENERAL: Obese individual. NAD, lying in bed comfortably with BIPAP mask   HEAD:  Atraumatic, normocephalic  EYES: EOMI, PERRLA, conjunctiva and sclera clear  ENT: Moist mucous membranes  NECK: Supple, no JVD  HEART: Regular rate and rhythm, no murmurs, rubs, or gallops  LUNGS: Unlabored respirations.  Clear to auscultation bilaterally, no crackles, wheezing, or rhonchi  ABDOMEN: Soft, nontender, nondistended, +BS  EXTREMITIES: 3+ pitting edema b/l. 2+ peripheral pulses bilaterally. No clubbing, cyanosis  NERVOUS SYSTEM:  A&Ox3, no focal deficits   SKIN: No rashes or lesions  Psych: Normal. normal behavior. normal speech    HOSPITAL MEDICATIONS:  MEDICATIONS  (STANDING):  potassium chloride  10 mEq/100 mL IVPB 10 milliEquivalent(s) IV Intermittent every 1 hour    MEDICATIONS  (PRN):      LABS:                        11.9   7.37  )-----------( 241      ( 2023 11:14 )             43.1         147<H>  |  93<L>  |  13  ----------------------------<  123<H>  3.5   |  49<HH>  |  0.62    Ca    9.9      2023 11:14    TPro  7.8  /  Alb  4.3  /  TBili  0.8  /  DBili  x   /  AST  32  /  ALT  61<H>  /  AlkPhos  173<H>      PT/INR - ( 2023 11:14 )   PT: 15.0 sec;   INR: 1.29 ratio         PTT - ( 2023 11:14 )  PTT:34.4 sec  Urinalysis Basic - ( 2023 13:15 )    Color: Light Yellow / Appearance: Clear / S.012 / pH: x  Gluc: x / Ketone: Negative  / Bili: Negative / Urobili: <2 mg/dL   Blood: x / Protein: Negative / Nitrite: Negative   Leuk Esterase: Large / RBC: 2 /HPF / WBC 9 /HPF   Sq Epi: x / Non Sq Epi: 3 /HPF / Bacteria: Negative      Arterial Blood Gas:   @ 15:00  7.42/89/82/58/96.9/27.6  ABG lactate: --  Arterial Blood Gas:   @ 14:09  7.32/115/31/59/44.0/26.6  ABG lactate: --    Venous Blood Gas:   @ 11:14  7.32/115/35/59/55.6  VBG Lactate: 1.6      MICROBIOLOGY:     RADIOLOGY:  [ ] Reviewed and interpreted by me    EKG:
CHIEF COMPLAINT:    HPI   57F PMH of pulm HTN (follows with Dr Byrne), multiple PEs and LLE DVT (on apixaban), COPD on home oxygen 3L NC, OLAMIDE on NIV, obesity, HTN presents with approx 1 week of lightheadedness and "not feeling herself" and  episodes of forgetfulness and an episode of near syncope at home AM  found to be in acute on chronic hypoxemic respiratory failure in setting of acute diastolic heart failure and volume overload. Pulm consulted for further management.             PAST MEDICAL & SURGICAL HISTORY:  HTN (hypertension)      Pulmonary embolism      Acute DVT (deep venous thrombosis)      Morbid obesity      H/O  section          FAMILY HISTORY:  Family history of DVT (Sibling)    FH: HTN (hypertension) (Mother)    FH: type 2 diabetes (Father)        SOCIAL HISTORY:  Smoking: [ ] Never Smoked [ ] Former Smoker (__ packs x ___ years) [ ] Current Smoker  (__ packs x ___ years)  Substance Use: [ ] Never Used [ ] Used ____  EtOH Use:  Occupation:  Recent Travel:  Country of Birth:      Allergies    No Known Allergies    Intolerances        HOME MEDICATIONS:     ROS  Constitutional: denies fevers, chills, night sweats, weight loss  HEENT: denies visual changes, cough  Cardiovascular: denies palpitations, chest pain, edema  Respiratory: denies SOB, wheezing  Gastrointestinal: denies N/V/D, abdominal pain, hematochezia, melena  : denies dysuria, urinary urgency, increased frequency  MSK: denies muscle weakness, joint pain  Skin: denies new rashes or masses  Heme: denies bleeding, bruising  Neuro: denies headache, weakness    PHYSICAL EXAM:   GEN: Age appropriate, resting comfortably in bed, no acute distress, non toxic appearing, speaking in complete sentences.   HEENT: Conjunctiva injection, nasal congestion  PULM: Lungs CTAB, no wheezes, rales, rhonchi  CV: RRR, S1S2, no MRG  MSK: no stiffness or joint effusions  Abdominal: Soft, nontender to palpation, non-distended, +BS  Extremities: lower extremity edema  NEURO: AAOx3  Psych: normal affect, normal behavior  Skin: No rashes, lesions      OBJECTIVE:  ICU Vital Signs Last 24 Hrs  T(C): 37.2 (2023 18:15), Max: 37.3 (2023 07:55)  T(F): 98.9 (2023 18:15), Max: 99.1 (2023 07:55)  HR: 100 (2023 18:15) (93 - 103)  BP: 125/79 (2023 18:15) (91/81 - 125/79)  BP(mean): --  ABP: --  ABP(mean): --  RR: 18 (2023 18:15) (18 - 24)  SpO2: 90% (2023 18:15) (90% - 100%)    O2 Parameters below as of 2023 18:15  Patient On (Oxygen Delivery Method): nasal cannula  O2 Flow (L/min): 4            CAPILLARY BLOOD GLUCOSE            HOSPITAL MEDICATIONS:  apixaban 5 milliGRAM(s) Oral every 12 hours      amLODIPine   Tablet 10 milliGRAM(s) Oral daily  furosemide   Injectable 40 milliGRAM(s) IV Push daily  losartan 100 milliGRAM(s) Oral daily  sildenafil (REVATIO) 20 milliGRAM(s) Oral three times a day    predniSONE   Tablet 40 milliGRAM(s) Oral daily    albuterol/ipratropium for Nebulization 3 milliLiter(s) Nebulizer every 6 hours  montelukast 10 milliGRAM(s) Oral daily                carbamide peroxide Otic Solution 5 Drop(s) Right Ear two times a day        LABS:                        11.9   7.66  )-----------( 234      ( 2023 06:30 )             41.9     Hgb Trend: 11.9<--, 11.9<--      148<H>  |  93<L>  |  21  ----------------------------<  127<H>  3.7   |  48<HH>  |  0.99    Ca    9.6      2023 06:30  Phos  3.1       Mg     2.40         TPro  6.9  /  Alb  4.1  /  TBili  0.5  /  DBili  x   /  AST  21  /  ALT  46<H>  /  AlkPhos  161<H>      Creatinine Trend: 0.99<--, 0.62<--  PT/INR - ( 2023 11:14 )   PT: 15.0 sec;   INR: 1.29 ratio         PTT - ( 2023 11:14 )  PTT:34.4 sec  Urinalysis Basic - ( 2023 13:15 )    Color: Light Yellow / Appearance: Clear / S.012 / pH: x  Gluc: x / Ketone: Negative  / Bili: Negative / Urobili: <2 mg/dL   Blood: x / Protein: Negative / Nitrite: Negative   Leuk Esterase: Large / RBC: 2 /HPF / WBC 9 /HPF   Sq Epi: x / Non Sq Epi: 3 /HPF / Bacteria: Negative      Arterial Blood Gas:   @ 15:00  7.42/89/82/58/96.9/27.6  ABG lactate: --  Arterial Blood Gas:   @ 14:09  7.32/115/31/59/44.0/26.6  ABG lactate: --    Venous Blood Gas:   @ 15:00  7.44/86/41/58/69.8  VBG Lactate: 1.8  Venous Blood Gas:   @ 11:14  7.32/115/35/59/55.6  VBG Lactate: 1.6

## 2023-01-19 NOTE — PROGRESS NOTE ADULT - ATTENDING COMMENTS
Joy Manzo MD  Medicine Attending  Teams preferred/Cell: [phone]    I have personally seen and examined patient. I discussed the case with Dr. Rowell and agree with findings and plan as detailed per note above.  58 y/o F with multiple comorbidities including COPD on 3L , pulm HTN, OLAMIDE on cpap admitted for acute hypercarbic respiratory failure.     Pt much improved this morning. Initially co2 was 115 but now has improved to high 80s. mentating well. complaining of right ear pain. no other concerns    Exam  nad  lungs CTA  s1 s2 rrr, no significant edema b/l, no m/r/g    Plan  #acute hypercarbic respiratory failure 2/2 to COPD exacerbation in the setting of significant pulm HTN as well  -f/u CT chest  -start steroids  -nebs atc  -appreciate pulm's input  -cpap machine outpatient needs to be adjusted, pt not complaint, discuss with pulm  -give additional iv diuresis today and then switch to oral tomorrow  -check TTE given POCUS noted fluid in chest    otherwise as noted above

## 2023-01-19 NOTE — PROGRESS NOTE ADULT - PROBLEM SELECTOR PLAN 4
- Telemetry w/ continuous pulse ox  - Continue home sildenafil  - Continue home Opsumit  - Diuresis as per above  - Obtain TTE  - Hold home aldactone for now while being diuresed with IV Lasix as above

## 2023-01-19 NOTE — PROGRESS NOTE ADULT - PROBLEM SELECTOR PLAN 5
, ALT 61, AST 32  Possibly i/s/o hepatic congestion from ADHF  - Trend CMP  - If liver enzymes remain elevated, can conduct further work up with CT or US imaging

## 2023-01-19 NOTE — CONSULT NOTE ADULT - NS ATTEST RISK PROBLEM GEN_ALL_CORE FT
Pulmonary hypertension, acute on chronic hypoxemic respiratory failure, hypercapnic respiratory failure

## 2023-01-19 NOTE — CONSULT NOTE ADULT - ASSESSMENT
57F PMH of pulm HTN (follows with Dr Byrne, on sildenafil, recently started on macitentan 01/03/23), multiple PEs and LLE DVT (on apixaban), COPD on home oxygen 3L NC, OLAMIDE/OHS (on NIV), obesity class III, HTN presents with approx 1 week of lightheadedness and "not feeling herself" and  episodes of forgetfulness and an episode of near syncope at home AM 1/18 found to be in acute on chronic hypoxemic respiratory failure Pulm consulted for further management.     Assessment: acute on chronic resp failure likely 2/2 volume overload in the setting of ADHF and pHTN exacerbation. Patient has lower extremity edema and evidence of possible volume overload on CT. No PE on CTA. She reports medication compliance including with diuretic. Patient recently started taking macitentan on 01/03/2023. Macitentan is also known to cause lower extremity edema (18%) and pulmonary edema (21%). She seems to have developed near syncopal event approximately 1 hour after taking all of her home medications which she usually takes around 0630. She was taking her macitentan along with all of her other medications at around 0630 AM.      She has symptoms of URI including conjunctival injection, rhinorrhea, nasal congestion, and right ear fullness. These symptoms could be due to viral URI however nasopharyngitis can occur in up 20% of patients taking macitentan.       Plan:  Continue IV diuresis target net negative 1-2 L in 24 hours  Maintain strict I/Os, daily weights  Hold Macitentan  Please order  AVAPs  ml, EPAP 7 cm H2O, IPAP 12 cm - 25 cm H2O QHS and PRN during naps. Will likely discharge patient on AVAPs.   Check ABG in AM 1/19  Pulm will follow 57F PMH of pulm HTN (follows with Dr Byrne, on sildenafil, recently started on macitentan 01/03/23), multiple PEs and LLE DVT (on apixaban), COPD on home oxygen 3L NC, OLAMIDE/OHS (on NIV), obesity class III, HTN presents with approx 1 week of lightheadedness and "not feeling herself" and  episodes of forgetfulness and an episode of near syncope at home AM 1/18 found to be in acute on chronic hypoxemic respiratory failure Pulm consulted for further management.     Assessment: acute on chronic resp failure likely 2/2 volume overload in the setting of ADHF and pHTN exacerbation. Patient has lower extremity edema and evidence of possible volume overload on CT. No PE on CTA. She reports medication compliance including with diuretic. Patient recently started taking macitentan on 01/03/2023. Macitentan is also known to cause lower extremity edema (18%) and pulmonary edema (21%). She seems to have developed near syncopal event approximately 1 hour after taking all of her home medications which she usually takes around 0630. She was taking her macitentan along with all of her other medications at around 0630 AM.      She has symptoms of URI including conjunctival injection, rhinorrhea, nasal congestion, and right ear fullness. These symptoms could be due to viral URI however nasopharyngitis can occur in up 20% of patients taking macitentan.      Case discussed with Dr Byrne 1/19      Plan:  Continue IV diuresis target net negative 1-2 L in 24 hours  Maintain strict I/Os, daily weights  C/w home sildenafil   Hold Macitentan  Please order  AVAPs  ml, EPAP 7 cm H2O, IPAP 12 cm - 25 cm H2O QHS and PRN during naps. Will likely discharge patient on AVAPs.   Check ABG in AM 1/19  Pulm will follow

## 2023-01-19 NOTE — PATIENT PROFILE ADULT - FALL HARM RISK - HARM RISK INTERVENTIONS
Follow -up visit      HISTORY OF PRESENT ILLNESS:  This patient is seen for recheck of ulcerations on the bottom of both feet.  He stated his condition was very quiet for the last few months and he did not even appreciate any fluid coming from the area until last week.  He has been limiting his activities and he is no longer using his crow walker.  He does have a history of peripheral neuropathy.    Visit Vitals  Resp 18   Ht 6' 4\" (1.93 m)   Wt 105.2 kg   BMI 28.23 kg/m²       Current Outpatient Medications   Medication Sig Dispense Refill   • cephalexin (KEFLEX) 500 MG capsule Take 1 capsule by mouth 3 times daily for 7 days. 21 capsule 0   • losartan (COZAAR) 100 MG tablet Take 1 tablet by mouth daily. 90 tablet 3   • fluticasone (FLONASE) 50 MCG/ACT nasal spray Spray 2 sprays in each nostril daily.     • aspirin (ASPIRIN CHILDRENS) 81 MG chewable tablet Chew 1 tablet by mouth daily.     • cholecalciferol (VITAMIN D3) 1000 UNITS tablet Take 1,000 Units by mouth daily.     • Multiple Vitamins-Minerals (MULTIVITAMIN & MINERAL PO) Take 1 tablet by mouth daily.       No current facility-administered medications for this visit.        Allergies as of 09/01/2020 - Reviewed 08/25/2020   Allergen Reaction Noted   • Lisinopril Cough 02/18/2019   • Sulfa drugs cross reactors         Patient Active Problem List   Diagnosis   • Essential hypertension, benign   • Cough   • Inguinal hernia without mention of obstruction or gangrene, unilateral or unspecified, (not specified as recurrent)   • Sensorineural hearing loss, bilateral   • Dysplastic nevus of trunk   • Arthritis of both knees   • Alcohol abuse, daily use   • Macrocytosis   • Hepatic dysfunction   • Gait instability   • Axonal sensorimotor neuropathy   • Numbness of both lower extremities   • Sarcoidosis of lung (CMS/HCC)         PHYSICAL EXAM:  This patient has discolored draining ulceration under the 5th metatarsal on both feet.  There is a large volume of tissue  that requires debridement on each foot.  There is very mild surrounding erythema on his right foot and a little swelling in the foot is present.  His wound on the right foot is approximately 3/4 of a cm in diameter.  The wound on his left foot is about 1/2 cm in diameter full-thickness of the skin.  There is a much wider area of tissue that requires debridement on both feet.  He does have a cavus foot type and he had some history of Charcot changes to his right ankle.  He does have a little more varus rotation on his right foot as a consequence but his ankle is free of any swelling or sign of neuropathic joint disease.  He has no pain in this area due to his neuropathy.  Pulses do remain palpable for DP and PT                    .        Assessment :   Plantar ulceration 5th metatarsal bilateral, peripheral neuropathy bilateral    plan :    Fifteen blade was used to debride the ulceration under the 5th metatarsal on both feet.  All nonviable tissue was removed down to dermal layers of the skin.  He is advised on local treatments and bandaging daily.  A prescription for cephalexin 500 mg t.i.d. was given for 7 days.  I will follow up with this patient again in 3 weeks.  He felt like he was doing well for a period of time and thought maybe he was going to be able to avoid any future surgery however we will see how he progresses.              Assistance with ambulation/Assistance OOB with selected safe patient handling equipment/Communicate Risk of Fall with Harm to all staff/Monitor for mental status changes/Monitor gait and stability/Move patient closer to nurses' station/Provide patient with walking aids - walker, cane, crutches/Reinforce activity limits and safety measures with patient and family/Review medications for side effects contributing to fall risk/Sit up slowly, dangle for a short time, stand at bedside before walking/Tailored Fall Risk Interventions/Toileting schedule using arm’s reach rule for commode and bathroom/Use of alarms - bed, chair and/or voice tab/Visual Cue: Yellow wristband and red socks/Bed in lowest position, wheels locked, appropriate side rails in place/Call bell, personal items and telephone in reach/Instruct patient to call for assistance before getting out of bed or chair/Non-slip footwear when patient is out of bed/[place] to call system/Physically safe environment - no spills, clutter or unnecessary equipment/Purposeful Proactive Rounding/Room/bathroom lighting operational, light cord in reach

## 2023-01-19 NOTE — CONSULT NOTE ADULT - ATTENDING COMMENTS
57F with class III obesity, pulmonary hypertension, history of PE on apixaban, COPD on home 3LPM, OLAMIDE/OHS CPAP pending delivery who presents with URI symptoms and near syncopal episode with acute on chronic hypoxemic respiratory failure in setting of acute diastolic heart failure.   - Continue IV diuresis, strict I/Os, Keep negative 1 - 2 L.  - Recommend TTE  - Hold Macitentan  - Check CTA  - Change to AVAPs  ml, EPAP 7 cm H2O, IPAP 12 cm - 25 cm H2O.  - Check ABG in AM
Patient is a 58 yo F with PMH morbid obesity, Pulm HTN, PE (on Eliquis), COPD (Home O2 3L), OLAMIDE (CPAP) p/w increased WOB and hypoxia in setting of decreased UOP and increased weight gain despite endorsed compliance with diuretics (Lasic 40mg PO BID) and pHTN medications.     #Acute on chronic hypoxemic and hypercapnic respiratory failure - VBG 7.32/115/31. Patient placed on BIPAP 16/8 60% with ABG 7.42/89/82. Patient awake and mentating well, likely at chronic PCO2 level. Diuresing well with IV Lasix (received total 80mg IV in ED). Likely 2/2 ADHF vs worsening pHTN.  - Agree w/ diuresis  - c/w BIPAP 16/8 PRN and qHS  - TTE  - Can admit to tele/pulse ox  - Patient does not require MICU level of care at this time.  Please re-consult as needed.  - Pulmonary to see in AM    Celio Quick MD  Pulmonary & Critical Care

## 2023-01-19 NOTE — ED ADULT NURSE REASSESSMENT NOTE - NS ED NURSE REASSESS COMMENT FT1
Pt maintaining normal mental status, breathing spontaneously and nonlabored, no acute distress in appearance. Bipap removed to administer medications, placed on a NRB @ 15 LPM and pt maintaining spo2 98% on same. Pt reports feeling better on the NRB vs bipap at this time. Continuous cardiac monitoring shows normal sinus rhythm. Will continue to monitor.
Received pt from previous RN in bed awake and alert, breathing well on RA and in NAD, pt noted to be on NRM, was on BiPAP but has been put on NRM for about an hour and pt tolerating. when asked about her breathing, pt responded, "i'm doing a lot better. I'm fine". pt is sat'ing 99% on mask. report given to [facility], awaiting room availability and pt transport. pt has staff in room with her.
Report received from DONAVAN Albrecht. Pt is a&ox4, breathing spontaneously and nonlabored, no acute distress in appearance. Pt currently on bipap, reports no shortness of breath and that she is feeling better, 96% spo2. Vitals as noted in chart, continuous cardiac monitoring shows normal sinus rhythm. Pt skin is appropriate for race. Patent 20G IV in left AC, free flowing/no pain/redness at site. Pt medicated as per orders. Safety precautions in place and to be maintained, bed in lowest position, call bell within reach, pt on primafit. Will continue to monitor.

## 2023-01-19 NOTE — PATIENT PROFILE ADULT - FUNCTIONAL ASSESSMENT - BASIC MOBILITY 6.
2-calculated by average/Not able to assess (calculate score using [facility] averaging method)

## 2023-01-19 NOTE — PROGRESS NOTE ADULT - PROBLEM SELECTOR PLAN 1
Patient w/ history of OLAMIDE/OHS, COPD, pulmonary hypertension who presented hypoxic to 60% upon EMS arrival in the field, VBG with pH 7.32 and pCO2 115 on admission, placed on BiPAP, ABG later on with pH 7.42 and pCO2 89.   CXR w/ clear lungs, POCUS w/ b/l B-lines. Likely i/s/o viral infection worsening pHTN vs COPD exacerbation    - Telemetry w/ continuous pulse ox; strict I/Os, daily weights  - S/p IV Lasix 40 mg x2 in ED. Continue IV Lasix 40 mg qd for now.  - f/u TTE  - f/u CTA chest with IV contrast to r/o PE  - Continue BiPAP 16/8 PRN & qhs  - prednisone 40mg qd and duonebs q6hrs standing for now for possible COPD exacerbation, although less likely given procal not sig elevated  - f/u pm ABG or VBG  - f/u pulm recs

## 2023-01-19 NOTE — PROGRESS NOTE ADULT - ASSESSMENT
57F PMH morbid obesity, HTN, pulm HTN, multiple PEs and LLE DVT on Eliquis, COPD on home oxygen 3L NC, OLAMIDE on nocturnal BiPAP who presented with acute on chronic hypoxemic & hypercapnic respiratory failure, likely i/s/o chronic OHS, OLAMIDE, pulmonary hypertension, COPD. Likely hypercapnic resp failure in setting of viral infection exacerbating COPD vs pulm HTN. Pulm consulted.

## 2023-01-20 ENCOUNTER — TRANSCRIPTION ENCOUNTER (OUTPATIENT)
Age: 58
End: 2023-01-20

## 2023-01-20 LAB
ALBUMIN SERPL ELPH-MCNC: 4.4 G/DL — SIGNIFICANT CHANGE UP (ref 3.3–5)
ALP SERPL-CCNC: 169 U/L — HIGH (ref 40–120)
ALT FLD-CCNC: 41 U/L — HIGH (ref 4–33)
ANION GAP SERPL CALC-SCNC: 9 MMOL/L — SIGNIFICANT CHANGE UP (ref 7–14)
AST SERPL-CCNC: 26 U/L — SIGNIFICANT CHANGE UP (ref 4–32)
BILIRUB SERPL-MCNC: 0.6 MG/DL — SIGNIFICANT CHANGE UP (ref 0.2–1.2)
BUN SERPL-MCNC: 25 MG/DL — HIGH (ref 7–23)
CALCIUM SERPL-MCNC: 10 MG/DL — SIGNIFICANT CHANGE UP (ref 8.4–10.5)
CHLORIDE SERPL-SCNC: 91 MMOL/L — LOW (ref 98–107)
CO2 SERPL-SCNC: 46 MMOL/L — CRITICAL HIGH (ref 22–31)
CREAT SERPL-MCNC: 0.88 MG/DL — SIGNIFICANT CHANGE UP (ref 0.5–1.3)
EGFR: 78 ML/MIN/1.73M2 — SIGNIFICANT CHANGE UP
GAS PNL BLDV: SIGNIFICANT CHANGE UP
GLUCOSE SERPL-MCNC: 88 MG/DL — SIGNIFICANT CHANGE UP (ref 70–99)
HCT VFR BLD CALC: 46.2 % — HIGH (ref 34.5–45)
HGB BLD-MCNC: 13 G/DL — SIGNIFICANT CHANGE UP (ref 11.5–15.5)
MAGNESIUM SERPL-MCNC: 2.7 MG/DL — HIGH (ref 1.6–2.6)
MCHC RBC-ENTMCNC: 27.4 PG — SIGNIFICANT CHANGE UP (ref 27–34)
MCHC RBC-ENTMCNC: 28.1 GM/DL — LOW (ref 32–36)
MCV RBC AUTO: 97.5 FL — SIGNIFICANT CHANGE UP (ref 80–100)
NRBC # BLD: 0 /100 WBCS — SIGNIFICANT CHANGE UP (ref 0–0)
NRBC # FLD: 0 K/UL — SIGNIFICANT CHANGE UP (ref 0–0)
PHOSPHATE SERPL-MCNC: 3.4 MG/DL — SIGNIFICANT CHANGE UP (ref 2.5–4.5)
PLATELET # BLD AUTO: 243 K/UL — SIGNIFICANT CHANGE UP (ref 150–400)
POTASSIUM SERPL-MCNC: 3.7 MMOL/L — SIGNIFICANT CHANGE UP (ref 3.5–5.3)
POTASSIUM SERPL-SCNC: 3.7 MMOL/L — SIGNIFICANT CHANGE UP (ref 3.5–5.3)
PROT SERPL-MCNC: 7.8 G/DL — SIGNIFICANT CHANGE UP (ref 6–8.3)
RBC # BLD: 4.74 M/UL — SIGNIFICANT CHANGE UP (ref 3.8–5.2)
RBC # FLD: 14.2 % — SIGNIFICANT CHANGE UP (ref 10.3–14.5)
SODIUM SERPL-SCNC: 146 MMOL/L — HIGH (ref 135–145)
WBC # BLD: 7.25 K/UL — SIGNIFICANT CHANGE UP (ref 3.8–10.5)
WBC # FLD AUTO: 7.25 K/UL — SIGNIFICANT CHANGE UP (ref 3.8–10.5)

## 2023-01-20 PROCEDURE — 99233 SBSQ HOSP IP/OBS HIGH 50: CPT | Mod: GC

## 2023-01-20 PROCEDURE — 93306 TTE W/DOPPLER COMPLETE: CPT | Mod: 26

## 2023-01-20 RX ORDER — LANOLIN ALCOHOL/MO/W.PET/CERES
3 CREAM (GRAM) TOPICAL AT BEDTIME
Refills: 0 | Status: DISCONTINUED | OUTPATIENT
Start: 2023-01-20 | End: 2023-01-26

## 2023-01-20 RX ORDER — IPRATROPIUM/ALBUTEROL SULFATE 18-103MCG
3 AEROSOL WITH ADAPTER (GRAM) INHALATION EVERY 8 HOURS
Refills: 0 | Status: DISCONTINUED | OUTPATIENT
Start: 2023-01-20 | End: 2023-01-22

## 2023-01-20 RX ORDER — MAGNESIUM SULFATE 500 MG/ML
2 VIAL (ML) INJECTION ONCE
Refills: 0 | Status: DISCONTINUED | OUTPATIENT
Start: 2023-01-20 | End: 2023-01-20

## 2023-01-20 RX ORDER — FLUTICASONE PROPIONATE 50 MCG
1 SPRAY, SUSPENSION NASAL
Refills: 0 | Status: DISCONTINUED | OUTPATIENT
Start: 2023-01-20 | End: 2023-01-26

## 2023-01-20 RX ADMIN — Medication 40 MILLIGRAM(S): at 05:24

## 2023-01-20 RX ADMIN — Medication 3 MILLIGRAM(S): at 21:47

## 2023-01-20 RX ADMIN — CARBAMIDE PEROXIDE 5 DROP(S): 81.86 SOLUTION/ DROPS AURICULAR (OTIC) at 05:25

## 2023-01-20 RX ADMIN — APIXABAN 5 MILLIGRAM(S): 2.5 TABLET, FILM COATED ORAL at 17:42

## 2023-01-20 RX ADMIN — APIXABAN 5 MILLIGRAM(S): 2.5 TABLET, FILM COATED ORAL at 05:24

## 2023-01-20 RX ADMIN — MONTELUKAST 10 MILLIGRAM(S): 4 TABLET, CHEWABLE ORAL at 15:00

## 2023-01-20 RX ADMIN — LOSARTAN POTASSIUM 100 MILLIGRAM(S): 100 TABLET, FILM COATED ORAL at 05:24

## 2023-01-20 RX ADMIN — Medication 20 MILLIGRAM(S): at 15:01

## 2023-01-20 RX ADMIN — Medication 20 MILLIGRAM(S): at 21:46

## 2023-01-20 RX ADMIN — Medication 1 SPRAY(S): at 17:45

## 2023-01-20 RX ADMIN — Medication 3 MILLILITER(S): at 04:01

## 2023-01-20 RX ADMIN — Medication 20 MILLIGRAM(S): at 05:24

## 2023-01-20 RX ADMIN — Medication 3 MILLILITER(S): at 16:52

## 2023-01-20 RX ADMIN — AMLODIPINE BESYLATE 10 MILLIGRAM(S): 2.5 TABLET ORAL at 05:24

## 2023-01-20 RX ADMIN — Medication 3 MILLILITER(S): at 22:30

## 2023-01-20 NOTE — PROGRESS NOTE ADULT - PROBLEM SELECTOR PLAN 4
- Telemetry w/ continuous pulse ox  - Continue home sildenafil  - d/c home Opsumit per pulm  - Diuresis as per above  - f/u TTE  - Hold home aldactone for now while being diuresed with IV Lasix as above

## 2023-01-20 NOTE — DISCHARGE NOTE PROVIDER - DETAILS OF MALNUTRITION DIAGNOSIS/DIAGNOSES
This patient has been assessed with a concern for Malnutrition and was treated during this hospitalization for the following Nutrition diagnosis/diagnoses:     -  01/25/2023: Morbid obesity (BMI > 40)

## 2023-01-20 NOTE — PROGRESS NOTE ADULT - ATTENDING COMMENTS
57F with class III obesity, pulmonary hypertension, history of PE on apixaban, COPD on home 3LPM, OLAMIDE/OHS CPAP pending delivery who presents with URI symptoms and near syncopal episode with acute on chronic hypoxemic respiratory failure in setting of acute diastolic heart failure.   - Continue IV diuresis, strict I/Os, Keep negative 1 - 2 L.  - TTE with poor right sided windows  - Hold Macitentan  - CTA negative for PE but with ground glass opacities which is likely in setting of recent infection.   - Continue AVAPs  ml, EPAP 7 cm H2O, IPAP 12 cm - 25 cm H2O, will need to be set up for home AVAPS and swap out her CPAP device.

## 2023-01-20 NOTE — PROGRESS NOTE ADULT - ASSESSMENT
57F PMH of pulm HTN (follows with Dr Byrne, on sildenafil, recently started on macitentan 23), multiple PEs and LLE DVT (on apixaban), COPD on home oxygen 3L NC, OLAMIDE/OHS (on NIV), obesity class III, HTN presents with approx 1 week of lightheadedness and "not feeling herself" and  episodes of forgetfulness and an episode of near syncope at home AM  found to be in acute on chronic hypoxemic respiratory failure Pulm consulted for further management.     Assessment: acute on chronic resp failure likely 2/2 volume overload in the setting of ADHF and pHTN exacerbation. Patient has lower extremity edema and evidence of possible volume overload on CT. No PE on CTA. She reports medication compliance including with diuretic. Patient recently started taking macitentan on 2023. Macitentan is also known to cause lower extremity edema (18%) and pulmonary edema (21%). She seems to have developed near syncopal event approximately 1 hour after taking all of her home medications which she usually takes around 0630. She was taking her macitentan along with all of her other medications at around 0630 AM.      She has symptoms of URI including conjunctival injection, rhinorrhea, nasal congestion, and right ear fullness. These symptoms could be due to viral URI however nasopharyngitis can occur in up 20% of patients taking macitentan.      Case discussed with Dr Byrne       Plan:  Continue IV diuresis target net negative 1-2 L in 24 hours. Will need to see significant improvement in her lower extremity edema prior to discharge.   Maintain strict I/Os, daily weights  C/w home sildenafil   Hold Macitentan  AVAPs  ml, EPAP 7 cm H2O, IPAP 12 cm - 25 cm H2O QHS and PRN during naps. Discharge patient on AVAPs.   Check ABG in AM  to assess hypercapnea  Pulm will follow    This patient will need to close hospital follow up after discharge with the pulmonary team:    Please email: [email or web address] to setup an a close hospital follow up appointment for the patient  prior to discharge with any available pulmonologist. The appointment should be within 1 week of discharge from the hospital. Include the patient's name, , MRN and contact information in the email.      Pulmonary/Sleep Clinic  [address] [place] [address]  [phone]    Please discuss the appointment details with the patient and include the appointment details in the patients discharge summary.

## 2023-01-20 NOTE — PROGRESS NOTE ADULT - PROBLEM SELECTOR PLAN 1
Patient w/ history of OLAMIDE/OHS, COPD, pulmonary hypertension who presented hypoxic to 60% upon EMS arrival in the field, VBG with pH 7.32 and pCO2 115 on admission, placed on BiPAP, ABG later on with pH 7.42 and pCO2 89.   CXR w/ clear lungs, POCUS w/ b/l B-lines. Likely i/s/o viral infection worsening pHTN vs COPD exacerbation    - Telemetry w/ continuous pulse ox; strict I/Os, daily weights  - S/p IV Lasix 40 mg x2 in ED. Continue IV Lasix 40 mg qd for now.  - f/u TTE  - CTA chest with IV contrast: no PE, upper lobe GGO  - Continue BiPAP 16/8 PRN & qhs  - prednisone 40mg qd and duonebs q6hrs standing for now for possible COPD exacerbation, although less likely given procal not sig elevated  - f/u pulm recs: AVAPS at night

## 2023-01-20 NOTE — PROGRESS NOTE ADULT - ASSESSMENT
57F PMH morbid obesity, HTN, pulm HTN, multiple PEs and LLE DVT on Eliquis, COPD on home oxygen 3L NC, OLAMIDE on nocturnal BiPAP who presented with acute on chronic hypoxemic & hypercapnic respiratory failure, likely i/s/o chronic OHS, OLAMIDE, pulmonary hypertension, COPD. Likely hypercapnic resp failure in setting of viral infection exacerbating COPD vs pulm HTN. Pulm consulted. Pending TTE.

## 2023-01-20 NOTE — PROGRESS NOTE ADULT - ATTENDING COMMENTS
57F PMH morbid obesity, HTN, pulm HTN, multiple PEs and LLE DVT on Eliquis, COPD on home oxygen 3L NC, OLAMIDE on nocturnal BiPAP who presented with acute on chronic hypoxemic & hypercapnic respiratory failure, likely i/s/o chronic OHS, OLAMIDE, pulmonary hypertension, COPD. Likely hypercapnic resp failure in setting of viral infection exacerbating COPD vs pulm HTN. Pulm consulted.     Patient seen and examined at bedside. Reports feeling ok. Was not sleeping well initially and later was fine after using her home machine. Trying to move around the bed. Lung exam CTAB, no wheezes. Cardiac exam RRR, no murmur.     #acute on chronic respiratory failure with hypercapnea: on prednisone and nebs. Pulm following. c/w avaps at night. Will need avaps at home on dc. Echo shows EF 62%, grossly normal LV function. RV not well visualized. CTA chest no PE. RVP negative.    #pulmHTN: holding home opsumit per pulm. holding aldactone while on IV lasix    #Elevated LFTs: now slowly improving. Continue to monitor    #OLAMIDE on home cpap: will need avaps as above    #Hx PE: c/w home eliquis    Discussed with Team 4.

## 2023-01-20 NOTE — DISCHARGE NOTE PROVIDER - HOSPITAL COURSE
58 yo F with PMH of morbid obesity, HTN, pulm HTN, multiple PEs and LLE DVT (on Eliquis), COPD on home oxygen 3L NC, OLAMIDE on nocturnal BiPAP who presented with increased work of breathing and hypoxia. On Monday, she developed cold-like symptoms of chills, rhinorrhea, and cough w/ increased sputum production. Since then, she has been feeling increasingly fatigued and had increased shortness of breath, inability to lie flat, weight gain, and episodes of forgetfulness. Today, she had an episode where she almost blacked out in the shower, so EMS was called. On arrival of EMS, she was hypoxic to 60% on her home 3L NC, with difficulty breathing. She denies any measured fevers, N/V/D, constipation, melena, BRBPR, or urinary symptoms.    In the ED, she was tachypneic and hypoxic. Labs w/ mild hypernatremia and bicarb 49. ABG w/ 7.32/115/31/59. She was placed on BiPAP. EKG w/ NSR 81bpm w/o ischemic changes. CXR w/o focal opacities. POCUS w/ b/l b-lines. She received Duonebs x3, Lasix 40mg x2, Solumedrol x1, and magnesium 2g. Repeat ABG 7.42/89/82/58. She was assessed by MICU. Admitted to medicine for further management. Did well on bipap overnight but was claustophobic and required that it be removed to her NC 6L. Pulm consulted and also suspected viral infection leading to worsening COPD vs. pulmonary hypertension. As a result, started on 5d course of steroids which she completed- no indication for antibiotics. Throughout hospitalization, started on AVAPs without tolerance at  , EPAP 7, IPAP 12-25. Changed to bipap 10/5 -> 12/5 -> 14/5 with tolerance. Due to rising bicarb, was given 1x diamox, which then made VBG acidotic. Then trialed on home AVAPs settings inpt, EPAP 5, IPAP 10-20, , which pt tolerated. ABG was obtained afterwards to ensure if pH was truly acidotic or not. ABG ph 7.42, pCO2 61, PO2 93. Stable to be discharged with follow-up with Dr Byrne.     Hospital course was complicated by hypotension, likely in setting of diuresis on top of home BP meds. Responded to midodrine x1 and never required again, with stopping home BP meds, and -120s since. Pt was diuresed on lasix IVP qd then transitioned to Lasix PO then home aldactone. 58 yo F with PMH of morbid obesity, HTN, pulm HTN, multiple PEs and LLE DVT (on Eliquis), COPD on home oxygen 3L NC, OLAMIDE on nocturnal BiPAP who presented with increased work of breathing and hypoxia. On Monday, she developed cold-like symptoms of chills, rhinorrhea, and cough w/ increased sputum production. Since then, she has been feeling increasingly fatigued and had increased shortness of breath, inability to lie flat, weight gain, and episodes of forgetfulness. Today, she had an episode where she almost blacked out in the shower, so EMS was called. On arrival of EMS, she was hypoxic to 60% on her home 3L NC, with difficulty breathing. She denies any measured fevers, N/V/D, constipation, melena, BRBPR, or urinary symptoms.    In the ED, she was tachypneic and hypoxic. Labs w/ mild hypernatremia and bicarb 49. ABG w/ 7.32/115/31/59. She was placed on BiPAP. EKG w/ NSR 81bpm w/o ischemic changes. CXR w/o focal opacities. POCUS w/ b/l b-lines. She received Duonebs x3, Lasix 40mg x2, Solumedrol x1, and magnesium 2g. Repeat ABG 7.42/89/82/58. She was assessed by MICU. Admitted to medicine for further management. Did well on bipap overnight but was claustophobic and required that it be removed to her NC 6L. Pulm consulted and also suspected viral infection leading to worsening COPD vs. pulmonary hypertension. As a result, started on 5d course of steroids which she completed- no indication for antibiotics. Throughout hospitalization, started on AVAPs without tolerance at  , EPAP 7, IPAP 12-25. Changed to bipap 10/5 -> 12/5 -> 14/5 with tolerance. Due to rising bicarb, was given 1x diamox, which then made VBG acidotic. Then trialed on home AVAPs settings inpt, EPAP 5, IPAP 10-20, , which pt tolerated. ABG was obtained afterwards to ensure if pH was truly acidotic or not. ABG ph 7.42, pCO2 61, PO2 93. Stable to be discharged with follow-up with Dr Byrne.     Hospital course was complicated by hypotension, likely in setting of diuresis on top of home BP meds. Responded to midodrine x1 and never required again, with stopping home BP meds, and -120s since. Pt was diuresed on lasix IVP qd then transitioned to Lasix PO then home aldactone.     Joy Manzo MD  Medicine Attending  Teams preferred/Cell: [phone]    I have personally seen and examined patient. I discussed the case with Dr. Rowell and agree with findings and plan as detailed per note above. 56 y/o F with hx of morbid obesity, pulm HTN multiple PE and LLE DVT on Eliquius as well as COPD on 3L admitted for acute hypercarbic and hypoxic respiratory failure. pt was treated with brief course of steroids and Pt transitioned to AVAPS with settings adjusted to tolerance    Pt will be resumed on aldactone. repeat echo stable and shows no signs of diastolic failure. will initiate lasix as outpatient if needed.

## 2023-01-20 NOTE — PROGRESS NOTE ADULT - PROBLEM SELECTOR PLAN 6
on home cpap at night at home with poor compliance     - advised pt to bring home cpap machine nasal if she doesn't feel comfortable with hospital bipap/cpap masks  - now on AVAPS per pulm recs: , EPAP 7, IPAP 12-25

## 2023-01-20 NOTE — DISCHARGE NOTE PROVIDER - CARE PROVIDER_API CALL
Rc Byrne)  Critical Care Medicine; Internal Medicine; Pulmonary Disease; Sleep Medicine  [address], [address]  [place] [address]  Phone: [phone]  Fax: [phone]  Follow Up Time: 2 weeks

## 2023-01-20 NOTE — PROGRESS NOTE ADULT - PROBLEM SELECTOR PLAN 7
Pt with history of multiple PEs and LLE DVT, with 2 PEs with no clear inciting cause.  - Continue home Eliquis 5 mg BID for now  - CTA chest with IV contrast no new PE

## 2023-01-20 NOTE — DISCHARGE NOTE PROVIDER - NSDCFUSCHEDAPPT_GEN_ALL_CORE_FT
Artem Guillory  [facility] Physician [facility]  CARDIOLOGY [address] [facility]   Scheduled Appointment: 01/30/2023

## 2023-01-20 NOTE — CHART NOTE - NSCHARTNOTEFT_GEN_A_CORE
Pt with chronic respiratory failure secondary to COPD and requires non-invasive ventilation. Pt tried and failed BiPAP at 25/12 with Co2 remaining above 80. Without AVAPs, pt will deteriorate and cause re-admission.     Anabell Rowell, PGY-1

## 2023-01-20 NOTE — DISCHARGE NOTE PROVIDER - NSDCCPTREATMENT_GEN_ALL_CORE_FT
PRINCIPAL PROCEDURE  Procedure: CTA chest w/w/o contrast  Findings and Treatment: IMPRESSION:  No pulmonary embolus.  Patchy groundglass opacities noted within the upper lobes along with   minimal patchy consolidation in the lower lobes peripherally, new since   the previous exam. Entities such as atypical infection including COVID 19   should be considered.      SECONDARY PROCEDURE  Procedure: Chest xray, PA & lateral  Findings and Treatment: IMPRESSION: Cardiomegaly and mild vascular congestion

## 2023-01-20 NOTE — PROGRESS NOTE ADULT - SUBJECTIVE AND OBJECTIVE BOX
PROGRESS NOTE:     Patient is a 57y old  Female who presents with a chief complaint of Shortness of breath.    INTERVAL HISTORY: NAEO. VSS. Was on AVAPs overnight with own nasal cannula component but on our avaps machine. Still complaining of R sided nasal/ear congestion. Pt states that she feels better today.     MEDICATIONS  (STANDING):  albuterol/ipratropium for Nebulization 3 milliLiter(s) Nebulizer every 6 hours  amLODIPine   Tablet 10 milliGRAM(s) Oral daily  apixaban 5 milliGRAM(s) Oral every 12 hours  fluticasone propionate 50 MICROgram(s)/spray Nasal Spray 1 Spray(s) Both Nostrils two times a day  furosemide   Injectable 40 milliGRAM(s) IV Push daily  losartan 100 milliGRAM(s) Oral daily  montelukast 10 milliGRAM(s) Oral daily  predniSONE   Tablet 40 milliGRAM(s) Oral daily  sildenafil (REVATIO) 20 milliGRAM(s) Oral three times a day    MEDICATIONS  (PRN):  melatonin 3 milliGRAM(s) Oral at bedtime PRN Insomnia      PHYSICAL EXAM:  Vital Signs Last 24 Hrs  T(C): 36.7 (2023 05:15), Max: 37.2 (2023 18:15)  T(F): 98.1 (2023 05:15), Max: 98.9 (2023 18:15)  HR: 90 (2023 07:47) (89 - 101)  BP: 114/77 (2023 05:15) (91/81 - 125/79)  BP(mean): --  RR: 18 (2023 05:15) (18 - 20)  SpO2: 99% (2023 07:47) (90% - 100%)    Parameters below as of 2023 05:15  Patient On (Oxygen Delivery Method): nasal cannula  O2 Flow (L/min): 4      CONSTITUTIONAL: NAD, well-developed  HEENT: atraumatic, on NC  RESPIRATORY: Normal respiratory effort; lungs are clear to auscultation bilaterally  CARDIOVASCULAR: Regular rate and rhythm, normal S1 and S2, no murmur/rub/gallop; No lower extremity edema; Peripheral pulses are 2+ bilaterally  ABDOMEN: Nontender to palpation, normoactive bowel sounds, no rebound/guarding; No hepatosplenomegaly  MUSCULOSKELETAL: no clubbing or cyanosis of digits; no joint swelling or tenderness to palpation  PSYCH: A+O to person, place, and time; affect appropriate    LABS:                        13.0   7.25  )-----------( 243      ( 2023 07:15 )             46.2     -20    146<H>  |  91<L>  |  25<H>  ----------------------------<  88  3.7   |  46<HH>  |  0.88    Ca    10.0      2023 07:15  Phos  3.4       Mg     2.70         TPro  7.8  /  Alb  4.4  /  TBili  0.6  /  DBili  x   /  AST  26  /  ALT  41<H>  /  AlkPhos  169<H>  20    PT/INR - ( 2023 11:14 )   PT: 15.0 sec;   INR: 1.29 ratio         PTT - ( 2023 11:14 )  PTT:34.4 sec      Urinalysis Basic - ( 2023 13:15 )    Color: Light Yellow / Appearance: Clear / S.012 / pH: x  Gluc: x / Ketone: Negative  / Bili: Negative / Urobili: <2 mg/dL   Blood: x / Protein: Negative / Nitrite: Negative   Leuk Esterase: Large / RBC: 2 /HPF / WBC 9 /HPF   Sq Epi: x / Non Sq Epi: 3 /HPF / Bacteria: Negative        Culture - Blood (collected 2023 10:40)  Source: .Blood Blood-Venous  Preliminary Report (2023 16:01):    No growth to date.    Culture - Blood (collected 2023 10:30)  Source: .Blood Blood-Peripheral  Preliminary Report (2023 16:01):    No growth to date.      ******************************  Authored By: Anabell Rowell MD PGY1  Internal Medicine  MS Teams  ******************************

## 2023-01-20 NOTE — PROGRESS NOTE ADULT - SUBJECTIVE AND OBJECTIVE BOX
Patient is a 57y old  Female who presents with a chief complaint of Shortness of breath (20 Jan 2023 15:05)      SUBJECTIVE / OVERNIGHT EVENTS:    Patient seen and examined at bedside. No acute events overnight.    T(F): 97.9 (01-20 @ 17:00), Max: 98.8 (01-19 @ 21:20)  HR: 97 (01-20 @ 17:00) (88 - 103)  BP: 115/69 (01-20 @ 17:00) (107/64 - 115/69)  RR: 18 (01-20 @ 17:00) (17 - 18)  SpO2: 95% (01-20 @ 17:00) (94% - 100%)    I&O's Summary    20 Jan 2023 07:01  -  20 Jan 2023 20:32  --------------------------------------------------------  IN: 0 mL / OUT: 600 mL / NET: -600 mL        MEDICATIONS  (STANDING):  albuterol/ipratropium for Nebulization 3 milliLiter(s) Nebulizer every 8 hours  amLODIPine   Tablet 10 milliGRAM(s) Oral daily  apixaban 5 milliGRAM(s) Oral every 12 hours  fluticasone propionate 50 MICROgram(s)/spray Nasal Spray 1 Spray(s) Both Nostrils two times a day  furosemide   Injectable 40 milliGRAM(s) IV Push daily  losartan 100 milliGRAM(s) Oral daily  montelukast 10 milliGRAM(s) Oral daily  predniSONE   Tablet 40 milliGRAM(s) Oral daily  sildenafil (REVATIO) 20 milliGRAM(s) Oral three times a day    MEDICATIONS  (PRN):  melatonin 3 milliGRAM(s) Oral at bedtime PRN Insomnia      Constitutional: denies fevers, chills, night sweats, weight loss  HEENT: denies visual changes, cough  Cardiovascular: denies palpitations, chest pain, edema  Respiratory: denies SOB, wheezing  Gastrointestinal: denies N/V/D, abdominal pain, hematochezia, melena  : denies dysuria, urinary urgency, increased frequency  MSK: denies muscle weakness, joint pain  Skin: denies new rashes or masses  Heme: denies bleeding, bruising  Neuro: denies headache, weakness    PHYSICAL EXAM:   GEN: Age appropriate, resting comfortably in bed, no acute distress, non toxic appearing, speaking in complete sentences.   HEENT: Conjunctiva and sclera normal  PULM: Lungs CTAB, no wheezes, rales, rhonchi  CV: RRR, S1S2, no MRG  MSK: no stiffness or joint effusions  Abdominal: Soft, nontender to palpation, non-distended, +BS  Extremities: Significant lower extremity up to the knees  NEURO: AAOx3  Psych: normal affect, normal behavior  Skin: No rashes, lesions    LABS:  Labs personally reviewed.                        13.0   7.25  )-----------( 243      ( 20 Jan 2023 07:15 )             46.2     Hgb Trend: 13.0<--, 11.9<--, 11.9<--  01-20    146<H>  |  91<L>  |  25<H>  ----------------------------<  88  3.7   |  46<HH>  |  0.88    Ca    10.0      20 Jan 2023 07:15  Phos  3.4     01-20  Mg     2.70     01-20    TPro  7.8  /  Alb  4.4  /  TBili  0.6  /  DBili  x   /  AST  26  /  ALT  41<H>  /  AlkPhos  169<H>  01-20    Creatinine Trend: 0.88<--, 0.99<--, 0.62<--          [facility]  Pulmonary and Critical Care Fellow    PGY-5 Pager: [phone]  [phone]  [facility] Pulmonary Spectra [phone]   Night Float:

## 2023-01-20 NOTE — DISCHARGE NOTE PROVIDER - NSDCMRMEDTOKEN_GEN_ALL_CORE_FT
Aldactone 25 mg oral tablet: 1 tab(s) orally Monday through Friday  amLODIPine 10 mg oral tablet: 1 tab(s) orally once a day  apixaban 5 mg oral tablet: 1 tab(s) orally 2 times a day  furosemide 40 mg oral tablet: 1 tab(s) orally once a day  losartan 100 mg oral tablet: 1 tab(s) orally once a day  montelukast 10 mg oral tablet: 1 tab(s) orally once a day  Opsumit 10 mg oral tablet: 1 tab(s) orally once a day  sildenafil 20 mg oral tablet: 1 tab(s) orally 3 times a day   Aldactone 25 mg oral tablet: 1 tab(s) orally Monday through Friday  amLODIPine 10 mg oral tablet: 1 tab(s) orally once a day  apixaban 5 mg oral tablet: 1 tab(s) orally 2 times a day  AVAPS: AVAPS at nighttime:    Tidal volume 390mL  EPAP 7 cm H20  IPAP 12-25 cm H20  furosemide 40 mg oral tablet: 1 tab(s) orally once a day  losartan 100 mg oral tablet: 1 tab(s) orally once a day  montelukast 10 mg oral tablet: 1 tab(s) orally once a day  Opsumit 10 mg oral tablet: 1 tab(s) orally once a day  sildenafil 20 mg oral tablet: 1 tab(s) orally 3 times a day   Aldactone 25 mg oral tablet: 1 tab(s) orally Monday through Friday  amLODIPine 10 mg oral tablet: 1 tab(s) orally once a day  apixaban 5 mg oral tablet: 1 tab(s) orally 2 times a day  AVAPS: AVAPS at nighttime:    Tidal volume 390mL  EPAP 7 cm H20  IPAP 12-25 cm H20.  furosemide 40 mg oral tablet: 1 tab(s) orally once a day  losartan 100 mg oral tablet: 1 tab(s) orally once a day  montelukast 10 mg oral tablet: 1 tab(s) orally once a day  Opsumit 10 mg oral tablet: 1 tab(s) orally once a day  sildenafil 20 mg oral tablet: 1 tab(s) orally 3 times a day   Aldactone 25 mg oral tablet: 1 tab(s) orally Monday through Friday  apixaban 5 mg oral tablet: 1 tab(s) orally 2 times a day  AVAPS: AVAPS at nighttime:    Tidal volume 390mL  EPAP 7 cm H20  IPAP 12-25 cm H20  fluticasone 50 mcg/inh nasal spray: 1 spray(s) nasal 2 times a day  montelukast 10 mg oral tablet: 1 tab(s) orally once a day  sildenafil 20 mg oral tablet: 1 tab(s) orally 3 times a day

## 2023-01-20 NOTE — DISCHARGE NOTE PROVIDER - NSDCCPCAREPLAN_GEN_ALL_CORE_FT
PRINCIPAL DISCHARGE DIAGNOSIS  Diagnosis: Acute on chronic respiratory failure with hypoxia and hypercapnia  Assessment and Plan of Treatment: When you came into the hospital, we found that your Co2 levels were very high. We suspect that it was in the setting of your viral infection that made your lung conditions worse. We gave you steroids to calm the inflammation, and started you on bipap for the first night. It was too much pressures, so we weaned you to AVAPs, which was set too high. We then played around with the machine settings at night, and settled on discharging you home on AVAPs, which has been shown to reduce the carbon dioxide in your body. When you go home, please continue to use the AVAPs at night. Please follow up with Dr Byrne in 1-2 weeks after leaving the hospital.      SECONDARY DISCHARGE DIAGNOSES  Diagnosis: Hypertension  Assessment and Plan of Treatment: When you came into the hospital, you were taking blood pressure medicine. Because you had a day in the hospital where your blood pressure was low, we stopped the blood medicine, and your blood pressure was normal in the hospital. When you go home, please continue to not take the blood pressure medicine. Measure your blood pressure at home, and if it is consistently over 140s/80s, please contact your primary care doctor to resume the blood pressure medicine.    Diagnosis: Pulmonary hypertension  Assessment and Plan of Treatment: When you came into the hospital, you were taking Opsimut and sildenafil for the pulmonary hypertension. The pulmonologists stopped the Opsimut that you were taking in the hospital, so when you go home, only take the sildenafil and follow up with Dr Byrne in 1-2 weeks after leaving the hospital.

## 2023-01-21 DIAGNOSIS — I10 ESSENTIAL (PRIMARY) HYPERTENSION: ICD-10-CM

## 2023-01-21 DIAGNOSIS — I95.1 ORTHOSTATIC HYPOTENSION: ICD-10-CM

## 2023-01-21 LAB
ALBUMIN SERPL ELPH-MCNC: 4 G/DL — SIGNIFICANT CHANGE UP (ref 3.3–5)
ALP SERPL-CCNC: 141 U/L — HIGH (ref 40–120)
ALT FLD-CCNC: 28 U/L — SIGNIFICANT CHANGE UP (ref 4–33)
ANION GAP SERPL CALC-SCNC: 8 MMOL/L — SIGNIFICANT CHANGE UP (ref 7–14)
APPEARANCE UR: CLEAR — SIGNIFICANT CHANGE UP
AST SERPL-CCNC: 18 U/L — SIGNIFICANT CHANGE UP (ref 4–32)
B PERT DNA SPEC QL NAA+PROBE: SIGNIFICANT CHANGE UP
B PERT+PARAPERT DNA PNL SPEC NAA+PROBE: SIGNIFICANT CHANGE UP
BACTERIA # UR AUTO: ABNORMAL
BILIRUB SERPL-MCNC: 0.4 MG/DL — SIGNIFICANT CHANGE UP (ref 0.2–1.2)
BILIRUB UR-MCNC: NEGATIVE — SIGNIFICANT CHANGE UP
BORDETELLA PARAPERTUSSIS (RAPRVP): SIGNIFICANT CHANGE UP
BUN SERPL-MCNC: 35 MG/DL — HIGH (ref 7–23)
C PNEUM DNA SPEC QL NAA+PROBE: SIGNIFICANT CHANGE UP
CALCIUM SERPL-MCNC: 9.2 MG/DL — SIGNIFICANT CHANGE UP (ref 8.4–10.5)
CHLORIDE SERPL-SCNC: 93 MMOL/L — LOW (ref 98–107)
CO2 SERPL-SCNC: 46 MMOL/L — CRITICAL HIGH (ref 22–31)
COLOR SPEC: YELLOW — SIGNIFICANT CHANGE UP
COMMENT - URINE: SIGNIFICANT CHANGE UP
CREAT SERPL-MCNC: 1.03 MG/DL — SIGNIFICANT CHANGE UP (ref 0.5–1.3)
DIFF PNL FLD: NEGATIVE — SIGNIFICANT CHANGE UP
EGFR: 64 ML/MIN/1.73M2 — SIGNIFICANT CHANGE UP
EPI CELLS # UR: SIGNIFICANT CHANGE UP
FLUAV SUBTYP SPEC NAA+PROBE: SIGNIFICANT CHANGE UP
FLUBV RNA SPEC QL NAA+PROBE: SIGNIFICANT CHANGE UP
GAS PNL BLDV: SIGNIFICANT CHANGE UP
GLUCOSE SERPL-MCNC: 97 MG/DL — SIGNIFICANT CHANGE UP (ref 70–99)
GLUCOSE UR QL: NEGATIVE — SIGNIFICANT CHANGE UP
HADV DNA SPEC QL NAA+PROBE: SIGNIFICANT CHANGE UP
HCOV 229E RNA SPEC QL NAA+PROBE: SIGNIFICANT CHANGE UP
HCOV HKU1 RNA SPEC QL NAA+PROBE: SIGNIFICANT CHANGE UP
HCOV NL63 RNA SPEC QL NAA+PROBE: SIGNIFICANT CHANGE UP
HCOV OC43 RNA SPEC QL NAA+PROBE: SIGNIFICANT CHANGE UP
HCT VFR BLD CALC: 40.9 % — SIGNIFICANT CHANGE UP (ref 34.5–45)
HGB BLD-MCNC: 11.7 G/DL — SIGNIFICANT CHANGE UP (ref 11.5–15.5)
HMPV RNA SPEC QL NAA+PROBE: SIGNIFICANT CHANGE UP
HPIV1 RNA SPEC QL NAA+PROBE: SIGNIFICANT CHANGE UP
HPIV2 RNA SPEC QL NAA+PROBE: SIGNIFICANT CHANGE UP
HPIV3 RNA SPEC QL NAA+PROBE: SIGNIFICANT CHANGE UP
HPIV4 RNA SPEC QL NAA+PROBE: SIGNIFICANT CHANGE UP
KETONES UR-MCNC: NEGATIVE — SIGNIFICANT CHANGE UP
LEUKOCYTE ESTERASE UR-ACNC: ABNORMAL
M PNEUMO DNA SPEC QL NAA+PROBE: SIGNIFICANT CHANGE UP
MAGNESIUM SERPL-MCNC: 2.7 MG/DL — HIGH (ref 1.6–2.6)
MCHC RBC-ENTMCNC: 27.5 PG — SIGNIFICANT CHANGE UP (ref 27–34)
MCHC RBC-ENTMCNC: 28.6 GM/DL — LOW (ref 32–36)
MCV RBC AUTO: 96 FL — SIGNIFICANT CHANGE UP (ref 80–100)
NITRITE UR-MCNC: NEGATIVE — SIGNIFICANT CHANGE UP
NRBC # BLD: 0 /100 WBCS — SIGNIFICANT CHANGE UP (ref 0–0)
NRBC # FLD: 0 K/UL — SIGNIFICANT CHANGE UP (ref 0–0)
PH UR: 5.5 — SIGNIFICANT CHANGE UP (ref 5–8)
PHOSPHATE SERPL-MCNC: 4.6 MG/DL — HIGH (ref 2.5–4.5)
PLATELET # BLD AUTO: 244 K/UL — SIGNIFICANT CHANGE UP (ref 150–400)
POTASSIUM SERPL-MCNC: 3.6 MMOL/L — SIGNIFICANT CHANGE UP (ref 3.5–5.3)
POTASSIUM SERPL-SCNC: 3.6 MMOL/L — SIGNIFICANT CHANGE UP (ref 3.5–5.3)
PROT SERPL-MCNC: 6.9 G/DL — SIGNIFICANT CHANGE UP (ref 6–8.3)
PROT UR-MCNC: ABNORMAL
RAPID RVP RESULT: SIGNIFICANT CHANGE UP
RBC # BLD: 4.26 M/UL — SIGNIFICANT CHANGE UP (ref 3.8–5.2)
RBC # FLD: 14.2 % — SIGNIFICANT CHANGE UP (ref 10.3–14.5)
RBC CASTS # UR COMP ASSIST: SIGNIFICANT CHANGE UP /HPF (ref 0–4)
RSV RNA SPEC QL NAA+PROBE: SIGNIFICANT CHANGE UP
RV+EV RNA SPEC QL NAA+PROBE: SIGNIFICANT CHANGE UP
SARS-COV-2 RNA SPEC QL NAA+PROBE: SIGNIFICANT CHANGE UP
SODIUM SERPL-SCNC: 147 MMOL/L — HIGH (ref 135–145)
SP GR SPEC: 1.01 — SIGNIFICANT CHANGE UP (ref 1.01–1.05)
UROBILINOGEN FLD QL: SIGNIFICANT CHANGE UP
WBC # BLD: 6.62 K/UL — SIGNIFICANT CHANGE UP (ref 3.8–10.5)
WBC # FLD AUTO: 6.62 K/UL — SIGNIFICANT CHANGE UP (ref 3.8–10.5)
WBC UR QL: SIGNIFICANT CHANGE UP /HPF (ref 0–5)

## 2023-01-21 PROCEDURE — 71045 X-RAY EXAM CHEST 1 VIEW: CPT | Mod: 26

## 2023-01-21 PROCEDURE — 99233 SBSQ HOSP IP/OBS HIGH 50: CPT | Mod: GC

## 2023-01-21 RX ORDER — MIDODRINE HYDROCHLORIDE 2.5 MG/1
5 TABLET ORAL ONCE
Refills: 0 | Status: COMPLETED | OUTPATIENT
Start: 2023-01-21 | End: 2023-01-21

## 2023-01-21 RX ADMIN — Medication 40 MILLIGRAM(S): at 06:05

## 2023-01-21 RX ADMIN — Medication 1 SPRAY(S): at 18:14

## 2023-01-21 RX ADMIN — MONTELUKAST 10 MILLIGRAM(S): 4 TABLET, CHEWABLE ORAL at 12:01

## 2023-01-21 RX ADMIN — LOSARTAN POTASSIUM 100 MILLIGRAM(S): 100 TABLET, FILM COATED ORAL at 06:05

## 2023-01-21 RX ADMIN — Medication 20 MILLIGRAM(S): at 14:15

## 2023-01-21 RX ADMIN — MIDODRINE HYDROCHLORIDE 5 MILLIGRAM(S): 2.5 TABLET ORAL at 10:12

## 2023-01-21 RX ADMIN — Medication 3 MILLILITER(S): at 16:17

## 2023-01-21 RX ADMIN — Medication 1 SPRAY(S): at 06:06

## 2023-01-21 RX ADMIN — Medication 20 MILLIGRAM(S): at 06:04

## 2023-01-21 RX ADMIN — Medication 3 MILLILITER(S): at 22:00

## 2023-01-21 RX ADMIN — AMLODIPINE BESYLATE 10 MILLIGRAM(S): 2.5 TABLET ORAL at 06:04

## 2023-01-21 RX ADMIN — Medication 20 MILLIGRAM(S): at 21:28

## 2023-01-21 RX ADMIN — Medication 3 MILLILITER(S): at 08:41

## 2023-01-21 RX ADMIN — APIXABAN 5 MILLIGRAM(S): 2.5 TABLET, FILM COATED ORAL at 06:05

## 2023-01-21 RX ADMIN — APIXABAN 5 MILLIGRAM(S): 2.5 TABLET, FILM COATED ORAL at 18:14

## 2023-01-21 NOTE — PROGRESS NOTE ADULT - PROBLEM SELECTOR PLAN 5
, ALT 61, AST 32  Possibly i/s/o hepatic congestion from ADHF  - Trend CMP  - If liver enzymes remain elevated, can conduct further work up with CT or US imaging - Telemetry w/ continuous pulse ox  - Continue home sildenafil  - d/c home Opsumit per pulm  - Diuresis as per above  - f/u TTE  - Hold home aldactone for now while being diuresed with IV Lasix as above

## 2023-01-21 NOTE — PROGRESS NOTE ADULT - PROBLEM SELECTOR PLAN 4
- Telemetry w/ continuous pulse ox  - Continue home sildenafil  - d/c home Opsumit per pulm  - Diuresis as per above  - f/u TTE  - Hold home aldactone for now while being diuresed with IV Lasix as above Hypotensive on 1/21 likely orthostatic    - Losartan with hold parameters for SBP<120  - Amlodipine with hold parameters for SBP <120  - Can consider discontinuing meds if pt remains hypotensive, if pt becomes hypertensive can change hold parameters Hypotensive on 1/21 likely orthostatic    - Losartan decreased from 100 to 25 mg QD  - Amlodipine held

## 2023-01-21 NOTE — PROVIDER CONTACT NOTE (OTHER) - ASSESSMENT
patient c/o dizzy, no chest pain, patient is on oxygen 4L/min via nasal canula, no active bleeding noted. BP: 89/45, HR 92, RR 20, O2sat 98% with oxygen. patient is unable to tolerate lower the head of bed. vaseline applied to the nose.
patient is on oxygen 4L/min via n/c, desaturation to 86% during talking, no s/s acute distress noted
Patient is A&Ox4, pain, chest pain, shortness of breath, nausea, vomiting or dizziness.

## 2023-01-21 NOTE — PROGRESS NOTE ADULT - PROBLEM SELECTOR PLAN 7
Pt with history of multiple PEs and LLE DVT, with 2 PEs with no clear inciting cause.  - Continue home Eliquis 5 mg BID for now  - CTA chest with IV contrast no new PE on home cpap at night at home with poor compliance     - advised pt to bring home cpap machine nasal if she doesn't feel comfortable with hospital bipap/cpap masks  - now on AVAPS per pulm recs: , EPAP 7, IPAP 12-25

## 2023-01-21 NOTE — PROGRESS NOTE ADULT - PROBLEM SELECTOR PLAN 6
on home cpap at night at home with poor compliance     - advised pt to bring home cpap machine nasal if she doesn't feel comfortable with hospital bipap/cpap masks  - now on AVAPS per pulm recs: , EPAP 7, IPAP 12-25 , ALT 61, AST 32  Possibly i/s/o hepatic congestion from ADHF  - Trend CMP  - If liver enzymes remain elevated, can conduct further work up with CT or US imaging

## 2023-01-21 NOTE — PROGRESS NOTE ADULT - PROBLEM SELECTOR PLAN 3
BPs in acceptable range on admission    - Continue home losartan  - Continue home amlodipine Episode of hypotension on 1/21 AM while feeling dizzy sitting upright, afebrile, HR in 90s, and oxygen requirements at pt's baseline   Limited orthostatics as pt lays flat is SOB and cannot lay flat for long  Has been receiving lasix 40 IV QD, only had output of about 450 net negative  Given overt hypervolemia, held off on further IVF for now, gave stat dose of midodrine 5 mg x1  Compression stockings  Increased hold parameters for antihypertensive medication, therefore would hold for SBP <120 Episode of hypotension on 1/21 AM while feeling dizzy sitting upright, afebrile, HR in 90s, and oxygen requirements at pt's baseline   Limited orthostatics as pt lays flat is SOB and cannot lay flat for long  Has been receiving lasix 40 IV QD, only had output of about 450 net negative  Given overt hypervolemia, held off on further IVF for now, gave stat dose of midodrine 5 mg x1  - Compression stockings  - Losartan decreased from 100 to 25 mg QD and amlodipine held

## 2023-01-21 NOTE — PROVIDER CONTACT NOTE (OTHER) - RECOMMENDATIONS
provider aware
As per provider, patient can be transported to floor. No further interventions at this time.
provider aware

## 2023-01-21 NOTE — PROVIDER CONTACT NOTE (OTHER) - ACTION/TREATMENT ORDERED:
provider aware, will aware provider when O2 sat less than 85 or change in symptoms
patient can be transported to floor. No further interventions at this time.
provider aware, closely monitoring

## 2023-01-21 NOTE — PROGRESS NOTE ADULT - PROBLEM SELECTOR PLAN 8
DVT ppx: Home Eliquis  Diet: DASH/TLC diet while off bipap  Dispo: Pending intervention Pt with history of multiple PEs and LLE DVT, with 2 PEs with no clear inciting cause.  - Continue home Eliquis 5 mg BID for now  - CTA chest with IV contrast no new PE

## 2023-01-21 NOTE — PROVIDER CONTACT NOTE (OTHER) - SITUATION
Patient BP 91/81 and needs to be transport to floor
patient c/o dizzy, low BP, dry blood from nostrils
patient was noted with O2 sat 87-93% on tele

## 2023-01-21 NOTE — PROVIDER CONTACT NOTE (OTHER) - REASON
c/o dizzy, low BP, dry blood from nostrils
desaturation not sustaining
Patient BP 91/81 and needs to be transported to floor

## 2023-01-21 NOTE — PROGRESS NOTE ADULT - ATTENDING COMMENTS
57F PMH morbid obesity, HTN, pulm HTN, multiple PEs and LLE DVT on Eliquis, COPD on home oxygen 3L NC, OLAMIDE on nocturnal BiPAP who presented with acute on chronic hypoxemic & hypercapnic respiratory failure, likely i/s/o chronic OHS, OLAMIDE, pulmonary hypertension, COPD. Likely hypercapnic resp failure in setting of viral infection exacerbating COPD vs pulm HTN. Pulm consulted.     Patient seen and examined at bedside. Reports feeling ok. Was feeling dizzy this morning after eating and was found to have low BP at that time. Was only on avaps for about 2 hours overnight because she was taken off it to use bathroom and was not placed back on it afterwards. Has cough today. Trying to move around the bed. Lung exam CTAB, no wheezes. Cardiac exam RRR, no murmur.     #acute on chronic respiratory failure with hypercapnea: on prednisone and nebs. Pulm following. c/w avaps at night. Will need avaps at home on dc. Echo shows EF 62%, grossly normal LV function. RV not well visualized. CTA chest no PE. RVP negative on admission. Will repeat RVP given new cough    #pulmHTN: holding home opsumit per pulm. holding aldactone while on IV lasix. Consider additional lasix if BP allows.    #HTN: decreased home BP meds given low BP this morning. Will also need to make room for possible additional lasix    #Elevated LFTs: now slowly improving. Continue to monitor    #OLAMIDE on home cpap: will need avaps as above    #Hx PE: c/w home eliquis    Discussed with Team 4. 57F PMH morbid obesity, HTN, pulm HTN, multiple PEs and LLE DVT on Eliquis, COPD on home oxygen 3L NC, OLAMIDE on nocturnal BiPAP who presented with acute on chronic hypoxemic & hypercapnic respiratory failure, likely i/s/o chronic OHS, OLAMIDE, pulmonary hypertension, COPD. Likely hypercapnic resp failure in setting of viral infection exacerbating COPD vs pulm HTN. Pulm consulted.     Patient seen and examined at bedside. Reports feeling ok. Was feeling dizzy this morning after eating and was found to have low BP at that time. Was only on avaps for about 2 hours overnight because she was taken off it to use bathroom and was not placed back on it afterwards. Has cough today. Trying to move around the bed. Lung exam CTAB, no wheezes. Cardiac exam RRR, no murmur.     #acute on chronic respiratory failure with hypercapnea: on prednisone and nebs. Pulm following. c/w avaps at night. Will need avaps at home on dc. Echo shows EF 62%, grossly normal LV function. RV not well visualized. CTA chest no PE. RVP negative on admission. Will repeat RVP given new cough    #pulmHTN: holding home opsumit per pulm. holding aldactone while on IV lasix. Consider additional lasix if BP allows.    #HTN: holding home BP meds given low BP this morning. Will also need to make room for possible additional lasix    #Elevated LFTs: now slowly improving. Continue to monitor    #OLAMIDE on home cpap: will need avaps as above    #Hx PE: c/w home eliquis    Discussed with Team 4.

## 2023-01-21 NOTE — PROGRESS NOTE ADULT - SUBJECTIVE AND OBJECTIVE BOX
Rina Puga MD  PGY 3 Department of Internal Medicine      Patient is a 57y old  Female who presents with a chief complaint of Shortness of breath (20 Jan 2023 15:05)      SUBJECTIVE / OVERNIGHT EVENTS: Pt seen and examined. No acute overnight events.   Denies fevers, chills, CP/palpitations, SOB/cough, abdominal pain, N/V, constipation, or diarrhea.        MEDICATIONS  (STANDING):  albuterol/ipratropium for Nebulization 3 milliLiter(s) Nebulizer every 8 hours  amLODIPine   Tablet 10 milliGRAM(s) Oral daily  apixaban 5 milliGRAM(s) Oral every 12 hours  fluticasone propionate 50 MICROgram(s)/spray Nasal Spray 1 Spray(s) Both Nostrils two times a day  furosemide   Injectable 40 milliGRAM(s) IV Push daily  losartan 100 milliGRAM(s) Oral daily  montelukast 10 milliGRAM(s) Oral daily  predniSONE   Tablet 40 milliGRAM(s) Oral daily  sildenafil (REVATIO) 20 milliGRAM(s) Oral three times a day    MEDICATIONS  (PRN):  melatonin 3 milliGRAM(s) Oral at bedtime PRN Insomnia      I&O's Summary    20 Jan 2023 07:01  -  21 Jan 2023 07:00  --------------------------------------------------------  IN: 150 mL / OUT: 600 mL / NET: -450 mL        Vital Signs Last 24 Hrs  T(C): 36.9 (20 Jan 2023 20:40), Max: 36.9 (20 Jan 2023 09:00)  T(F): 98.4 (20 Jan 2023 20:40), Max: 98.4 (20 Jan 2023 09:00)  HR: 91 (20 Jan 2023 23:54) (88 - 103)  BP: 107/71 (20 Jan 2023 20:40) (107/69 - 115/69)  BP(mean): --  RR: 20 (20 Jan 2023 20:40) (17 - 20)  SpO2: 99% (20 Jan 2023 23:54) (94% - 100%)    Parameters below as of 20 Jan 2023 20:40  Patient On (Oxygen Delivery Method): nasal cannula  O2 Flow (L/min): 4      CAPILLARY BLOOD GLUCOSE          PHYSICAL EXAM:  GENERAL: NAD,   HEAD:  Atraumatic, Normocephalic  EYES: EOMI, PERRL, conjunctiva and sclera clear  NECK: No JVD  CHEST/LUNG: Clear to auscultation bilaterally; No wheeze  HEART: Regular rate and rhythm; No murmurs, rubs, or gallops  ABDOMEN: Soft, Nontender, Nondistended; Bowel sounds present  EXTREMITIES:  2+ Peripheral Pulses, No clubbing, cyanosis, or edema  PSYCH: AAOx3  NEUROLOGY: non-focal  SKIN: No rashes or lesions       LABS:                        13.0   7.25  )-----------( 243      ( 20 Jan 2023 07:15 )             46.2     Auto Eosinophil # x     / Auto Eosinophil % x     / Auto Neutrophil # x     / Auto Neutrophil % x     / BANDS % x        01-20    146<H>  |  91<L>  |  25<H>  ----------------------------<  88  3.7   |  46<HH>  |  0.88    Ca    10.0      20 Jan 2023 07:15  Mg     2.70     01-20  Phos  3.4     01-20  TPro  7.8  /  Alb  4.4  /  TBili  0.6  /  DBili  x   /  AST  26  /  ALT  41<H>  /  AlkPhos  169<H>  01-20                   Rina Puga MD  PGY 3 Department of Internal Medicine      Patient is a 57y old  Female who presents with a chief complaint of Shortness of breath (20 Jan 2023 15:05)      SUBJECTIVE / OVERNIGHT EVENTS: Pt seen and examined. No acute overnight events. This morning the pt was feeling well when seen at bedside, however later in AM notified that pt felt dizzy and when blood pressure was checked it dropped in blood pressure to 89/45 (however charting appears blood pressure dropped prior to 71/35, therefore BP increased to 89/45 without intervention), pt noted to be sitting upright at the time and dizzy. When attempting to assess orthostatics unable to lay pt flat given shortness of breath when doing such. Afebrile, HR in 90s, and remaining with baseline oxygen saturation to 95% on 4L NC. Pt given stat dose of midodrine 5 mg x1.       MEDICATIONS  (STANDING):  albuterol/ipratropium for Nebulization 3 milliLiter(s) Nebulizer every 8 hours  amLODIPine   Tablet 10 milliGRAM(s) Oral daily  apixaban 5 milliGRAM(s) Oral every 12 hours  fluticasone propionate 50 MICROgram(s)/spray Nasal Spray 1 Spray(s) Both Nostrils two times a day  furosemide   Injectable 40 milliGRAM(s) IV Push daily  losartan 100 milliGRAM(s) Oral daily  montelukast 10 milliGRAM(s) Oral daily  predniSONE   Tablet 40 milliGRAM(s) Oral daily  sildenafil (REVATIO) 20 milliGRAM(s) Oral three times a day    MEDICATIONS  (PRN):  melatonin 3 milliGRAM(s) Oral at bedtime PRN Insomnia      I&O's Summary    20 Jan 2023 07:01  -  21 Jan 2023 07:00  --------------------------------------------------------  IN: 150 mL / OUT: 600 mL / NET: -450 mL        Vital Signs Last 24 Hrs  T(C): 36.9 (20 Jan 2023 20:40), Max: 36.9 (20 Jan 2023 09:00)  T(F): 98.4 (20 Jan 2023 20:40), Max: 98.4 (20 Jan 2023 09:00)  HR: 91 (20 Jan 2023 23:54) (88 - 103)  BP: 107/71 (20 Jan 2023 20:40) (107/69 - 115/69)  BP(mean): --  RR: 20 (20 Jan 2023 20:40) (17 - 20)  SpO2: 99% (20 Jan 2023 23:54) (94% - 100%)    Parameters below as of 20 Jan 2023 20:40  Patient On (Oxygen Delivery Method): nasal cannula  O2 Flow (L/min): 4      CAPILLARY BLOOD GLUCOSE          PHYSICAL EXAM:  GENERAL: NAD   HEAD:  Atraumatic, Normocephalic  EYES: EOMI, PERRL, conjunctiva and sclera clear  CHEST/LUNG: Clear to auscultation bilaterally; Unable to appreciate wheezes or crackles, however limited given body habitus  HEART: Regular rate and rhythm; No murmurs, rubs, or gallops  ABDOMEN: Soft, Nontender, Nondistended; Bowel sounds present  EXTREMITIES:  2+ Peripheral Pulses. 3+ bilateral pitting edema  PSYCH: AAOx4  NEUROLOGY: non-focal  SKIN: No rashes or lesions       LABS:                        13.0   7.25  )-----------( 243      ( 20 Jan 2023 07:15 )             46.2     Auto Eosinophil # x     / Auto Eosinophil % x     / Auto Neutrophil # x     / Auto Neutrophil % x     / BANDS % x        01-20    146<H>  |  91<L>  |  25<H>  ----------------------------<  88  3.7   |  46<HH>  |  0.88    Ca    10.0      20 Jan 2023 07:15  Mg     2.70     01-20  Phos  3.4     01-20  TPro  7.8  /  Alb  4.4  /  TBili  0.6  /  DBili  x   /  AST  26  /  ALT  41<H>  /  AlkPhos  169<H>  01-20

## 2023-01-22 LAB
ALBUMIN SERPL ELPH-MCNC: 3.8 G/DL — SIGNIFICANT CHANGE UP (ref 3.3–5)
ALP SERPL-CCNC: 122 U/L — HIGH (ref 40–120)
ALT FLD-CCNC: 23 U/L — SIGNIFICANT CHANGE UP (ref 4–33)
ANION GAP SERPL CALC-SCNC: 4 MMOL/L — LOW (ref 7–14)
AST SERPL-CCNC: 17 U/L — SIGNIFICANT CHANGE UP (ref 4–32)
BILIRUB SERPL-MCNC: 0.4 MG/DL — SIGNIFICANT CHANGE UP (ref 0.2–1.2)
BUN SERPL-MCNC: 36 MG/DL — HIGH (ref 7–23)
CALCIUM SERPL-MCNC: 9.2 MG/DL — SIGNIFICANT CHANGE UP (ref 8.4–10.5)
CHLORIDE SERPL-SCNC: 93 MMOL/L — LOW (ref 98–107)
CO2 SERPL-SCNC: 50 MMOL/L — CRITICAL HIGH (ref 22–31)
CREAT SERPL-MCNC: 0.97 MG/DL — SIGNIFICANT CHANGE UP (ref 0.5–1.3)
EGFR: 69 ML/MIN/1.73M2 — SIGNIFICANT CHANGE UP
GAS PNL BLDV: SIGNIFICANT CHANGE UP
GLUCOSE SERPL-MCNC: 84 MG/DL — SIGNIFICANT CHANGE UP (ref 70–99)
HCT VFR BLD CALC: 40.3 % — SIGNIFICANT CHANGE UP (ref 34.5–45)
HGB BLD-MCNC: 11.3 G/DL — LOW (ref 11.5–15.5)
MAGNESIUM SERPL-MCNC: 3 MG/DL — HIGH (ref 1.6–2.6)
MCHC RBC-ENTMCNC: 27.5 PG — SIGNIFICANT CHANGE UP (ref 27–34)
MCHC RBC-ENTMCNC: 28 GM/DL — LOW (ref 32–36)
MCV RBC AUTO: 98.1 FL — SIGNIFICANT CHANGE UP (ref 80–100)
NRBC # BLD: 0 /100 WBCS — SIGNIFICANT CHANGE UP (ref 0–0)
NRBC # FLD: 0 K/UL — SIGNIFICANT CHANGE UP (ref 0–0)
PHOSPHATE SERPL-MCNC: 4.2 MG/DL — SIGNIFICANT CHANGE UP (ref 2.5–4.5)
PLATELET # BLD AUTO: 211 K/UL — SIGNIFICANT CHANGE UP (ref 150–400)
POTASSIUM SERPL-MCNC: 3.5 MMOL/L — SIGNIFICANT CHANGE UP (ref 3.5–5.3)
POTASSIUM SERPL-SCNC: 3.5 MMOL/L — SIGNIFICANT CHANGE UP (ref 3.5–5.3)
PROT SERPL-MCNC: 6.4 G/DL — SIGNIFICANT CHANGE UP (ref 6–8.3)
RBC # BLD: 4.11 M/UL — SIGNIFICANT CHANGE UP (ref 3.8–5.2)
RBC # FLD: 14.5 % — SIGNIFICANT CHANGE UP (ref 10.3–14.5)
SODIUM SERPL-SCNC: 147 MMOL/L — HIGH (ref 135–145)
WBC # BLD: 6.72 K/UL — SIGNIFICANT CHANGE UP (ref 3.8–10.5)
WBC # FLD AUTO: 6.72 K/UL — SIGNIFICANT CHANGE UP (ref 3.8–10.5)

## 2023-01-22 PROCEDURE — 99233 SBSQ HOSP IP/OBS HIGH 50: CPT | Mod: GC

## 2023-01-22 PROCEDURE — 99233 SBSQ HOSP IP/OBS HIGH 50: CPT

## 2023-01-22 RX ORDER — IPRATROPIUM/ALBUTEROL SULFATE 18-103MCG
3 AEROSOL WITH ADAPTER (GRAM) INHALATION EVERY 6 HOURS
Refills: 0 | Status: DISCONTINUED | OUTPATIENT
Start: 2023-01-22 | End: 2023-01-26

## 2023-01-22 RX ORDER — CARBAMIDE PEROXIDE 81.86 MG/ML
5 SOLUTION/ DROPS AURICULAR (OTIC)
Refills: 0 | Status: DISCONTINUED | OUTPATIENT
Start: 2023-01-22 | End: 2023-01-26

## 2023-01-22 RX ORDER — POTASSIUM CHLORIDE 20 MEQ
40 PACKET (EA) ORAL ONCE
Refills: 0 | Status: COMPLETED | OUTPATIENT
Start: 2023-01-22 | End: 2023-01-22

## 2023-01-22 RX ADMIN — Medication 40 MILLIGRAM(S): at 05:21

## 2023-01-22 RX ADMIN — Medication 20 MILLIGRAM(S): at 05:19

## 2023-01-22 RX ADMIN — Medication 3 MILLIGRAM(S): at 22:20

## 2023-01-22 RX ADMIN — Medication 3 MILLILITER(S): at 07:25

## 2023-01-22 RX ADMIN — Medication 20 MILLIGRAM(S): at 15:20

## 2023-01-22 RX ADMIN — APIXABAN 5 MILLIGRAM(S): 2.5 TABLET, FILM COATED ORAL at 17:43

## 2023-01-22 RX ADMIN — MONTELUKAST 10 MILLIGRAM(S): 4 TABLET, CHEWABLE ORAL at 11:47

## 2023-01-22 RX ADMIN — CARBAMIDE PEROXIDE 5 DROP(S): 81.86 SOLUTION/ DROPS AURICULAR (OTIC) at 22:19

## 2023-01-22 RX ADMIN — Medication 1 SPRAY(S): at 17:41

## 2023-01-22 RX ADMIN — Medication 40 MILLIEQUIVALENT(S): at 10:06

## 2023-01-22 RX ADMIN — Medication 20 MILLIGRAM(S): at 22:20

## 2023-01-22 RX ADMIN — APIXABAN 5 MILLIGRAM(S): 2.5 TABLET, FILM COATED ORAL at 05:18

## 2023-01-22 RX ADMIN — Medication 40 MILLIGRAM(S): at 05:18

## 2023-01-22 RX ADMIN — Medication 1 SPRAY(S): at 05:19

## 2023-01-22 NOTE — PROGRESS NOTE ADULT - PROBLEM SELECTOR PLAN 5
- Telemetry w/ continuous pulse ox  - Continue home sildenafil  - d/c home Opsumit per pulm  - Diuresis as per above  - TTE unremarkable   - Hold home aldactone for now while being diuresed with IV Lasix as above

## 2023-01-22 NOTE — PROGRESS NOTE ADULT - ATTENDING COMMENTS
57F with class III obesity, pulmonary hypertension, history of PE on apixaban, COPD on home 3LPM, OLAMIDE/OHS CPAP pending delivery who presents with URI symptoms and near syncopal episode with acute on chronic hypoxemic respiratory failure in setting of acute diastolic heart failure.   - pCO2 on VBG today >100. Her NIV was set to CPAP of 5 cm H2O - she states that RT had changed the settings because pressure felt too high. She requires NIV for hypoventilation. Continue AVAPs  ml, EPAP 7 cm H2O, IPAP 12 cm - 25 cm H2O.  - Check ABG in AM

## 2023-01-22 NOTE — PROGRESS NOTE ADULT - PROBLEM SELECTOR PLAN 1
Patient w/ history of OLAMIDE/OHS, COPD, pulmonary hypertension who presented hypoxic to 60% upon EMS arrival in the field, VBG with pH 7.32 and pCO2 115 on admission, placed on BiPAP, ABG later on with pH 7.42 and pCO2 89.   CXR w/ clear lungs, POCUS w/ b/l B-lines. Likely i/s/o viral infection worsening pHTN vs COPD exacerbation    - Telemetry w/ continuous pulse ox; strict I/Os, daily weights  - S/p IV Lasix 40 mg x2 in ED. Continue IV Lasix 40 mg   - TTE unremarkable   - CTA chest with IV contrast: no PE, upper lobe GGO  - Continue BiPAP 16/8 PRN & qhs  - s/p prednisone 40mg x3d and duonebs now prn  - f/u pulm recs: AVAPS at night

## 2023-01-22 NOTE — PROGRESS NOTE ADULT - ASSESSMENT
57F PMH of pulm HTN (follows with Dr Byrne, on sildenafil, recently started on macitentan 01/03/23), multiple PEs and LLE DVT (on apixaban), COPD on home oxygen 3L NC, OLAMIDE/OHS (on NIV), obesity class III, HTN presents with approx 1 week of lightheadedness and "not feeling herself" and  episodes of forgetfulness and an episode of near syncope at home AM 1/18 found to be in acute on chronic hypoxemic respiratory failure. Pulm consulted for further management.     #Acute on chronic respiratory failure in the setting of ADHF and pHTN:  - patient with worsening peripheral edema and shortness of breath, and acute on chronic hypercapneic respiratory failure  - currently being diuresed with improvement in peripheral edema  - pulmonary reconsulted today for aid in management with patient's hypercapnea    Recommendations:  - patient ordered for AVAPS, however bedside NIV settings are set to CPAP to 5  - please ensure patient receives AVAPS NIV overnight  ml, EPAP 7 cm H2O, IPAP 12 cm - 25 cm H2O  - obtain ABG in the AM on 1/23 (not VBG)  - bicarb noted to be rising in the setting of diuresis, if worsening in AM consider diamox 500mg IV  - c/w sildenafil and hold macicentan

## 2023-01-22 NOTE — PROGRESS NOTE ADULT - PROBLEM SELECTOR PLAN 6
, ALT 61, AST 32    - Trend CMP  - If liver enzymes remain elevated, can conduct further work up with CT or US imaging

## 2023-01-22 NOTE — PROGRESS NOTE ADULT - SUBJECTIVE AND OBJECTIVE BOX
PROGRESS NOTE:     Patient is a 57y old  Female who presents with a chief complaint of Shortness of breath (2023 07:13)      INTERVAL HISTORY: NAEO. VSS. ROS negative. Pt states that she feels better than yesterday. Has been continuing to use incentive spirometer and will try swinging legs out of bed before trying to get out of bed. Used AVAPs overnight until 6am - using nasal piece.     MEDICATIONS  (STANDING):  apixaban 5 milliGRAM(s) Oral every 12 hours  fluticasone propionate 50 MICROgram(s)/spray Nasal Spray 1 Spray(s) Both Nostrils two times a day  furosemide   Injectable 40 milliGRAM(s) IV Push daily  montelukast 10 milliGRAM(s) Oral daily  potassium chloride    Tablet ER 40 milliEquivalent(s) Oral once  sildenafil (REVATIO) 20 milliGRAM(s) Oral three times a day    MEDICATIONS  (PRN):  albuterol/ipratropium for Nebulization 3 milliLiter(s) Nebulizer every 6 hours PRN Shortness of Breath and/or Wheezing  melatonin 3 milliGRAM(s) Oral at bedtime PRN Insomnia      CAPILLARY BLOOD GLUCOSE        I&O's Summary    2023 07:01  -  2023 09:33  --------------------------------------------------------  IN: 0 mL / OUT: 700 mL / NET: -700 mL        PHYSICAL EXAM:  Vital Signs Last 24 Hrs  T(C): 36.9 (2023 05:00), Max: 37.1 (2023 16:00)  T(F): 98.4 (2023 05:00), Max: 98.8 (2023 16:00)  HR: 86 (2023 07:33) (84 - 94)  BP: 115/78 (2023 05:00) (88/55 - 115/78)  BP(mean): --  RR: 20 (2023 05:00) (20 - 20)  SpO2: 92% (2023 07:33) (92% - 95%)    Parameters below as of 2023 07:25  Patient On (Oxygen Delivery Method): nasal cannula w/ humidification        CONSTITUTIONAL: NAD, well-developed  HEENT:  on 4L NC  RESPIRATORY: Normal respiratory effort; lungs are clear to auscultation bilaterally  CARDIOVASCULAR: Regular rate and rhythm, normal S1 and S2, no murmur/rub/gallop; No lower extremity edema; Peripheral pulses are 2+ bilaterally  ABDOMEN: Nontender to palpation, normoactive bowel sounds, no rebound/guarding; No hepatosplenomegaly  MUSCULOSKELETAL: no clubbing or cyanosis of digits; no joint swelling or tenderness to palpation  PSYCH: A+O to person, place, and time; affect appropriate    LABS:                        11.3   6.72  )-----------( 211      ( 2023 05:05 )             40.3         147<H>  |  93<L>  |  36<H>  ----------------------------<  84  3.5   |  50<HH>  |  0.97    Ca    9.2      2023 05:05  Phos  4.2       Mg     3.00         TPro  6.4  /  Alb  3.8  /  TBili  0.4  /  DBili  x   /  AST  17  /  ALT  23  /  AlkPhos  122<H>            Urinalysis Basic - ( 2023 18:35 )    Color: Yellow / Appearance: Clear / S.011 / pH: x  Gluc: x / Ketone: Negative  / Bili: Negative / Urobili: <2 mg/dL   Blood: x / Protein: Trace / Nitrite: Negative   Leuk Esterase: Moderate / RBC: 2-4 /HPF / WBC 8-12 /HPF   Sq Epi: x / Non Sq Epi: Occasional / Bacteria: Few      ******************************  Authored By: Anabell Rowell MD PGY1  Internal Medicine  MS Teams  ******************************

## 2023-01-22 NOTE — PROGRESS NOTE ADULT - PROBLEM SELECTOR PLAN 4
Hypotensive on 1/21 likely orthostatic    - Losartan decreased from 100 to 25 mg QD  - Amlodipine held

## 2023-01-22 NOTE — PROGRESS NOTE ADULT - ATTENDING COMMENTS
57F PMH morbid obesity, HTN, pulm HTN, multiple PEs and LLE DVT on Eliquis, COPD on home oxygen 3L NC, OLAMIDE on nocturnal BiPAP who presented with acute on chronic hypoxemic & hypercapnic respiratory failure, likely i/s/o chronic OHS, OLAMIDE, pulmonary hypertension, COPD. Likely hypercapnic resp failure in setting of viral infection exacerbating COPD vs pulm HTN. Pulm consulted.     Patient seen and examined at bedside. Reports feeling well today. Denies dizziness, cough, chest pain, n/v. Lung exam CTAB, no wheezes. Cardiac exam RRR, no murmur.     #acute on chronic respiratory failure with hypercapnea: s/p 5 days of prednisone and now on prn nebs. Pulm following. c/w avaps at night. Will need avaps at home on dc. Echo shows EF 62%, grossly normal LV function. RV not well visualized. CTA chest no PE. RVP negative on admission. Repeat RVP negative.     #pulmHTN: holding home opsumit per pulm. holding aldactone while on IV lasix. Consider additional lasix if BP allows.    #HTN: holding home BP meds given low BP this morning. Will also need to make room for possible additional lasix. No dysuria. CXR shows vascular congestion. Pending blood cultures    #Elevated LFTs: now slowly improving. Continue to monitor    #OLAMIDE on home cpap: will need avaps as above    #Hx PE: c/w home eliquis    Discussed with Team 4.

## 2023-01-22 NOTE — PROGRESS NOTE ADULT - ASSESSMENT
57F PMH morbid obesity, HTN, pulm HTN, multiple PEs and LLE DVT on Eliquis, COPD on home oxygen 3L NC, OLAMIDE on nocturnal BiPAP who presented with acute on chronic hypoxemic & hypercapnic respiratory failure, likely i/s/o chronic OHS, OLAMIDE, pulmonary hypertension, COPD. Likely hypercapnic resp failure in setting of viral infection exacerbating COPD vs pulm HTN. Pulm consulted. S/p prednisone, duoneb ATC treatment. Pending AVAPs clearance for dc. C/b hypotension, given midodrine, responsive and stopped.

## 2023-01-22 NOTE — PROGRESS NOTE ADULT - PROBLEM SELECTOR PLAN 3
Episode of hypotension on 1/21 AM while feeling dizzy sitting upright, afebrile, HR in 90s, and oxygen requirements at pt's baseline   Limited orthostatics as pt lays flat is SOB and cannot lay flat for long  Has been receiving lasix 40 IV QD, only had output of about 450 net negative  Given overt hypervolemia, held off on further IVF for now, gave stat dose of midodrine 5 mg x1  - Compression stockings  - Losartan decreased from 100 to 25 mg QD and amlodipine held Episode of hypotension on 1/21 AM while feeling dizzy sitting upright, afebrile, HR in 90s, and oxygen requirements at pt's baseline. Limited orthostatics as pt lays flat is SOB and cannot lay flat for long but better with lying down today. Has been receiving lasix 40 IV QD, only had output of about 450 net negative 1/21.Given overt hypervolemia, held off on further IVF for now, gave stat dose of midodrine 5 mg x1    - CXR vascular congestion; UA neg; f/u BCx for infectious workup of hypotension  - fluid restriction to 1500mL given unable to inc lasix in setting of hypotension but volume overload physical exam  - Compression stockings  - Losartan decreased from 100 to 25 mg QD and amlodipine held

## 2023-01-22 NOTE — PROGRESS NOTE ADULT - SUBJECTIVE AND OBJECTIVE BOX
CHIEF COMPLAINT: [place]/ Bishop    Interval Events: Patient seen and examined at bedside. No acute events overnight.     REVIEW OF SYSTEMS:  Constitutional: [ X] negative [ ] fevers [ ] chills [ ] weight loss [ ] weight gain  HEENT: [ X] negative [ ] dry eyes [ ] eye irritation [ ] postnasal drip [ ] nasal congestion  CV: [ X] negative  [ ] chest pain [ ] orthopnea [ ] palpitations [ ] murmur  Resp: [X ] negative [ ] cough [ ] shortness of breath [ ] dyspnea [ ] wheezing [ ] sputum [ ] hemoptysis  GI: [ X] negative [ ] nausea [ ] vomiting [ ] diarrhea [ ] constipation [ ] abd pain [ ] dysphagia   : [ X] negative [ ] dysuria [ ] nocturia [ ] hematuria [ ] increased urinary frequency  Musculoskeletal: [ X] negative [ ] back pain [ ] myalgias [ ] arthralgias [ ] fracture  Skin: [ X] negative [ ] rash [ ] itch  Neurological: [ X] negative [ ] headache [ ] dizziness [ ] syncope [ ] weakness [ ] numbness  Psychiatric: [ X] negative [ ] anxiety [ ] depression  Endocrine: [ ] negative [ ] diabetes [ ] thyroid problem  Hematologic/Lymphatic: [ ] negative [ ] anemia [ ] bleeding problem  Allergic/Immunologic: [ ] negative [ ] itchy eyes [ ] nasal discharge [ ] hives [ ] angioedema  [ ] All other systems negative  [ ] Unable to assess ROS because ________    OBJECTIVE:  ICU Vital Signs Last 24 Hrs  T(C): 36.5 (2023 16:57), Max: 37 (2023 21:15)  T(F): 97.7 (2023 16:57), Max: 98.6 (2023 21:15)  HR: 95 (2023 16:57) (84 - 102)  BP: 125/80 (2023 16:57) (97/70 - 125/80)  BP(mean): --  ABP: --  ABP(mean): --  RR: 18 (2023 16:57) (18 - 20)  SpO2: 92% (2023 16:57) (90% - 95%)    O2 Parameters below as of 2023 16:57  Patient On (Oxygen Delivery Method): nasal cannula  O2 Flow (L/min): 4             @ 07:01  -   @ 18:28  --------------------------------------------------------  IN: 270 mL / OUT: 1050 mL / NET: -780 mL      CAPILLARY BLOOD GLUCOSE          PHYSICAL EXAM:  GEN: Age appropriate, resting comfortably in bed, no acute distress, non toxic appearing, speaking in complete sentences.   HEENT: Conjunctiva and sclera normal  PULM: Lungs CTAB, no wheezes, rales, rhonchi  CV: RRR, S1S2, no MRG  MSK: no stiffness or joint effusions  Abdominal: Soft, nontender to palpation, non-distended, +BS  Extremities: Significant lower extremity up to the knees  NEURO: AAOx3  Psych: normal affect, normal behavior  Skin: No rashes, lesions    HOSPITAL MEDICATIONS:  apixaban 5 milliGRAM(s) Oral every 12 hours      furosemide   Injectable 40 milliGRAM(s) IV Push daily  sildenafil (REVATIO) 20 milliGRAM(s) Oral three times a day      albuterol/ipratropium for Nebulization 3 milliLiter(s) Nebulizer every 6 hours PRN  montelukast 10 milliGRAM(s) Oral daily    melatonin 3 milliGRAM(s) Oral at bedtime PRN              fluticasone propionate 50 MICROgram(s)/spray Nasal Spray 1 Spray(s) Both Nostrils two times a day        LABS:                        11.3   6.72  )-----------( 211      ( 2023 05:05 )             40.3     Hgb Trend: 11.3<--, 11.7<--, 13.0<--, 11.9<--, 11.9<--      147<H>  |  93<L>  |  36<H>  ----------------------------<  84  3.5   |  50<HH>  |  0.97    Ca    9.2      2023 05:05  Phos  4.2       Mg     3.00         TPro  6.4  /  Alb  3.8  /  TBili  0.4  /  DBili  x   /  AST  17  /  ALT  23  /  AlkPhos  122<H>      Creatinine Trend: 0.97<--, 1.03<--, 0.88<--, 0.99<--, 0.62<--    Urinalysis Basic - ( 2023 18:35 )    Color: Yellow / Appearance: Clear / S.011 / pH: x  Gluc: x / Ketone: Negative  / Bili: Negative / Urobili: <2 mg/dL   Blood: x / Protein: Trace / Nitrite: Negative   Leuk Esterase: Moderate / RBC: 2-4 /HPF / WBC 8-12 /HPF   Sq Epi: x / Non Sq Epi: Occasional / Bacteria: Few        Venous Blood Gas:   @ 05:05  7.34/102/51/55/79.9  VBG Lactate: 1.8  Venous Blood Gas:   @ 05:50  7.34/104/61/56/88.0  VBG Lactate: 2.1      MICROBIOLOGY:     RADIOLOGY:  [ ] Reviewed and interpreted by me    PULMONARY FUNCTION TESTS:    EKG:

## 2023-01-23 LAB
ALBUMIN SERPL ELPH-MCNC: 3.7 G/DL — SIGNIFICANT CHANGE UP (ref 3.3–5)
ALP SERPL-CCNC: 118 U/L — SIGNIFICANT CHANGE UP (ref 40–120)
ALT FLD-CCNC: 21 U/L — SIGNIFICANT CHANGE UP (ref 4–33)
ANION GAP SERPL CALC-SCNC: 6 MMOL/L — LOW (ref 7–14)
AST SERPL-CCNC: 14 U/L — SIGNIFICANT CHANGE UP (ref 4–32)
BILIRUB SERPL-MCNC: 0.4 MG/DL — SIGNIFICANT CHANGE UP (ref 0.2–1.2)
BUN SERPL-MCNC: 25 MG/DL — HIGH (ref 7–23)
CALCIUM SERPL-MCNC: 9.2 MG/DL — SIGNIFICANT CHANGE UP (ref 8.4–10.5)
CHLORIDE SERPL-SCNC: 97 MMOL/L — LOW (ref 98–107)
CO2 SERPL-SCNC: 43 MMOL/L — HIGH (ref 22–31)
CREAT SERPL-MCNC: 0.84 MG/DL — SIGNIFICANT CHANGE UP (ref 0.5–1.3)
CULTURE RESULTS: SIGNIFICANT CHANGE UP
CULTURE RESULTS: SIGNIFICANT CHANGE UP
EGFR: 82 ML/MIN/1.73M2 — SIGNIFICANT CHANGE UP
GAS PNL BLDV: SIGNIFICANT CHANGE UP
GLUCOSE SERPL-MCNC: 95 MG/DL — SIGNIFICANT CHANGE UP (ref 70–99)
HCT VFR BLD CALC: 40 % — SIGNIFICANT CHANGE UP (ref 34.5–45)
HGB BLD-MCNC: 11.3 G/DL — LOW (ref 11.5–15.5)
MAGNESIUM SERPL-MCNC: 2.8 MG/DL — HIGH (ref 1.6–2.6)
MCHC RBC-ENTMCNC: 27.4 PG — SIGNIFICANT CHANGE UP (ref 27–34)
MCHC RBC-ENTMCNC: 28.3 GM/DL — LOW (ref 32–36)
MCV RBC AUTO: 96.9 FL — SIGNIFICANT CHANGE UP (ref 80–100)
NRBC # BLD: 0 /100 WBCS — SIGNIFICANT CHANGE UP (ref 0–0)
NRBC # FLD: 0 K/UL — SIGNIFICANT CHANGE UP (ref 0–0)
PHOSPHATE SERPL-MCNC: 3.4 MG/DL — SIGNIFICANT CHANGE UP (ref 2.5–4.5)
PLATELET # BLD AUTO: 204 K/UL — SIGNIFICANT CHANGE UP (ref 150–400)
POTASSIUM SERPL-MCNC: 3.9 MMOL/L — SIGNIFICANT CHANGE UP (ref 3.5–5.3)
POTASSIUM SERPL-SCNC: 3.9 MMOL/L — SIGNIFICANT CHANGE UP (ref 3.5–5.3)
PROT SERPL-MCNC: 6.5 G/DL — SIGNIFICANT CHANGE UP (ref 6–8.3)
RBC # BLD: 4.13 M/UL — SIGNIFICANT CHANGE UP (ref 3.8–5.2)
RBC # FLD: 14.2 % — SIGNIFICANT CHANGE UP (ref 10.3–14.5)
SODIUM SERPL-SCNC: 146 MMOL/L — HIGH (ref 135–145)
SPECIMEN SOURCE: SIGNIFICANT CHANGE UP
SPECIMEN SOURCE: SIGNIFICANT CHANGE UP
WBC # BLD: 7.12 K/UL — SIGNIFICANT CHANGE UP (ref 3.8–10.5)
WBC # FLD AUTO: 7.12 K/UL — SIGNIFICANT CHANGE UP (ref 3.8–10.5)

## 2023-01-23 PROCEDURE — 99233 SBSQ HOSP IP/OBS HIGH 50: CPT

## 2023-01-23 PROCEDURE — 99232 SBSQ HOSP IP/OBS MODERATE 35: CPT

## 2023-01-23 RX ADMIN — APIXABAN 5 MILLIGRAM(S): 2.5 TABLET, FILM COATED ORAL at 06:31

## 2023-01-23 RX ADMIN — Medication 3 MILLIGRAM(S): at 21:49

## 2023-01-23 RX ADMIN — Medication 40 MILLIGRAM(S): at 06:31

## 2023-01-23 RX ADMIN — Medication 1 SPRAY(S): at 17:35

## 2023-01-23 RX ADMIN — Medication 20 MILLIGRAM(S): at 21:46

## 2023-01-23 RX ADMIN — APIXABAN 5 MILLIGRAM(S): 2.5 TABLET, FILM COATED ORAL at 17:33

## 2023-01-23 RX ADMIN — Medication 20 MILLIGRAM(S): at 06:30

## 2023-01-23 RX ADMIN — Medication 1 SPRAY(S): at 06:32

## 2023-01-23 RX ADMIN — MONTELUKAST 10 MILLIGRAM(S): 4 TABLET, CHEWABLE ORAL at 12:52

## 2023-01-23 RX ADMIN — CARBAMIDE PEROXIDE 5 DROP(S): 81.86 SOLUTION/ DROPS AURICULAR (OTIC) at 06:31

## 2023-01-23 RX ADMIN — Medication 20 MILLIGRAM(S): at 14:17

## 2023-01-23 RX ADMIN — CARBAMIDE PEROXIDE 5 DROP(S): 81.86 SOLUTION/ DROPS AURICULAR (OTIC) at 17:35

## 2023-01-23 NOTE — PROGRESS NOTE ADULT - SUBJECTIVE AND OBJECTIVE BOX
CHIEF COMPLAINT:Patient is a 57y old  Female who presents with a chief complaint of Shortness of breath (23 Jan 2023 14:36)      Interval Events: No interval events. On BiPAP 10/5, tolerating. Does not tolerate AVAPS. Amenable to trial of higher BiPAP settings    REVIEW OF SYSTEMS:  [x] All other systems negative except per HPI   [ ] Unable to assess ROS because ________    OBJECTIVE:  ICU Vital Signs Last 24 Hrs  T(C): 37.7 (23 Jan 2023 17:02), Max: 37.7 (23 Jan 2023 17:02)  T(F): 99.8 (23 Jan 2023 17:02), Max: 99.8 (23 Jan 2023 17:02)  HR: 102 (23 Jan 2023 17:02) (85 - 102)  BP: 117/76 (23 Jan 2023 17:02) (109/72 - 126/85)  BP(mean): --  ABP: --  ABP(mean): --  RR: 19 (23 Jan 2023 17:02) (19 - 20)  SpO2: 94% (23 Jan 2023 17:02) (93% - 100%)    O2 Parameters below as of 23 Jan 2023 17:02  Patient On (Oxygen Delivery Method): nasal cannula  O2 Flow (L/min): 4            01-22 @ 07:01 - 01-23 @ 07:00  --------------------------------------------------------  IN: 420 mL / OUT: 1400 mL / NET: -980 mL    01-23 @ 07:01 - 01-23 @ 18:46  --------------------------------------------------------  IN: 570 mL / OUT: 850 mL / NET: -280 mL        PHYSICAL EXAM:  GENERAL: NAD  HEAD:  Atraumatic, Normocephalic  EYES: EOMI, conjunctiva and sclera clear  ENMT: Moist mucous membranes  NECK: Supple, No JVD, Normal thyroid  CHEST/LUNG: Clear to auscultation bilaterally; No rales, rhonchi, wheezing, or rubs  HEART: Regular rate and rhythm; No murmurs, rubs, or gallops  ABDOMEN: Soft, Nontender, Nondistended; Bowel sounds present  VASCULAR: +b/l LE edema  LYMPH: No lymphadenopathy noted  SKIN: No rashes or lesions  NERVOUS SYSTEM:  Alert & Oriented X3, grossly intact    HOSPITAL MEDICATIONS:  MEDICATIONS  (STANDING):  apixaban 5 milliGRAM(s) Oral every 12 hours  carbamide peroxide Otic Solution 5 Drop(s) Right Ear two times a day  fluticasone propionate 50 MICROgram(s)/spray Nasal Spray 1 Spray(s) Both Nostrils two times a day  furosemide   Injectable 40 milliGRAM(s) IV Push daily  montelukast 10 milliGRAM(s) Oral daily  sildenafil (REVATIO) 20 milliGRAM(s) Oral three times a day    MEDICATIONS  (PRN):  albuterol/ipratropium for Nebulization 3 milliLiter(s) Nebulizer every 6 hours PRN Shortness of Breath and/or Wheezing  melatonin 3 milliGRAM(s) Oral at bedtime PRN Insomnia      LABS:    The Labs were reviewed by me   The Radiology was reviewed by me    EKG tracing reviewed by me    01-23    146<H>  |  97<L>  |  25<H>  ----------------------------<  95  3.9   |  43<H>  |  0.84  01-22    147<H>  |  93<L>  |  36<H>  ----------------------------<  84  3.5   |  50<HH>  |  0.97  01-21    147<H>  |  93<L>  |  35<H>  ----------------------------<  97  3.6   |  46<HH>  |  1.03    Ca    9.2      23 Jan 2023 05:15  Ca    9.2      22 Jan 2023 05:05  Ca    9.2      21 Jan 2023 05:50  Phos  3.4     01-23  Mg     2.80     01-23    TPro  6.5  /  Alb  3.7  /  TBili  0.4  /  DBili  x   /  AST  14  /  ALT  21  /  AlkPhos  118  01-23  TPro  6.4  /  Alb  3.8  /  TBili  0.4  /  DBili  x   /  AST  17  /  ALT  23  /  AlkPhos  122<H>  01-22  TPro  6.9  /  Alb  4.0  /  TBili  0.4  /  DBili  x   /  AST  18  /  ALT  28  /  AlkPhos  141<H>  01-21    Magnesium, Serum: 2.80 mg/dL (01-23-23 @ 05:15)  Magnesium, Serum: 3.00 mg/dL (01-22-23 @ 05:05)  Magnesium, Serum: 2.70 mg/dL (01-21-23 @ 05:50)    Phosphorus Level, Serum: 3.4 mg/dL (01-23-23 @ 05:15)  Phosphorus Level, Serum: 4.2 mg/dL (01-22-23 @ 05:05)  Phosphorus Level, Serum: 4.6 mg/dL (01-21-23 @ 05:50)                                              11.3   7.12  )-----------( 204      ( 23 Jan 2023 05:15 )             40.0                         11.3   6.72  )-----------( 211      ( 22 Jan 2023 05:05 )             40.3                         11.7   6.62  )-----------( 244      ( 21 Jan 2023 05:50 )             40.9     CAPILLARY BLOOD GLUCOSE        Blood Gas Source Venous: Venous (01-23-23 @ 05:15)  Blood Gas Source Venous: Venous (01-21-23 @ 05:50)      MICROBIOLOGY:     RADIOLOGY:  [x] Reviewed and interpreted by me    Point of Care Ultrasound Findings:    PFT:    EKG:

## 2023-01-23 NOTE — PROGRESS NOTE ADULT - ATTENDING COMMENTS
57F PMH morbid obesity, HTN, pulm HTN, multiple PEs and LLE DVT on Eliquis, COPD on home oxygen 3L NC, OLAMIDE on nocturnal BiPAP who presented with acute on chronic hypoxemic & hypercapnic respiratory failure, likely i/s/o chronic OHS, OLAMIDE, pulmonary hypertension, COPD. Likely hypercapnic resp failure in setting of viral infection exacerbating COPD vs pulm HTN. Pulm consulted.     Patient seen and examined at bedside. Reports feeling well today.   Family at bedside - mother and niece. Mother offered information about the patient's overall lifestyle and health.   Denies dizziness, cough, chest pain, n/v. Lung exam CTAB, no wheezes. Cardiac exam RRR, no murmur.   Patient stated she was going to call for AVAPS to be arranged for home.  confirmed it was arranged.    #acute on chronic respiratory failure with hypercapnea: s/p 5 days of prednisone and now on prn nebs. Pulm following. c/w avaps at night. Will need avaps at home on dc. Echo shows EF 62%, grossly normal LV function. RV not well visualized. CTA chest no PE. RVP negative on admission. Repeat RVP negative.     #pulmHTN: holding home opsumit per pulm. holding aldactone while on IV lasix. Consider additional lasix if BP allows.    #HTN: holding home BP meds given low BP this morning. Will also need to make room for possible additional lasix. No dysuria. CXR shows vascular congestion. Pending blood cultures    #Elevated LFTs: now slowly improving. Continue to monitor    #OLAMIDE on home cpap: will need avaps as above    #Hx PE: c/w home eliquis    Dispo: AVAPS arranged for home. Likely discharge tomorrow to home if pulmonology agree.    Discussed with Team 4.

## 2023-01-23 NOTE — PROGRESS NOTE ADULT - ATTENDING COMMENTS
57F with class III obesity, pulmonary hypertension, history of PE on apixaban, COPD on home 3LPM, OLAMIDE/OHS CPAP pending delivery who presents with URI symptoms and near syncopal episode with acute on chronic hypoxemic respiratory failure in setting of acute diastolic heart failure, hypercapnia.   pt didn't tolerate AVAPS, "too strong pressure"; agreeable to increasing bilevel from 10/5 to 14/5, repeat VBG tomorrow. Rec. diamox given contraction alkalosis from ongoing diuresis. overall, pt feeling better from respiratory standpoint compared to previously.

## 2023-01-23 NOTE — PROGRESS NOTE ADULT - PROBLEM SELECTOR PLAN 3
Episode of hypotension on 1/21 AM while feeling dizzy sitting upright, afebrile, HR in 90s, and oxygen requirements at pt's baseline. Limited orthostatics as pt lays flat is SOB and cannot lay flat for long but better with lying down today. Has been receiving lasix 40 IV QD, only had output of about 450 net negative 1/21.Given overt hypervolemia, held off on further IVF for now, gave stat dose of midodrine 5 mg x1    - CXR vascular congestion; UA neg; f/u BCx for infectious workup of hypotension  - fluid restriction to 1500mL given unable to inc lasix in setting of hypotension but volume overload physical exam  - Compression stockings  - Losartan decreased from 100 to 25 mg QD and amlodipine held

## 2023-01-23 NOTE — PROGRESS NOTE ADULT - SUBJECTIVE AND OBJECTIVE BOX
PROGRESS NOTE:     Patient is a 57y old  Female who presents with a chief complaint of Shortness of breath (2023 18:28)      INTERVAL HISTORY: NAEO. VSS. ROS negative.    MEDICATIONS  (STANDING):  apixaban 5 milliGRAM(s) Oral every 12 hours  carbamide peroxide Otic Solution 5 Drop(s) Right Ear two times a day  fluticasone propionate 50 MICROgram(s)/spray Nasal Spray 1 Spray(s) Both Nostrils two times a day  furosemide   Injectable 40 milliGRAM(s) IV Push daily  montelukast 10 milliGRAM(s) Oral daily  sildenafil (REVATIO) 20 milliGRAM(s) Oral three times a day    MEDICATIONS  (PRN):  albuterol/ipratropium for Nebulization 3 milliLiter(s) Nebulizer every 6 hours PRN Shortness of Breath and/or Wheezing  melatonin 3 milliGRAM(s) Oral at bedtime PRN Insomnia      CAPILLARY BLOOD GLUCOSE        I&O's Summary    2023 07:01  -  2023 07:00  --------------------------------------------------------  IN: 420 mL / OUT: 1400 mL / NET: -980 mL        PHYSICAL EXAM:  Vital Signs Last 24 Hrs  T(C): 36.7 (2023 14:00), Max: 36.9 (2023 22:15)  T(F): 98.1 (2023 14:00), Max: 98.5 (2023 22:15)  HR: 93 (2023 14:00) (85 - 98)  BP: 126/85 (2023 14:00) (109/72 - 126/85)  BP(mean): --  RR: 19 (2023 14:00) (18 - 20)  SpO2: 97% (2023 14:00) (92% - 100%)    Parameters below as of 2023 14:00  Patient On (Oxygen Delivery Method): nasal cannula  O2 Flow (L/min): 4      CONSTITUTIONAL: NAD, well-developed  HEENT:   RESPIRATORY: Normal respiratory effort; lungs are clear to auscultation bilaterally  CARDIOVASCULAR: Regular rate and rhythm, normal S1 and S2, no murmur/rub/gallop; No lower extremity edema; Peripheral pulses are 2+ bilaterally  ABDOMEN: Nontender to palpation, normoactive bowel sounds, no rebound/guarding; No hepatosplenomegaly  MUSCULOSKELETAL: no clubbing or cyanosis of digits; no joint swelling or tenderness to palpation  PSYCH: A+O to person, place, and time; affect appropriate    LABS:                        11.3   7.12  )-----------( 204      ( 2023 05:15 )             40.0         146<H>  |  97<L>  |  25<H>  ----------------------------<  95  3.9   |  43<H>  |  0.84    Ca    9.2      2023 05:15  Phos  3.4       Mg     2.80         TPro  6.5  /  Alb  3.7  /  TBili  0.4  /  DBili  x   /  AST  14  /  ALT  21  /  AlkPhos  118            Urinalysis Basic - ( 2023 18:35 )    Color: Yellow / Appearance: Clear / S.011 / pH: x  Gluc: x / Ketone: Negative  / Bili: Negative / Urobili: <2 mg/dL   Blood: x / Protein: Trace / Nitrite: Negative   Leuk Esterase: Moderate / RBC: 2-4 /HPF / WBC 8-12 /HPF   Sq Epi: x / Non Sq Epi: Occasional / Bacteria: Few        Culture - Blood (collected 2023 05:15)  Source: .Blood Blood-Venous  Preliminary Report (2023 10:01):    No growth to date.    Culture - Blood (collected 2023 05:00)  Source: .Blood Blood-Peripheral  Preliminary Report (2023 10:01):    No growth to date.        RADIOLOGY:        ******************************  Authored By: Anabell Rowell MD PGY1  Internal Medicine  MS Teams  ******************************   PROGRESS NOTE:     Patient is a 57y old  Female who presents with a chief complaint of Shortness of breath (2023 18:28)      INTERVAL HISTORY: NAEO. VSS. ROS negative. Pt states that she tolerated the AVAPs at the ordered settings for maybe 2 hours before waking up to too high pressures, and requested [facility] RT to change it to lower settings 10/5, whcih she tolerated for the remaining 3 hours.    MEDICATIONS  (STANDING):  apixaban 5 milliGRAM(s) Oral every 12 hours  carbamide peroxide Otic Solution 5 Drop(s) Right Ear two times a day  fluticasone propionate 50 MICROgram(s)/spray Nasal Spray 1 Spray(s) Both Nostrils two times a day  furosemide   Injectable 40 milliGRAM(s) IV Push daily  montelukast 10 milliGRAM(s) Oral daily  sildenafil (REVATIO) 20 milliGRAM(s) Oral three times a day    MEDICATIONS  (PRN):  albuterol/ipratropium for Nebulization 3 milliLiter(s) Nebulizer every 6 hours PRN Shortness of Breath and/or Wheezing  melatonin 3 milliGRAM(s) Oral at bedtime PRN Insomnia          I&O's Summary    2023 07:01  -  2023 07:00  --------------------------------------------------------  IN: 420 mL / OUT: 1400 mL / NET: -980 mL        PHYSICAL EXAM:  Vital Signs Last 24 Hrs  T(C): 36.7 (2023 14:00), Max: 36.9 (2023 22:15)  T(F): 98.1 (2023 14:00), Max: 98.5 (2023 22:15)  HR: 93 (2023 14:00) (85 - 98)  BP: 126/85 (2023 14:00) (109/72 - 126/85)  BP(mean): --  RR: 19 (2023 14:00) (18 - 20)  SpO2: 97% (2023 14:00) (92% - 100%)    Parameters below as of 2023 14:00  Patient On (Oxygen Delivery Method): nasal cannula  O2 Flow (L/min): 4      CONSTITUTIONAL: NAD, well-developed  HEENT: on NC  RESPIRATORY: Normal respiratory effort; lungs are clear to auscultation bilaterally  CARDIOVASCULAR: Regular rate and rhythm, normal S1 and S2, no murmur/rub/gallop; 1+ lower extremity edema; Peripheral pulses are 2+ bilaterally  ABDOMEN: Nontender to palpation, normoactive bowel sounds, no rebound/guarding; No hepatosplenomegaly  MUSCULOSKELETAL: no clubbing or cyanosis of digits; no joint swelling or tenderness to palpation  PSYCH: A+O to person, place, and time; affect appropriate    LABS:                        11.3   7.12  )-----------( 204      ( 2023 05:15 )             40.0         146<H>  |  97<L>  |  25<H>  ----------------------------<  95  3.9   |  43<H>  |  0.84    Ca    9.2      2023 05:15  Phos  3.4       Mg     2.80         TPro  6.5  /  Alb  3.7  /  TBili  0.4  /  DBili  x   /  AST  14  /  ALT  21  /  AlkPhos  118            Urinalysis Basic - ( 2023 18:35 )    Color: Yellow / Appearance: Clear / S.011 / pH: x  Gluc: x / Ketone: Negative  / Bili: Negative / Urobili: <2 mg/dL   Blood: x / Protein: Trace / Nitrite: Negative   Leuk Esterase: Moderate / RBC: 2-4 /HPF / WBC 8-12 /HPF   Sq Epi: x / Non Sq Epi: Occasional / Bacteria: Few        Culture - Blood (collected 2023 05:15)  Source: .Blood Blood-Venous  Preliminary Report (2023 10:01):    No growth to date.    Culture - Blood (collected 2023 05:00)  Source: .Blood Blood-Peripheral  Preliminary Report (2023 10:01):    No growth to date.        ******************************  Authored By: Anabell Rowell MD PGY1  Internal Medicine  MS Teams  ******************************

## 2023-01-23 NOTE — PROGRESS NOTE ADULT - ASSESSMENT
57F PMH morbid obesity, HTN, pulm HTN, multiple PEs and LLE DVT on Eliquis, COPD on home oxygen 3L NC, OLAMIDE on nocturnal BiPAP who presented with acute on chronic hypoxemic & hypercapnic respiratory failure, likely i/s/o chronic OHS, OLAMIDE, pulmonary hypertension, COPD. Likely hypercapnic resp failure in setting of viral infection exacerbating COPD vs pulm HTN. Pulm consulted. S/p prednisone, duoneb ATC treatment. Pending AVAPs clearance for dc. C/b hypotension, given midodrine, responsive and stopped.  57F PMH morbid obesity, HTN, pulm HTN, multiple PEs and LLE DVT on Eliquis, COPD on home oxygen 3L NC, OLAMIDE on nocturnal BiPAP who presented with acute on chronic hypoxemic & hypercapnic respiratory failure, likely i/s/o chronic OHS, OLAMIDE, pulmonary hypertension, COPD. Likely hypercapnic resp failure in setting of viral infection exacerbating COPD vs pulm HTN. Pulm consulted. S/p prednisone, duoneb ATC treatment. Pending AVAPs clearance for dc. C/b hypotension, given midodrine, responsive and stopped. Now stable.

## 2023-01-23 NOTE — PROGRESS NOTE ADULT - PROBLEM SELECTOR PLAN 1
Patient w/ history of OLAMIDE/OHS, COPD, pulmonary hypertension who presented hypoxic to 60% upon EMS arrival in the field, VBG with pH 7.32 and pCO2 115 on admission, placed on BiPAP, ABG later on with pH 7.42 and pCO2 89.   CXR w/ clear lungs, POCUS w/ b/l B-lines. Likely i/s/o viral infection worsening pHTN vs COPD exacerbation    - Telemetry w/ continuous pulse ox; strict I/Os, daily weights  - S/p IV Lasix 40 mg x2 in ED. Continue IV Lasix 40 mg   - TTE unremarkable   - CTA chest with IV contrast: no PE, upper lobe GGO  - Continue BiPAP 16/8 PRN & qhs  - s/p prednisone 40mg x3d and duonebs now prn  - f/u pulm recs: AVAPS at night Patient w/ history of OLAMIDE/OHS, COPD, pulmonary hypertension who presented hypoxic to 60% upon EMS arrival in the field, VBG with pH 7.32 and pCO2 115 on admission, placed on BiPAP, ABG later on with pH 7.42 and pCO2 89.   CXR w/ clear lungs, POCUS w/ b/l B-lines. Likely i/s/o viral infection worsening pHTN vs COPD exacerbation    - Telemetry w/ continuous pulse ox; strict I/Os, daily weights  - S/p IV Lasix 40 mg x2 in ED. Continue IV Lasix 40 mg qd  - TTE unremarkable   - CTA chest with IV contrast: no PE, upper lobe GGO  - s/p prednisone 40mg x3d and duonebs now prn  - f/u pulm recs: AVAPS at night: , EPAP 7, IPAP 12-25

## 2023-01-23 NOTE — PROGRESS NOTE ADULT - ASSESSMENT
57F PMH of pulm HTN (follows with Dr Byrne, on sildenafil, recently started on macitentan 01/03/23), multiple PEs and LLE DVT (on apixaban), COPD on home oxygen 3L NC, OLAMIDE/OHS (on NIV), obesity class III, HTN presents with approx 1 week of lightheadedness and "not feeling herself" and  episodes of forgetfulness and an episode of near syncope at home AM 1/18 found to be in acute on chronic hypoxemic respiratory failure. Pulm consulted for further management.     #Acute on chronic respiratory failure in the setting of ADHF and pHTN:  - patient with worsening peripheral edema and shortness of breath, and acute on chronic hypercapneic respiratory failure  - currently being diuresed with improvement in peripheral edema  - pulmonary consulted for aid in management with patient's hypercapnea    Recommendations:  - would increase BiPAP to 14/5  - bicarb noted to be rising in the setting of diuresis, if worsening consider diamox 500mg IV  - c/w sildenafil and hold tomas Calle MS4   Fellow/attending addendum to follow

## 2023-01-24 ENCOUNTER — APPOINTMENT (OUTPATIENT)
Dept: PULMONOLOGY | Facility: CLINIC | Age: 58
End: 2023-01-24

## 2023-01-24 LAB
ALBUMIN SERPL ELPH-MCNC: 4.1 G/DL — SIGNIFICANT CHANGE UP (ref 3.3–5)
ALP SERPL-CCNC: 135 U/L — HIGH (ref 40–120)
ALT FLD-CCNC: 21 U/L — SIGNIFICANT CHANGE UP (ref 4–33)
ANION GAP SERPL CALC-SCNC: 14 MMOL/L — SIGNIFICANT CHANGE UP (ref 7–14)
AST SERPL-CCNC: 14 U/L — SIGNIFICANT CHANGE UP (ref 4–32)
BILIRUB SERPL-MCNC: 0.4 MG/DL — SIGNIFICANT CHANGE UP (ref 0.2–1.2)
BUN SERPL-MCNC: 17 MG/DL — SIGNIFICANT CHANGE UP (ref 7–23)
CALCIUM SERPL-MCNC: 9.6 MG/DL — SIGNIFICANT CHANGE UP (ref 8.4–10.5)
CHLORIDE SERPL-SCNC: 95 MMOL/L — LOW (ref 98–107)
CO2 SERPL-SCNC: 40 MMOL/L — HIGH (ref 22–31)
CREAT SERPL-MCNC: 0.81 MG/DL — SIGNIFICANT CHANGE UP (ref 0.5–1.3)
EGFR: 86 ML/MIN/1.73M2 — SIGNIFICANT CHANGE UP
GAS PNL BLDV: SIGNIFICANT CHANGE UP
GLUCOSE SERPL-MCNC: 100 MG/DL — HIGH (ref 70–99)
HCT VFR BLD CALC: 44.1 % — SIGNIFICANT CHANGE UP (ref 34.5–45)
HGB BLD-MCNC: 12.6 G/DL — SIGNIFICANT CHANGE UP (ref 11.5–15.5)
MAGNESIUM SERPL-MCNC: 2.8 MG/DL — HIGH (ref 1.6–2.6)
MCHC RBC-ENTMCNC: 27.6 PG — SIGNIFICANT CHANGE UP (ref 27–34)
MCHC RBC-ENTMCNC: 28.6 GM/DL — LOW (ref 32–36)
MCV RBC AUTO: 96.7 FL — SIGNIFICANT CHANGE UP (ref 80–100)
NRBC # BLD: 0 /100 WBCS — SIGNIFICANT CHANGE UP (ref 0–0)
NRBC # FLD: 0 K/UL — SIGNIFICANT CHANGE UP (ref 0–0)
PHOSPHATE SERPL-MCNC: 3.8 MG/DL — SIGNIFICANT CHANGE UP (ref 2.5–4.5)
PLATELET # BLD AUTO: 198 K/UL — SIGNIFICANT CHANGE UP (ref 150–400)
POTASSIUM SERPL-MCNC: 4.1 MMOL/L — SIGNIFICANT CHANGE UP (ref 3.5–5.3)
POTASSIUM SERPL-SCNC: 4.1 MMOL/L — SIGNIFICANT CHANGE UP (ref 3.5–5.3)
PROT SERPL-MCNC: 7.4 G/DL — SIGNIFICANT CHANGE UP (ref 6–8.3)
RBC # BLD: 4.56 M/UL — SIGNIFICANT CHANGE UP (ref 3.8–5.2)
RBC # FLD: 14 % — SIGNIFICANT CHANGE UP (ref 10.3–14.5)
SODIUM SERPL-SCNC: 149 MMOL/L — HIGH (ref 135–145)
WBC # BLD: 6.71 K/UL — SIGNIFICANT CHANGE UP (ref 3.8–10.5)
WBC # FLD AUTO: 6.71 K/UL — SIGNIFICANT CHANGE UP (ref 3.8–10.5)

## 2023-01-24 PROCEDURE — 99233 SBSQ HOSP IP/OBS HIGH 50: CPT

## 2023-01-24 RX ORDER — ACETAZOLAMIDE 250 MG/1
500 TABLET ORAL ONCE
Refills: 0 | Status: COMPLETED | OUTPATIENT
Start: 2023-01-24 | End: 2023-01-24

## 2023-01-24 RX ORDER — FUROSEMIDE 40 MG
40 TABLET ORAL DAILY
Refills: 0 | Status: DISCONTINUED | OUTPATIENT
Start: 2023-01-25 | End: 2023-01-25

## 2023-01-24 RX ADMIN — Medication 20 MILLIGRAM(S): at 05:21

## 2023-01-24 RX ADMIN — Medication 40 MILLIGRAM(S): at 05:21

## 2023-01-24 RX ADMIN — APIXABAN 5 MILLIGRAM(S): 2.5 TABLET, FILM COATED ORAL at 18:09

## 2023-01-24 RX ADMIN — ACETAZOLAMIDE 500 MILLIGRAM(S): 250 TABLET ORAL at 18:14

## 2023-01-24 RX ADMIN — Medication 20 MILLIGRAM(S): at 14:52

## 2023-01-24 RX ADMIN — Medication 1 SPRAY(S): at 05:21

## 2023-01-24 RX ADMIN — MONTELUKAST 10 MILLIGRAM(S): 4 TABLET, CHEWABLE ORAL at 11:48

## 2023-01-24 RX ADMIN — Medication 1 SPRAY(S): at 18:09

## 2023-01-24 RX ADMIN — Medication 20 MILLIGRAM(S): at 22:39

## 2023-01-24 RX ADMIN — APIXABAN 5 MILLIGRAM(S): 2.5 TABLET, FILM COATED ORAL at 05:21

## 2023-01-24 RX ADMIN — CARBAMIDE PEROXIDE 5 DROP(S): 81.86 SOLUTION/ DROPS AURICULAR (OTIC) at 18:08

## 2023-01-24 RX ADMIN — CARBAMIDE PEROXIDE 5 DROP(S): 81.86 SOLUTION/ DROPS AURICULAR (OTIC) at 05:20

## 2023-01-24 NOTE — PROGRESS NOTE ADULT - PROBLEM SELECTOR PLAN 6
, ALT 61, AST 32    - Trend CMP  - If liver enzymes remain elevated, can conduct further work up with CT or US imaging , ALT 61, AST 32    - Trend CMP  - If liver enzymes remain elevated, can conduct further work up with CT or US imaging. CTM

## 2023-01-24 NOTE — PROGRESS NOTE ADULT - PROBLEM SELECTOR PLAN 1
Patient w/ history of OLAMIDE/OHS, COPD, pulmonary hypertension who presented hypoxic to 60% upon EMS arrival in the field, VBG with pH 7.32 and pCO2 115 on admission, placed on BiPAP, ABG later on with pH 7.42 and pCO2 89.   CXR w/ clear lungs, POCUS w/ b/l B-lines. Likely i/s/o viral infection worsening pHTN vs COPD exacerbation    - Telemetry w/ continuous pulse ox; strict I/Os, daily weights -- pt informed to self-fluid restrict   - S/p IV Lasix 40 mg x2 in ED. Continue PO Lasix 40 mg qd. Trial of diamox x1 1/24  - TTE unremarkable   - CTA chest with IV contrast: no PE, upper lobe GGO  - s/p prednisone 40mg x3d and duonebs now prn  - f/u pulm recs:        - initial AVAPS rec at night: , EPAP 7, IPAP 12-25       - due to pt intolerance, ok with bipap  14/5 -- but pt continues to not be able to tolerate. If no further pulm recs, we will try 12/5. IF continued no tolerance, will rec inc duration of time on bipap

## 2023-01-24 NOTE — PROGRESS NOTE ADULT - PROBLEM SELECTOR PLAN 9
DVT ppx: Home Eliquis  Diet: DASH/TLC diet while off bipap  Dispo: Pending intervention DVT ppx: Home Eliquis  Diet: DASH/TLC diet   Dispo: Pending intervention

## 2023-01-24 NOTE — PROGRESS NOTE ADULT - SUBJECTIVE AND OBJECTIVE BOX
CHIEF COMPLAINT:Patient is a 57y old  Female who presents with a chief complaint of Shortness of breath (24 Jan 2023 11:11)      Interval Events:  Did not tolerate bipap 14/5, was decreased back to 10/5 overnight.    REVIEW OF SYSTEMS:  [x] All other systems negative except per HPI   [ ] Unable to assess ROS because ________    OBJECTIVE:  ICU Vital Signs Last 24 Hrs  T(C): 37 (24 Jan 2023 18:00), Max: 37.1 (23 Jan 2023 21:00)  T(F): 98.6 (24 Jan 2023 18:00), Max: 98.8 (23 Jan 2023 21:00)  HR: 102 (24 Jan 2023 18:00) (77 - 102)  BP: 111/69 (24 Jan 2023 18:00) (111/69 - 131/88)  BP(mean): --  ABP: --  ABP(mean): --  RR: 18 (24 Jan 2023 14:52) (18 - 21)  SpO2: 98% (24 Jan 2023 18:00) (94% - 100%)    O2 Parameters below as of 24 Jan 2023 14:52  Patient On (Oxygen Delivery Method): nasal cannula  O2 Flow (L/min): 6            01-23 @ 07:01 - 01-24 @ 07:00  --------------------------------------------------------  IN: 570 mL / OUT: 1750 mL / NET: -1180 mL    01-24 @ 07:01 - 01-24 @ 18:45  --------------------------------------------------------  IN: 580 mL / OUT: 2050 mL / NET: -1470 mL        PHYSICAL EXAM:  GENERAL: NAD  HEAD:  Atraumatic, Normocephalic  EYES: EOMI, conjunctiva and sclera clear  ENMT: Moist mucous membranes  NECK: Supple, No JVD, Normal thyroid  CHEST/LUNG: Clear to auscultation bilaterally; No rales, rhonchi, wheezing, or rubs  HEART: Regular rate and rhythm; No murmurs, rubs, or gallops  ABDOMEN: Soft, Nontender, Nondistended; Bowel sounds present  VASCULAR: +b/l LE edema  LYMPH: No lymphadenopathy noted  SKIN: No rashes or lesions  NERVOUS SYSTEM:  Alert & Oriented X3, grossly intact    HOSPITAL MEDICATIONS:  MEDICATIONS  (STANDING):  apixaban 5 milliGRAM(s) Oral every 12 hours  carbamide peroxide Otic Solution 5 Drop(s) Right Ear two times a day  fluticasone propionate 50 MICROgram(s)/spray Nasal Spray 1 Spray(s) Both Nostrils two times a day  montelukast 10 milliGRAM(s) Oral daily  sildenafil (REVATIO) 20 milliGRAM(s) Oral three times a day    MEDICATIONS  (PRN):  albuterol/ipratropium for Nebulization 3 milliLiter(s) Nebulizer every 6 hours PRN Shortness of Breath and/or Wheezing  melatonin 3 milliGRAM(s) Oral at bedtime PRN Insomnia      LABS:    The Labs were reviewed by me   The Radiology was reviewed by me    EKG tracing reviewed by me    01-24    149<H>  |  95<L>  |  17  ----------------------------<  100<H>  4.1   |  40<H>  |  0.81  01-23    146<H>  |  97<L>  |  25<H>  ----------------------------<  95  3.9   |  43<H>  |  0.84  01-22    147<H>  |  93<L>  |  36<H>  ----------------------------<  84  3.5   |  50<HH>  |  0.97    Ca    9.6      24 Jan 2023 06:33  Ca    9.2      23 Jan 2023 05:15  Ca    9.2      22 Jan 2023 05:05  Phos  3.8     01-24  Mg     2.80     01-24    TPro  7.4  /  Alb  4.1  /  TBili  0.4  /  DBili  x   /  AST  14  /  ALT  21  /  AlkPhos  135<H>  01-24  TPro  6.5  /  Alb  3.7  /  TBili  0.4  /  DBili  x   /  AST  14  /  ALT  21  /  AlkPhos  118  01-23  TPro  6.4  /  Alb  3.8  /  TBili  0.4  /  DBili  x   /  AST  17  /  ALT  23  /  AlkPhos  122<H>  01-22    Magnesium, Serum: 2.80 mg/dL (01-24-23 @ 06:33)  Magnesium, Serum: 2.80 mg/dL (01-23-23 @ 05:15)  Magnesium, Serum: 3.00 mg/dL (01-22-23 @ 05:05)    Phosphorus Level, Serum: 3.8 mg/dL (01-24-23 @ 06:33)  Phosphorus Level, Serum: 3.4 mg/dL (01-23-23 @ 05:15)  Phosphorus Level, Serum: 4.2 mg/dL (01-22-23 @ 05:05)                                              12.6   6.71  )-----------( 198      ( 24 Jan 2023 06:33 )             44.1                         11.3   7.12  )-----------( 204      ( 23 Jan 2023 05:15 )             40.0                         11.3   6.72  )-----------( 211      ( 22 Jan 2023 05:05 )             40.3     CAPILLARY BLOOD GLUCOSE        Blood Gas Source Venous: Venous (01-23-23 @ 05:15)      MICROBIOLOGY:     RADIOLOGY:  [ ] Reviewed and interpreted by me    Point of Care Ultrasound Findings:    PFT:    EKG:

## 2023-01-24 NOTE — PROGRESS NOTE ADULT - SUBJECTIVE AND OBJECTIVE BOX
PROGRESS NOTE:     Patient is a 57y old  Female who presents with a chief complaint of Shortness of breath (23 Jan 2023 18:45)      INTERVAL HISTORY: NAEO. VSS. ROS negative. Pt on bipap/avaps machine on exam, tolerating well. Settings was , IPAP 10, EPAP 5. Pt states that she was unable to tolerate 14/5 that pulmonology had recommended -- too much pressure still.     MEDICATIONS  (STANDING):  apixaban 5 milliGRAM(s) Oral every 12 hours  carbamide peroxide Otic Solution 5 Drop(s) Right Ear two times a day  fluticasone propionate 50 MICROgram(s)/spray Nasal Spray 1 Spray(s) Both Nostrils two times a day  montelukast 10 milliGRAM(s) Oral daily  sildenafil (REVATIO) 20 milliGRAM(s) Oral three times a day    MEDICATIONS  (PRN):  albuterol/ipratropium for Nebulization 3 milliLiter(s) Nebulizer every 6 hours PRN Shortness of Breath and/or Wheezing  melatonin 3 milliGRAM(s) Oral at bedtime PRN Insomnia      CAPILLARY BLOOD GLUCOSE        I&O's Summary    23 Jan 2023 07:01  -  24 Jan 2023 07:00  --------------------------------------------------------  IN: 570 mL / OUT: 1750 mL / NET: -1180 mL    24 Jan 2023 07:01  -  24 Jan 2023 11:11  --------------------------------------------------------  IN: 0 mL / OUT: 1350 mL / NET: -1350 mL        PHYSICAL EXAM:  Vital Signs Last 24 Hrs  T(C): 36.9 (24 Jan 2023 10:45), Max: 37.7 (23 Jan 2023 17:02)  T(F): 98.4 (24 Jan 2023 10:45), Max: 99.8 (23 Jan 2023 17:02)  HR: 88 (24 Jan 2023 10:45) (77 - 102)  BP: 115/81 (24 Jan 2023 10:45) (115/81 - 131/88)  BP(mean): --  RR: 18 (24 Jan 2023 10:45) (18 - 21)  SpO2: 100% (24 Jan 2023 10:45) (94% - 100%)    Parameters below as of 24 Jan 2023 10:45  Patient On (Oxygen Delivery Method): room air        CONSTITUTIONAL: NAD, well-developed  RESPIRATORY: Normal respiratory effort; lungs are clear to auscultation bilaterally  CARDIOVASCULAR: Regular rate and rhythm, normal S1 and S2, no murmur/rub/gallop; stable lower extremity edema; Peripheral pulses are 2+ bilaterally  ABDOMEN: Nontender to palpation, normoactive bowel sounds, no rebound/guarding; No hepatosplenomegaly  MUSCULOSKELETAL: no clubbing or cyanosis of digits; no joint swelling or tenderness to palpation  PSYCH: A+O to person, place, and time; affect appropriate    LABS:                        12.6   6.71  )-----------( 198      ( 24 Jan 2023 06:33 )             44.1     01-24    149<H>  |  95<L>  |  17  ----------------------------<  100<H>  4.1   |  40<H>  |  0.81    Ca    9.6      24 Jan 2023 06:33  Phos  3.8     01-24  Mg     2.80     01-24    TPro  7.4  /  Alb  4.1  /  TBili  0.4  /  DBili  x   /  AST  14  /  ALT  21  /  AlkPhos  135<H>  01-24              Culture - Blood (collected 22 Jan 2023 05:15)  Source: .Blood Blood-Venous  Preliminary Report (23 Jan 2023 10:01):    No growth to date.    Culture - Blood (collected 22 Jan 2023 05:00)  Source: .Blood Blood-Peripheral  Preliminary Report (23 Jan 2023 10:01):    No growth to date.      ******************************  Authored By: Anabell Rowell MD PGY1  Internal Medicine  MS Teams  ******************************

## 2023-01-24 NOTE — PROGRESS NOTE ADULT - PROBLEM SELECTOR PLAN 5
- Telemetry w/ continuous pulse ox  - Continue home sildenafil  - d/c home Opsumit per pulm  - Diuresis as per above  - TTE unremarkable   - Hold home aldactone for now while being diuresed with IV Lasix as above - Telemetry w/ continuous pulse ox  - Continue home sildenafil  - d/c home Opsumit per pulm  - Diuresis as per above  - TTE unremarkable   - Hold home aldactone for now while being diuresed as above

## 2023-01-24 NOTE — PROVIDER CONTACT NOTE (CRITICAL VALUE NOTIFICATION) - ASSESSMENT
Patient is A&Ox4. Patient denies pain, chest pain, shortness of breath, dizziness, nausea, and vomiting.
Pt is asymptomatic. VSS. 98.9F, 88 bpm, 123/79, 99% O2
AXO4  4L NC

## 2023-01-24 NOTE — PROGRESS NOTE ADULT - ASSESSMENT
57F PMH morbid obesity, HTN, pulm HTN, multiple PEs and LLE DVT on Eliquis, COPD on home oxygen 3L NC, OLAMIDE on nocturnal BiPAP who presented with acute on chronic hypoxemic & hypercapnic respiratory failure, likely i/s/o chronic OHS, OLAMIDE, pulmonary hypertension, COPD. Likely hypercapnic resp failure in setting of viral infection exacerbating COPD vs pulm HTN. Pulm consulted. S/p prednisone, duoneb ATC treatment, now duoneb prn. Pending AVAPs clearance for dc and pulm recs. Hospital course c/b hypotension, given midodrine x1, and since then has been stable.

## 2023-01-24 NOTE — PROVIDER CONTACT NOTE (CRITICAL VALUE NOTIFICATION) - SITUATION
bicarb 50
Critical Lab value received from lab- Bicarb of 53
Patient bicarb 58
Admitted for acute respiratory failure with hypoxia
CO2 46

## 2023-01-24 NOTE — PROGRESS NOTE ADULT - PROBLEM SELECTOR PLAN 7
on home cpap at night at home with poor compliance     - advised pt to bring home cpap machine nasal if she doesn't feel comfortable with hospital bipap/cpap masks  - now on AVAPS per pulm recs: , EPAP 7, IPAP 12-25 on home cpap at night at home with poor compliance     - advised pt to bring home cpap machine nasal if she doesn't feel comfortable with hospital bipap/cpap masks  - now on AVAPS per pulm recs: , EPAP 7, IPAP 12-25       - due to pt intolerance, ok with bipap  14/5 -- but pt continues to not be able to tolerate. If no further pulm recs, we will try 12/5. IF continued no tolerance, will rec inc duration of time on bipap

## 2023-01-24 NOTE — PROGRESS NOTE ADULT - ASSESSMENT
57F PMH of pulm HTN (follows with Dr Byrne, on sildenafil, recently started on macitentan 01/03/23), multiple PEs and LLE DVT (on apixaban), COPD on home oxygen 3L NC, OLAMIDE/OHS (on NIV), obesity class III, HTN presents with approx 1 week of lightheadedness and "not feeling herself" and  episodes of forgetfulness and an episode of near syncope at home AM 1/18 found to be in acute on chronic hypoxemic respiratory failure. Pulm consulted for further management.     #Acute on chronic respiratory failure in the setting of ADHF and pHTN:  - patient with worsening peripheral edema and shortness of breath, and acute on chronic hypercapneic respiratory failure  - currently being diuresed with improvement in peripheral edema  - pulmonary consulted for aid in management with patient's hypercapnea    Recommendations:  - would increase BiPAP to 12/5  - bicarb noted to be rising in the setting of diuresis, if worsening consider diamox 500mg IV  - c/w sildenafil and hold macicentan  - hypernatremia mgt per primary team

## 2023-01-24 NOTE — PROGRESS NOTE ADULT - ATTENDING COMMENTS
57F PMH morbid obesity, HTN, pulm HTN, multiple PEs and LLE DVT on Eliquis, COPD on home oxygen 3L NC, OLAMIDE on nocturnal BiPAP who presented with acute on chronic hypoxemic & hypercapnic respiratory failure, likely i/s/o chronic OHS, OLAMIDE, pulmonary hypertension, COPD. Likely hypercapnic resp failure in setting of viral infection exacerbating COPD vs pulm HTN. Pulm consulted.     Patient seen and examined at bedside. Reports feeling well today. Reports more or less back to baseline. States she was able to tolerate BIPAP last night. agreeable to increasing settings slightly tonight    #acute on chronic respiratory failure with hypercapnea: s/p 5 days of prednisone and now on prn nebs. Pulm following. trial increasing to Bipap 12/5.  Echo shows EF 62%, grossly normal LV function. RV not well visualized. CTA chest no PE. RVP negative on admission. Repeat RVP negative.     #pulmHTN: holding home opsumit per pulm. resume home dose of lasix. will plan to reintroduce aldactone as well in the next 24-48 hrs    #HTN: Continue to hold BP meds    #Elevated LFTs: resolved    #OLAMIDE on home cpap: will need bipap on discharge    #Hx PE: c/w home eliquis    Dispo: AVAPS arranged for home. Likely discharge tomorrow to home if pulmonology agree.    Discussed with Team 4.

## 2023-01-24 NOTE — PROVIDER CONTACT NOTE (CRITICAL VALUE NOTIFICATION) - RECOMMENDATIONS
provider aware
Contact provider
As per provider, Anabell Rowell (TEAMS), No further intervention needed at this time. RN will continue to monitor.
NASH Tapia aware.
provider aware

## 2023-01-25 LAB
ALBUMIN SERPL ELPH-MCNC: 4.1 G/DL — SIGNIFICANT CHANGE UP (ref 3.3–5)
ALP SERPL-CCNC: 136 U/L — HIGH (ref 40–120)
ALT FLD-CCNC: 21 U/L — SIGNIFICANT CHANGE UP (ref 4–33)
ANION GAP SERPL CALC-SCNC: 8 MMOL/L — SIGNIFICANT CHANGE UP (ref 7–14)
AST SERPL-CCNC: 14 U/L — SIGNIFICANT CHANGE UP (ref 4–32)
BILIRUB SERPL-MCNC: 0.5 MG/DL — SIGNIFICANT CHANGE UP (ref 0.2–1.2)
BUN SERPL-MCNC: 16 MG/DL — SIGNIFICANT CHANGE UP (ref 7–23)
CALCIUM SERPL-MCNC: 9.7 MG/DL — SIGNIFICANT CHANGE UP (ref 8.4–10.5)
CHLORIDE SERPL-SCNC: 96 MMOL/L — LOW (ref 98–107)
CO2 SERPL-SCNC: 39 MMOL/L — HIGH (ref 22–31)
CREAT SERPL-MCNC: 0.86 MG/DL — SIGNIFICANT CHANGE UP (ref 0.5–1.3)
EGFR: 80 ML/MIN/1.73M2 — SIGNIFICANT CHANGE UP
GAS PNL BLDV: SIGNIFICANT CHANGE UP
GLUCOSE SERPL-MCNC: 84 MG/DL — SIGNIFICANT CHANGE UP (ref 70–99)
HCT VFR BLD CALC: 46.5 % — HIGH (ref 34.5–45)
HGB BLD-MCNC: 13 G/DL — SIGNIFICANT CHANGE UP (ref 11.5–15.5)
MAGNESIUM SERPL-MCNC: 2.7 MG/DL — HIGH (ref 1.6–2.6)
MCHC RBC-ENTMCNC: 27.4 PG — SIGNIFICANT CHANGE UP (ref 27–34)
MCHC RBC-ENTMCNC: 28 GM/DL — LOW (ref 32–36)
MCV RBC AUTO: 97.9 FL — SIGNIFICANT CHANGE UP (ref 80–100)
NRBC # BLD: 0 /100 WBCS — SIGNIFICANT CHANGE UP (ref 0–0)
NRBC # FLD: 0 K/UL — SIGNIFICANT CHANGE UP (ref 0–0)
PHOSPHATE SERPL-MCNC: 3.5 MG/DL — SIGNIFICANT CHANGE UP (ref 2.5–4.5)
PLATELET # BLD AUTO: 201 K/UL — SIGNIFICANT CHANGE UP (ref 150–400)
POTASSIUM SERPL-MCNC: 3.4 MMOL/L — LOW (ref 3.5–5.3)
POTASSIUM SERPL-SCNC: 3.4 MMOL/L — LOW (ref 3.5–5.3)
PROT SERPL-MCNC: 7.3 G/DL — SIGNIFICANT CHANGE UP (ref 6–8.3)
RBC # BLD: 4.75 M/UL — SIGNIFICANT CHANGE UP (ref 3.8–5.2)
RBC # FLD: 13.9 % — SIGNIFICANT CHANGE UP (ref 10.3–14.5)
SODIUM SERPL-SCNC: 143 MMOL/L — SIGNIFICANT CHANGE UP (ref 135–145)
WBC # BLD: 8.04 K/UL — SIGNIFICANT CHANGE UP (ref 3.8–10.5)
WBC # FLD AUTO: 8.04 K/UL — SIGNIFICANT CHANGE UP (ref 3.8–10.5)

## 2023-01-25 PROCEDURE — 99232 SBSQ HOSP IP/OBS MODERATE 35: CPT

## 2023-01-25 RX ORDER — SPIRONOLACTONE 25 MG/1
25 TABLET, FILM COATED ORAL DAILY
Refills: 0 | Status: DISCONTINUED | OUTPATIENT
Start: 2023-01-26 | End: 2023-01-26

## 2023-01-25 RX ORDER — POTASSIUM CHLORIDE 20 MEQ
40 PACKET (EA) ORAL ONCE
Refills: 0 | Status: COMPLETED | OUTPATIENT
Start: 2023-01-25 | End: 2023-01-25

## 2023-01-25 RX ADMIN — Medication 1 SPRAY(S): at 18:23

## 2023-01-25 RX ADMIN — Medication 1 SPRAY(S): at 06:36

## 2023-01-25 RX ADMIN — CARBAMIDE PEROXIDE 5 DROP(S): 81.86 SOLUTION/ DROPS AURICULAR (OTIC) at 06:30

## 2023-01-25 RX ADMIN — APIXABAN 5 MILLIGRAM(S): 2.5 TABLET, FILM COATED ORAL at 18:23

## 2023-01-25 RX ADMIN — CARBAMIDE PEROXIDE 5 DROP(S): 81.86 SOLUTION/ DROPS AURICULAR (OTIC) at 18:25

## 2023-01-25 RX ADMIN — Medication 20 MILLIGRAM(S): at 22:57

## 2023-01-25 RX ADMIN — Medication 40 MILLIEQUIVALENT(S): at 15:09

## 2023-01-25 RX ADMIN — MONTELUKAST 10 MILLIGRAM(S): 4 TABLET, CHEWABLE ORAL at 15:09

## 2023-01-25 RX ADMIN — APIXABAN 5 MILLIGRAM(S): 2.5 TABLET, FILM COATED ORAL at 06:32

## 2023-01-25 RX ADMIN — Medication 40 MILLIGRAM(S): at 06:38

## 2023-01-25 RX ADMIN — Medication 20 MILLIGRAM(S): at 15:10

## 2023-01-25 RX ADMIN — Medication 20 MILLIGRAM(S): at 06:32

## 2023-01-25 NOTE — PROGRESS NOTE ADULT - ATTENDING COMMENTS
57F PMH morbid obesity, HTN, pulm HTN, multiple PEs and LLE DVT on Eliquis, COPD on home oxygen 3L NC, OLAMIDE on nocturnal BiPAP who presented with acute on chronic hypoxemic & hypercapnic respiratory failure, likely i/s/o chronic OHS, OLAMIDE, pulmonary hypertension, COPD. Likely hypercapnic resp failure in setting of viral infection exacerbating COPD vs pulm HTN. Pulm consulted.     Patient seen and examined at bedside. Reports feeling well today. Reports more or less back to baseline. States she was able to tolerate BIPAP last night. took it off once (was unaware) but replaced again    #acute on chronic respiratory failure with hypercapnea: s/p 5 days of prednisone and now on prn nebs. Pulm following. will discuss with pulm trialing higher settings today on BiPAP.  Echo shows EF 62%, grossly normal LV function. RV not well visualized. CTA chest no PE. RVP negative on admission. Repeat RVP negative.     #pulmHTN: holding home opsumit per pulm. resume home dose of lasix. will resume spironolactone today    #HTN: Continue to hold BP meds-may need less going forward    #Elevated LFTs: resolved    #OLAMIDE on home cpap: will need bipap on discharge    #Hx PE: c/w home eliquis    Dispo: AVAPS arranged for home. Likely discharge tomorrow to home if pulmonology agree.    Discussed with Team 4.

## 2023-01-25 NOTE — DIETITIAN INITIAL EVALUATION ADULT - OTHER INFO
Medical course: Per chart 57 year old female PMH morbid obesity, HTN, pulm HTN, multiple PEs and LLE DVT on Eliquis, COPD on home oxygen 3L NC, OLAMIDE on nocturnal BiPAP who presented with acute on chronic hypoxemic & hypercapnic respiratory failure. Likely hypercapnic resp failure in setting of viral infection exacerbating COPD vs pulm HTN. S/p prednisone, duoneb ATC treatment, now duoneb prn. Pending AVAPs clearance for dc and pulm recs. Hospital course c/b hypotension, given midodrine x1, and since then has been stable.     Nutrition interview: Pt A&Ox4, eating lunch during time of visit, 75% intake. No recent episodes of nausea, vomiting, diarrhea or constipation, last BM noted on 1/23 per RN flowsheets. Denies any chewing/swallowing difficulties. No food allergies. Stated UBW: 347#, states that weight often fluctuates (320-345# per [facility] HIE.) Intake is 50-75% per RN flowsheets and per pt. Feeding skills: independent. Hypernatremia resolved. Hypokalemia likely due to furosemide.

## 2023-01-25 NOTE — PROGRESS NOTE ADULT - PROBLEM SELECTOR PLAN 6
, ALT 61, AST 32    - Trend CMP  - If liver enzymes remain elevated, can conduct further work up with CT or US imaging. CTM

## 2023-01-25 NOTE — DIETITIAN INITIAL EVALUATION ADULT - PERTINENT MEDS FT
MEDICATIONS  (STANDING):  apixaban 5 milliGRAM(s) Oral every 12 hours  carbamide peroxide Otic Solution 5 Drop(s) Right Ear two times a day  fluticasone propionate 50 MICROgram(s)/spray Nasal Spray 1 Spray(s) Both Nostrils two times a day  montelukast 10 milliGRAM(s) Oral daily  potassium chloride    Tablet ER 40 milliEquivalent(s) Oral once  sildenafil (REVATIO) 20 milliGRAM(s) Oral three times a day    MEDICATIONS  (PRN):  albuterol/ipratropium for Nebulization 3 milliLiter(s) Nebulizer every 6 hours PRN Shortness of Breath and/or Wheezing  melatonin 3 milliGRAM(s) Oral at bedtime PRN Insomnia

## 2023-01-25 NOTE — DIETITIAN INITIAL EVALUATION ADULT - ORAL INTAKE PTA/DIET HISTORY
Pt lives at home with her daughter. Pt's mother, sister, and niece live downstairs. No difficulty obtaining groceries. Pt states that daughter works in nutrition field for department of education. Daughter has been making meals for pt to help her try and lose weight. Breakfast: cereal and decaf coffee. Lunch: small salad. Snack: fruit. Dinner: portion cups used for protein, starch and vegetable. One small ice cream for desert. Drinks water all day.  Pt States that her meals at home look similar to plates served in hospital. Multivitamin taken PTA.

## 2023-01-25 NOTE — DIETITIAN INITIAL EVALUATION ADULT - NSICDXPASTMEDICALHX_GEN_ALL_CORE_FT
0 = understands/communicates without difficulty
PAST MEDICAL HISTORY:  Acute DVT (deep venous thrombosis)     HTN (hypertension)     Morbid obesity     Pulmonary embolism

## 2023-01-25 NOTE — DIETITIAN INITIAL EVALUATION ADULT - LITERATURE/VIDEOS GIVEN
Reviewed weight management diet education with pt to help with weight loss, pt requesting handout. Provided pt with General Healthful Nutrition Therapy (2022) handout and reviewed myplate recommendations.

## 2023-01-25 NOTE — DIETITIAN INITIAL EVALUATION ADULT - ADD RECOMMEND
1) Recommend continue DASH/TLC diet   2) Obtain daily weights  3) Monitor weights, labs, BM's, skin integrity, p.o. intake.

## 2023-01-25 NOTE — PROGRESS NOTE ADULT - PROBLEM SELECTOR PLAN 1
Patient w/ history of OLAMIDE/OHS, COPD, pulmonary hypertension who presented hypoxic to 60% upon EMS arrival in the field, VBG with pH 7.32 and pCO2 115 on admission, placed on BiPAP, ABG later on with pH 7.42 and pCO2 89.   CXR w/ clear lungs, POCUS w/ b/l B-lines. Likely i/s/o viral infection worsening pHTN vs COPD exacerbation    - Telemetry w/ continuous pulse ox; strict I/Os, daily weights -- pt informed to self-fluid restrict   - S/p IV Lasix 40 mg x2 in ED. Continue PO Lasix 40 mg qd. Trial of diamox x1 1/24 - s/p improved bicarb on VBG  - TTE unremarkable   - CTA chest with IV contrast: no PE, upper lobe GGO  - s/p prednisone 40mg x3d and duonebs now prn  - f/u pulm recs:        - initial AVAPS rec at night: , EPAP 7, IPAP 12-25       - due to pt intolerance, ok with bipap 12/5

## 2023-01-25 NOTE — PROGRESS NOTE ADULT - PROBLEM SELECTOR PLAN 5
- Telemetry w/ continuous pulse ox  - Continue home sildenafil  - d/c home Opsumit per pulm  - Diuresis as per above  - TTE unremarkable   - Hold home aldactone for now while being diuresed as above

## 2023-01-25 NOTE — PROGRESS NOTE ADULT - PROBLEM SELECTOR PLAN 7
on home cpap at night at home with poor compliance     - advised pt to bring home cpap machine nasal if she doesn't feel comfortable with hospital bipap/cpap masks  - now on AVAPS per pulm recs: , EPAP 7, IPAP 12-25       - due to pt intolerance, ok with bipap  12/5

## 2023-01-25 NOTE — PROGRESS NOTE ADULT - SUBJECTIVE AND OBJECTIVE BOX
PROGRESS NOTE:     Patient is a 57y old  Female who presents with a chief complaint of Shortness of breath.    INTERVAL HISTORY: NAEO. VSS. ROS negative. On bipap 12/5 this am when I examined her this AM. Pt states unsure of when she had it on, but was awoken to be placed on it initially last night, and then removed it unconsciously last night and had to have it placed on her again.    MEDICATIONS  (STANDING):  apixaban 5 milliGRAM(s) Oral every 12 hours  carbamide peroxide Otic Solution 5 Drop(s) Right Ear two times a day  fluticasone propionate 50 MICROgram(s)/spray Nasal Spray 1 Spray(s) Both Nostrils two times a day  furosemide    Tablet 40 milliGRAM(s) Oral daily  montelukast 10 milliGRAM(s) Oral daily  potassium chloride    Tablet ER 40 milliEquivalent(s) Oral once  sildenafil (REVATIO) 20 milliGRAM(s) Oral three times a day    MEDICATIONS  (PRN):  albuterol/ipratropium for Nebulization 3 milliLiter(s) Nebulizer every 6 hours PRN Shortness of Breath and/or Wheezing  melatonin 3 milliGRAM(s) Oral at bedtime PRN Insomnia      CAPILLARY BLOOD GLUCOSE        I&O's Summary    24 Jan 2023 07:01  -  25 Jan 2023 07:00  --------------------------------------------------------  IN: 970 mL / OUT: 3600 mL / NET: -2630 mL        PHYSICAL EXAM:  Vital Signs Last 24 Hrs  T(C): 36.8 (25 Jan 2023 06:10), Max: 37 (24 Jan 2023 18:00)  T(F): 98.3 (25 Jan 2023 06:10), Max: 98.6 (24 Jan 2023 18:00)  HR: 93 (25 Jan 2023 08:12) (78 - 102)  BP: 123/86 (25 Jan 2023 06:10) (105/70 - 123/86)  BP(mean): --  RR: 18 (25 Jan 2023 06:10) (18 - 20)  SpO2: 93% (25 Jan 2023 08:12) (92% - 100%)    Parameters below as of 25 Jan 2023 06:10  Patient On (Oxygen Delivery Method): AVAPS        CONSTITUTIONAL: NAD, well-developed  HEENT: atraumatic   RESPIRATORY: Normal respiratory effort; lungs are clear to auscultation bilaterally  CARDIOVASCULAR: Regular rate and rhythm, normal S1 and S2, no murmur/rub/gallop; 1+ lower extremity edema; Peripheral pulses are 2+ bilaterally  ABDOMEN: Nontender to palpation, normoactive bowel sounds, no rebound/guarding; No hepatosplenomegaly  MUSCULOSKELETAL: no clubbing or cyanosis of digits; no joint swelling or tenderness to palpation  PSYCH: A+O to person, place, and time; affect appropriate    LABS:                        13.0   8.04  )-----------( 201      ( 25 Jan 2023 07:25 )             46.5     01-25    143  |  96<L>  |  16  ----------------------------<  84  3.4<L>   |  39<H>  |  0.86    Ca    9.7      25 Jan 2023 07:25  Phos  3.5     01-25  Mg     2.70     01-25    TPro  7.3  /  Alb  4.1  /  TBili  0.5  /  DBili  x   /  AST  14  /  ALT  21  /  AlkPhos  136<H>  01-25      ******************************  Authored By: Anabell Rowell MD PGY1  Internal Medicine  MS Teams  ******************************

## 2023-01-26 ENCOUNTER — TRANSCRIPTION ENCOUNTER (OUTPATIENT)
Age: 58
End: 2023-01-26

## 2023-01-26 VITALS
RESPIRATION RATE: 18 BRPM | DIASTOLIC BLOOD PRESSURE: 77 MMHG | SYSTOLIC BLOOD PRESSURE: 123 MMHG | HEART RATE: 91 BPM | TEMPERATURE: 98 F | OXYGEN SATURATION: 99 %

## 2023-01-26 LAB
ALBUMIN SERPL ELPH-MCNC: 3.5 G/DL — SIGNIFICANT CHANGE UP (ref 3.3–5)
ALP SERPL-CCNC: 108 U/L — SIGNIFICANT CHANGE UP (ref 40–120)
ALT FLD-CCNC: 16 U/L — SIGNIFICANT CHANGE UP (ref 4–33)
ANION GAP SERPL CALC-SCNC: 7 MMOL/L — SIGNIFICANT CHANGE UP (ref 7–14)
AST SERPL-CCNC: 13 U/L — SIGNIFICANT CHANGE UP (ref 4–32)
BILIRUB SERPL-MCNC: 0.4 MG/DL — SIGNIFICANT CHANGE UP (ref 0.2–1.2)
BUN SERPL-MCNC: 14 MG/DL — SIGNIFICANT CHANGE UP (ref 7–23)
CALCIUM SERPL-MCNC: 9.1 MG/DL — SIGNIFICANT CHANGE UP (ref 8.4–10.5)
CHLORIDE SERPL-SCNC: 100 MMOL/L — SIGNIFICANT CHANGE UP (ref 98–107)
CO2 SERPL-SCNC: 35 MMOL/L — HIGH (ref 22–31)
CREAT SERPL-MCNC: 0.88 MG/DL — SIGNIFICANT CHANGE UP (ref 0.5–1.3)
EGFR: 78 ML/MIN/1.73M2 — SIGNIFICANT CHANGE UP
GAS PNL BLDA: SIGNIFICANT CHANGE UP
GAS PNL BLDV: SIGNIFICANT CHANGE UP
GLUCOSE SERPL-MCNC: 90 MG/DL — SIGNIFICANT CHANGE UP (ref 70–99)
HCT VFR BLD CALC: 38.6 % — SIGNIFICANT CHANGE UP (ref 34.5–45)
HGB BLD-MCNC: 11.1 G/DL — LOW (ref 11.5–15.5)
MAGNESIUM SERPL-MCNC: 2.6 MG/DL — SIGNIFICANT CHANGE UP (ref 1.6–2.6)
MCHC RBC-ENTMCNC: 27.3 PG — SIGNIFICANT CHANGE UP (ref 27–34)
MCHC RBC-ENTMCNC: 28.8 GM/DL — LOW (ref 32–36)
MCV RBC AUTO: 94.8 FL — SIGNIFICANT CHANGE UP (ref 80–100)
NRBC # BLD: 0 /100 WBCS — SIGNIFICANT CHANGE UP (ref 0–0)
NRBC # FLD: 0 K/UL — SIGNIFICANT CHANGE UP (ref 0–0)
PHOSPHATE SERPL-MCNC: 3.4 MG/DL — SIGNIFICANT CHANGE UP (ref 2.5–4.5)
PLATELET # BLD AUTO: 191 K/UL — SIGNIFICANT CHANGE UP (ref 150–400)
POTASSIUM SERPL-MCNC: 4 MMOL/L — SIGNIFICANT CHANGE UP (ref 3.5–5.3)
POTASSIUM SERPL-SCNC: 4 MMOL/L — SIGNIFICANT CHANGE UP (ref 3.5–5.3)
PROT SERPL-MCNC: 6.1 G/DL — SIGNIFICANT CHANGE UP (ref 6–8.3)
RBC # BLD: 4.07 M/UL — SIGNIFICANT CHANGE UP (ref 3.8–5.2)
RBC # FLD: 13.8 % — SIGNIFICANT CHANGE UP (ref 10.3–14.5)
SODIUM SERPL-SCNC: 142 MMOL/L — SIGNIFICANT CHANGE UP (ref 135–145)
WBC # BLD: 7.83 K/UL — SIGNIFICANT CHANGE UP (ref 3.8–10.5)
WBC # FLD AUTO: 7.83 K/UL — SIGNIFICANT CHANGE UP (ref 3.8–10.5)

## 2023-01-26 PROCEDURE — 99233 SBSQ HOSP IP/OBS HIGH 50: CPT

## 2023-01-26 PROCEDURE — 99239 HOSP IP/OBS DSCHRG MGMT >30: CPT

## 2023-01-26 RX ORDER — MACITENTAN 10 MG/1
1 TABLET, FILM COATED ORAL
Qty: 0 | Refills: 0 | DISCHARGE

## 2023-01-26 RX ORDER — AMLODIPINE BESYLATE 2.5 MG/1
1 TABLET ORAL
Qty: 0 | Refills: 0 | DISCHARGE

## 2023-01-26 RX ORDER — FLUTICASONE PROPIONATE 50 MCG
1 SPRAY, SUSPENSION NASAL
Qty: 1 | Refills: 0
Start: 2023-01-26 | End: 2023-02-24

## 2023-01-26 RX ORDER — LOSARTAN POTASSIUM 100 MG/1
1 TABLET, FILM COATED ORAL
Qty: 0 | Refills: 0 | DISCHARGE

## 2023-01-26 RX ADMIN — Medication 1 SPRAY(S): at 06:10

## 2023-01-26 RX ADMIN — CARBAMIDE PEROXIDE 5 DROP(S): 81.86 SOLUTION/ DROPS AURICULAR (OTIC) at 06:12

## 2023-01-26 RX ADMIN — Medication 20 MILLIGRAM(S): at 14:13

## 2023-01-26 RX ADMIN — APIXABAN 5 MILLIGRAM(S): 2.5 TABLET, FILM COATED ORAL at 06:10

## 2023-01-26 RX ADMIN — MONTELUKAST 10 MILLIGRAM(S): 4 TABLET, CHEWABLE ORAL at 14:14

## 2023-01-26 RX ADMIN — SPIRONOLACTONE 25 MILLIGRAM(S): 25 TABLET, FILM COATED ORAL at 06:10

## 2023-01-26 RX ADMIN — Medication 20 MILLIGRAM(S): at 06:10

## 2023-01-26 NOTE — DISCHARGE NOTE NURSING/CASE MANAGEMENT/SOCIAL WORK - PATIENT PORTAL LINK FT
You can access the FollowMyHealth Patient Portal offered by [facility] by registering at the following website: [email or web address]. By joining [facility]’s FollowMyHealth portal, you will also be able to view your health information using other applications (apps) compatible with our system.

## 2023-01-26 NOTE — PROGRESS NOTE ADULT - ASSESSMENT
57F PMH morbid obesity, HTN, pulm HTN, multiple PEs and LLE DVT on Eliquis, COPD on home oxygen 3L NC, OLAMIDE on nocturnal BiPAP who presented with acute on chronic hypoxemic & hypercapnic respiratory failure, likely i/s/o chronic OHS, OLAMIDE, pulmonary hypertension, COPD. Likely hypercapnic resp failure in setting of viral infection exacerbating COPD vs pulm HTN. Pulm consulted. S/p prednisone, duoneb ATC treatment, now duoneb prn. Pending Bipap insurance clearance for dc and pulm recs. Hospital course c/b hypotension, given midodrine x1, and since then has been stable.

## 2023-01-26 NOTE — PROGRESS NOTE ADULT - PROBLEM SELECTOR PLAN 2
At admission, mild, with serum Na 147. Pt with hypervolemic hypernatremia.     - Diuresis as per above  - Monitor serum Na
At admission, mild, with serum Na 147. Pt with hypervolemic hypernatremia.     - Diuresis as per above  - Monitor serum Na
At admission, mild, with serum Na 147. Pt with hypervolemic hypernatremia.     - Diuresis as per above  - Monitor serum Na - resolved
At admission, mild, with serum Na 147. Pt with hypervolemic hypernatremia.     - Diuresis as per above  - Monitor serum Na

## 2023-01-26 NOTE — PROGRESS NOTE ADULT - ATTENDING COMMENTS
57F PMH morbid obesity, HTN, pulm HTN, multiple PEs and LLE DVT on Eliquis, COPD on home oxygen 3L NC, OLAMIDE on nocturnal BiPAP who presented with acute on chronic hypoxemic & hypercapnic respiratory failure, likely i/s/o chronic OHS, OLAMIDE, pulmonary hypertension, COPD. Likely hypercapnic resp failure in setting of viral infection exacerbating COPD vs pulm HTN. Pulm consulted.     Patient seen and examined at bedside. Reports feeling well today. currently on nasal bipap doing well. reports breathing is at baseline      #acute on chronic respiratory failure with hypercapnea: s/p 5 days of prednisone and now on prn nebs. Pulm following.  Tidal volumes seem more or less static in spite of titrating bipap settings. will discuss with pulm bipap vs avaps.  Echo shows EF 62%, grossly normal LV function. RV not well visualized. CTA chest no PE. RVP negative on admission. Repeat RVP negative.     #pulmHTN: holding home opsumit per pulm. c/w lasix and aldactone.     #HTN: monitor BPs and can consider resuming lower dose of losartan     #Elevated LFTs: resolved    #OLAMIDE on home cpap: will need bipap on discharge    #Hx PE: c/w home eliquis    Dispo: AVAPS arranged for home. discuss with pulm discharge criteria. hopefully discharge in 24-48 hrs    Discussed with Team 4.

## 2023-01-26 NOTE — PROGRESS NOTE ADULT - ATTENDING COMMENTS
Patient with OLAMIDE/OHS, hypercapnia.  Had been on 14/5 with nasal pillows, VBG this morning was 7.29/84.  I spoke to community surgical respiratory therapist who was setting up AVAPS at her home prior to discharge planning, patient set up with astral device tidal volume 400, pressure support 5-20, EPAP 5-10.  I asked RT to give chin tuck strap, nasal pillows for use.  Repeat ABG improved 7.4/61; patient may be discharged with pulmonary/sleep follow-up within 1 to 2 weeks.

## 2023-01-26 NOTE — PROGRESS NOTE ADULT - TIME BILLING
Medical management as above, review of results/records, discussion with patient and primary team, documentation.
patient care
Medical management as above, review of results/records, discussion with patient and primary team, documentation.
Time-based billing (NON-critical care).     35 minutes spent on total encounter; more than 50% of the visit was spent counseling and / or coordinating care by the attending physician.  The necessity of the time spent during the encounter on this date of service was due to:     review of laboratory data, radiology results, consultants' recommendations, documentation in [place], discussion with patient/ACP and interdisciplinary staff (such as , social workers, etc). Interventions were performed as documented above.
Time-based billing (NON-critical care).     35 minutes spent on total encounter; more than 50% of the visit was spent counseling and / or coordinating care by the attending physician.  The necessity of the time spent during the encounter on this date of service was due to:     review of laboratory data, radiology results, consultants' recommendations, documentation in [place], discussion with patient/ACP and interdisciplinary staff (such as , social workers, etc). Interventions were performed as documented above.
Time-based billing (NON-critical care).     35 minutes spent on total encounter; more than 50% of the visit was spent counseling and / or coordinating care by the attending physician.  The necessity of the time spent during the encounter on this date of service was due to:     review of laboratory data, radiology results, consultants' recommendations, documentation in [place], discussion with patient/ACP and interdisciplinary staff (such as , social workers, etc). Interventions were performed as documented above.
Time-based billing (NON-critical care).     35 minutes spent on total encounter; more than 50% of the visit was spent counseling and / or coordinating care by the attending physician.  The necessity of the time spent during the encounter on this date of service was due to:     review of laboratory data, radiology results, consultants' recommendations, documentation in [place], discussion with patient/ACP and interdisciplinary staff (such as , social workers, etc). Interventions were performed as documented above.

## 2023-01-26 NOTE — PROGRESS NOTE ADULT - ASSESSMENT
57F PMH of pulm HTN (follows with Dr Byrne, on sildenafil, recently started on macitentan 01/03/23), multiple PEs and LLE DVT (on apixaban), COPD on home oxygen 3L NC, OLAMIDE/OHS (on NIV), obesity class III, HTN presents with approx 1 week of lightheadedness and "not feeling herself" and  episodes of forgetfulness and an episode of near syncope at home AM 1/18 found to be in acute on chronic hypoxemic respiratory failure. Pulm consulted for further management.     #Acute on chronic respiratory failure in the setting of ADHF and pHTN:  - patient with worsening peripheral edema and shortness of breath, and acute on chronic hypercapneic respiratory failure  - currently being diuresed with improvement in peripheral edema  - pulmonary consulted for aid in management with patient's hypercapnea  - VBG yesterday 7.31/84 to 7.29/84 after increase bipap to 14/5.    Recommendations:  - would benefit most from AVAPS at this time, prior to undergoing sleep titration study, if she can tolerate, nasal pillows are suboptimal since pt sleeps with mouth open but can try chin strap as well to assist with this and she did not tolerate full face mask when she initially presented  - trial AVAPS TV: 400, PEEP: 5, P-High: 20, P-Low: 10, and recheck ABG  - c/w sildenafil and hold macicentan

## 2023-01-26 NOTE — PROGRESS NOTE ADULT - PROBLEM SELECTOR PLAN 9
CARDIOVASCULAR - ADULT    • Maintains optimal cardiac output and hemodynamic stability Progressing    • Absence of cardiac arrhythmias or at baseline Progressing          Assumed care of pt @ 2300. AOx2-3, forgetful, Hx of dementia.  Bed alarm in place for DVT ppx: Home Eliquis  Diet: DASH/TLC diet   Dispo: Pending bipap insurance acceptance

## 2023-01-26 NOTE — PROGRESS NOTE ADULT - SUBJECTIVE AND OBJECTIVE BOX
PROGRESS NOTE:     Patient is a 57y old  Female who presents with a chief complaint of Acute respiratory failure with hypoxia    INTERVAL HISTORY: NAEO. VSS. Pt asleep with bipap on overnight 14/5, tolerating well.     MEDICATIONS  (STANDING):  apixaban 5 milliGRAM(s) Oral every 12 hours  carbamide peroxide Otic Solution 5 Drop(s) Right Ear two times a day  fluticasone propionate 50 MICROgram(s)/spray Nasal Spray 1 Spray(s) Both Nostrils two times a day  montelukast 10 milliGRAM(s) Oral daily  sildenafil (REVATIO) 20 milliGRAM(s) Oral three times a day  spironolactone 25 milliGRAM(s) Oral daily    MEDICATIONS  (PRN):  albuterol/ipratropium for Nebulization 3 milliLiter(s) Nebulizer every 6 hours PRN Shortness of Breath and/or Wheezing  melatonin 3 milliGRAM(s) Oral at bedtime PRN Insomnia      CAPILLARY BLOOD GLUCOSE        I&O's Summary    25 Jan 2023 07:01  -  26 Jan 2023 07:00  --------------------------------------------------------  IN: 380 mL / OUT: 700 mL / NET: -320 mL        PHYSICAL EXAM:  Vital Signs Last 24 Hrs  T(C): 36.9 (26 Jan 2023 06:00), Max: 37.1 (25 Jan 2023 15:09)  T(F): 98.4 (26 Jan 2023 06:00), Max: 98.8 (25 Jan 2023 15:09)  HR: 78 (26 Jan 2023 06:55) (75 - 105)  BP: 120/65 (26 Jan 2023 06:00) (106/64 - 134/89)  BP(mean): --  RR: 17 (26 Jan 2023 06:00) (17 - 21)  SpO2: 95% (26 Jan 2023 06:55) (91% - 100%)    Parameters below as of 26 Jan 2023 06:00  Patient On (Oxygen Delivery Method): BiPAP/CPAP        CONSTITUTIONAL: NAD, well-developed  HEENT: bipap on  RESPIRATORY: Normal respiratory effort; lungs are clear to auscultation bilaterally  CARDIOVASCULAR: Regular rate and rhythm, normal S1 and S2, no murmur/rub/gallop; 1+ lower extremity edema; Peripheral pulses are 2+ bilaterally  ABDOMEN: Nontender to palpation, normoactive bowel sounds, no rebound/guarding; No hepatosplenomegaly  MUSCULOSKELETAL: no clubbing or cyanosis of digits; no joint swelling or tenderness to palpation  PSYCH: A+O to person, place, and time; affect appropriate    LABS:                        11.1   7.83  )-----------( 191      ( 26 Jan 2023 06:00 )             38.6     01-26    142  |  100  |  14  ----------------------------<  90  4.0   |  35<H>  |  0.88    Ca    9.1      26 Jan 2023 06:00  Phos  3.4     01-26  Mg     2.60     01-26    TPro  6.1  /  Alb  3.5  /  TBili  0.4  /  DBili  x   /  AST  13  /  ALT  16  /  AlkPhos  108  01-26      ******************************  Authored By: Anabell Rowell MD PGY1  Internal Medicine  MS Teams  ******************************

## 2023-01-26 NOTE — PROGRESS NOTE ADULT - NUTRITIONAL ASSESSMENT
This patient has been assessed with a concern for Malnutrition and has been determined to have a diagnosis/diagnoses of Morbid obesity (BMI > 40).    This patient is being managed with:   Diet DASH/TLC-  Sodium & Cholesterol Restricted  Entered: Jan 25 2023  7:30AM

## 2023-01-26 NOTE — DISCHARGE NOTE NURSING/CASE MANAGEMENT/SOCIAL WORK - NSDCDMETYPESERV_GEN_ALL_CORE_FT
Trilogy machine
PAST SURGICAL HISTORY:  H/O heart valve replacement with mechanical valve     S/P hip hemiarthroplasty on 7/16/2021 for right subcapital femoral neck fracture

## 2023-01-26 NOTE — PROGRESS NOTE ADULT - SUBJECTIVE AND OBJECTIVE BOX
CHIEF COMPLAINT:Patient is a 57y old  Female who presents with a chief complaint of Shortness of breath (26 Jan 2023 08:04)      Interval Events:    REVIEW OF SYSTEMS:  [x] All other systems negative except per HPI   [ ] Unable to assess ROS because ________    OBJECTIVE:  ICU Vital Signs Last 24 Hrs  T(C): 37 (26 Jan 2023 09:05), Max: 37.1 (25 Jan 2023 15:09)  T(F): 98.6 (26 Jan 2023 09:05), Max: 98.8 (25 Jan 2023 15:09)  HR: 98 (26 Jan 2023 12:13) (75 - 98)  BP: 133/58 (26 Jan 2023 09:05) (106/64 - 134/89)  BP(mean): --  ABP: --  ABP(mean): --  RR: 18 (26 Jan 2023 09:05) (17 - 21)  SpO2: 100% (26 Jan 2023 12:13) (94% - 100%)    O2 Parameters below as of 26 Jan 2023 09:05  Patient On (Oxygen Delivery Method): nasal cannula  O2 Flow (L/min): 3        Mode: NIV (Noninvasive Ventilation), RR (machine): 10, TV (machine): 400, FiO2: 50, PEEP: 5, P-High: 20, P-Low: 10, PIP: 18    01-25 @ 07:01  -  01-26 @ 07:00  --------------------------------------------------------  IN: 380 mL / OUT: 700 mL / NET: -320 mL        PHYSICAL EXAM:  GENERAL: NAD, well-groomed, well-developed  HEAD:  Atraumatic, Normocephalic  EYES: EOMI, PERRLA, conjunctiva and sclera clear  ENMT: No tonsillar erythema, exudates, or enlargement; Moist mucous membranes, Good dentition, No lesions  NECK: Supple, No JVD, Normal thyroid  CHEST/LUNG: Clear to auscultation bilaterally; No rales, rhonchi, wheezing, or rubs  HEART: Regular rate and rhythm; No murmurs, rubs, or gallops  ABDOMEN: Soft, Nontender, Nondistended; Bowel sounds present  VASCULAR:  2+ Peripheral Pulses, No clubbing, cyanosis, or edema  LYMPH: No lymphadenopathy noted  SKIN: No rashes or lesions  NERVOUS SYSTEM:  Alert & Oriented X3, Good concentration; Motor Strength 5/5 B/L upper and lower extremities; DTRs 2+ intact and symmetric    HOSPITAL MEDICATIONS:  MEDICATIONS  (STANDING):  apixaban 5 milliGRAM(s) Oral every 12 hours  carbamide peroxide Otic Solution 5 Drop(s) Right Ear two times a day  fluticasone propionate 50 MICROgram(s)/spray Nasal Spray 1 Spray(s) Both Nostrils two times a day  montelukast 10 milliGRAM(s) Oral daily  sildenafil (REVATIO) 20 milliGRAM(s) Oral three times a day  spironolactone 25 milliGRAM(s) Oral daily    MEDICATIONS  (PRN):  albuterol/ipratropium for Nebulization 3 milliLiter(s) Nebulizer every 6 hours PRN Shortness of Breath and/or Wheezing  melatonin 3 milliGRAM(s) Oral at bedtime PRN Insomnia      LABS:    The Labs were reviewed by me   The Radiology was reviewed by me    EKG tracing reviewed by me    01-26    142  |  100  |  14  ----------------------------<  90  4.0   |  35<H>  |  0.88  01-25    143  |  96<L>  |  16  ----------------------------<  84  3.4<L>   |  39<H>  |  0.86  01-24    149<H>  |  95<L>  |  17  ----------------------------<  100<H>  4.1   |  40<H>  |  0.81    Ca    9.1      26 Jan 2023 06:00  Ca    9.7      25 Jan 2023 07:25  Ca    9.6      24 Jan 2023 06:33  Phos  3.4     01-26  Mg     2.60     01-26    TPro  6.1  /  Alb  3.5  /  TBili  0.4  /  DBili  x   /  AST  13  /  ALT  16  /  AlkPhos  108  01-26  TPro  7.3  /  Alb  4.1  /  TBili  0.5  /  DBili  x   /  AST  14  /  ALT  21  /  AlkPhos  136<H>  01-25  TPro  7.4  /  Alb  4.1  /  TBili  0.4  /  DBili  x   /  AST  14  /  ALT  21  /  AlkPhos  135<H>  01-24    Magnesium, Serum: 2.60 mg/dL (01-26-23 @ 06:00)  Magnesium, Serum: 2.70 mg/dL (01-25-23 @ 07:25)  Magnesium, Serum: 2.80 mg/dL (01-24-23 @ 06:33)    Phosphorus Level, Serum: 3.4 mg/dL (01-26-23 @ 06:00)  Phosphorus Level, Serum: 3.5 mg/dL (01-25-23 @ 07:25)  Phosphorus Level, Serum: 3.8 mg/dL (01-24-23 @ 06:33)                                              11.1   7.83  )-----------( 191      ( 26 Jan 2023 06:00 )             38.6                         13.0   8.04  )-----------( 201      ( 25 Jan 2023 07:25 )             46.5                         12.6   6.71  )-----------( 198      ( 24 Jan 2023 06:33 )             44.1     CAPILLARY BLOOD GLUCOSE            MICROBIOLOGY:     RADIOLOGY:  [ ] Reviewed and interpreted by me    Point of Care Ultrasound Findings:    PFT:    EKG: CHIEF COMPLAINT:Patient is a 57y old  Female who presents with a chief complaint of Shortness of breath (26 Jan 2023 08:04)      Interval Events:  VBG yesterday 7.31/84 to 7.29/84 after increase bipap to 14/5.  Spoke with technician installing NIV at home over the phone with pt as well who reports they are setting up AVAPS     REVIEW OF SYSTEMS:  [x] All other systems negative except per HPI   [ ] Unable to assess ROS because ________    OBJECTIVE:  ICU Vital Signs Last 24 Hrs  T(C): 37 (26 Jan 2023 09:05), Max: 37.1 (25 Jan 2023 15:09)  T(F): 98.6 (26 Jan 2023 09:05), Max: 98.8 (25 Jan 2023 15:09)  HR: 98 (26 Jan 2023 12:13) (75 - 98)  BP: 133/58 (26 Jan 2023 09:05) (106/64 - 134/89)  BP(mean): --  ABP: --  ABP(mean): --  RR: 18 (26 Jan 2023 09:05) (17 - 21)  SpO2: 100% (26 Jan 2023 12:13) (94% - 100%)    O2 Parameters below as of 26 Jan 2023 09:05  Patient On (Oxygen Delivery Method): nasal cannula  O2 Flow (L/min): 3        Mode: NIV (Noninvasive Ventilation), RR (machine): 10, TV (machine): 400, FiO2: 50, PEEP: 5, P-High: 20, P-Low: 10, PIP: 18    01-25 @ 07:01  -  01-26 @ 07:00  --------------------------------------------------------  IN: 380 mL / OUT: 700 mL / NET: -320 mL        PHYSICAL EXAM:  GENERAL: NAD  HEAD:  Atraumatic, Normocephalic  EYES: EOMI, conjunctiva and sclera clear  ENMT: Moist mucous membranes  NECK: Supple, No JVD, Normal thyroid  CHEST/LUNG: Clear to auscultation bilaterally; No rales, rhonchi, wheezing, or rubs  HEART: Regular rate and rhythm; No murmurs, rubs, or gallops  ABDOMEN: Soft, Nontender, Nondistended; Bowel sounds present  VASCULAR: +b/l LE edema  LYMPH: No lymphadenopathy noted  SKIN: No rashes or lesions  NERVOUS SYSTEM:  Alert & Oriented X3, grossly intact    HOSPITAL MEDICATIONS:  MEDICATIONS  (STANDING):  apixaban 5 milliGRAM(s) Oral every 12 hours  carbamide peroxide Otic Solution 5 Drop(s) Right Ear two times a day  fluticasone propionate 50 MICROgram(s)/spray Nasal Spray 1 Spray(s) Both Nostrils two times a day  montelukast 10 milliGRAM(s) Oral daily  sildenafil (REVATIO) 20 milliGRAM(s) Oral three times a day  spironolactone 25 milliGRAM(s) Oral daily    MEDICATIONS  (PRN):  albuterol/ipratropium for Nebulization 3 milliLiter(s) Nebulizer every 6 hours PRN Shortness of Breath and/or Wheezing  melatonin 3 milliGRAM(s) Oral at bedtime PRN Insomnia      LABS:    The Labs were reviewed by me   The Radiology was reviewed by me    EKG tracing reviewed by me    01-26    142  |  100  |  14  ----------------------------<  90  4.0   |  35<H>  |  0.88  01-25    143  |  96<L>  |  16  ----------------------------<  84  3.4<L>   |  39<H>  |  0.86  01-24    149<H>  |  95<L>  |  17  ----------------------------<  100<H>  4.1   |  40<H>  |  0.81    Ca    9.1      26 Jan 2023 06:00  Ca    9.7      25 Jan 2023 07:25  Ca    9.6      24 Jan 2023 06:33  Phos  3.4     01-26  Mg     2.60     01-26    TPro  6.1  /  Alb  3.5  /  TBili  0.4  /  DBili  x   /  AST  13  /  ALT  16  /  AlkPhos  108  01-26  TPro  7.3  /  Alb  4.1  /  TBili  0.5  /  DBili  x   /  AST  14  /  ALT  21  /  AlkPhos  136<H>  01-25  TPro  7.4  /  Alb  4.1  /  TBili  0.4  /  DBili  x   /  AST  14  /  ALT  21  /  AlkPhos  135<H>  01-24    Magnesium, Serum: 2.60 mg/dL (01-26-23 @ 06:00)  Magnesium, Serum: 2.70 mg/dL (01-25-23 @ 07:25)  Magnesium, Serum: 2.80 mg/dL (01-24-23 @ 06:33)    Phosphorus Level, Serum: 3.4 mg/dL (01-26-23 @ 06:00)  Phosphorus Level, Serum: 3.5 mg/dL (01-25-23 @ 07:25)  Phosphorus Level, Serum: 3.8 mg/dL (01-24-23 @ 06:33)                                              11.1   7.83  )-----------( 191      ( 26 Jan 2023 06:00 )             38.6                         13.0   8.04  )-----------( 201      ( 25 Jan 2023 07:25 )             46.5                         12.6   6.71  )-----------( 198      ( 24 Jan 2023 06:33 )             44.1     CAPILLARY BLOOD GLUCOSE            MICROBIOLOGY:     RADIOLOGY:  [ ] Reviewed and interpreted by me    Point of Care Ultrasound Findings:    PFT:    EKG:

## 2023-01-26 NOTE — PROGRESS NOTE ADULT - PROBLEM SELECTOR PLAN 7
on home cpap at night at home with poor compliance     - advised pt to bring home cpap machine nasal if she doesn't feel comfortable with hospital bipap/cpap masks  - now on AVAPS per pulm recs: , EPAP 7, IPAP 12-25       - due to pt intolerance, ok with bipap  14/5

## 2023-01-26 NOTE — PROGRESS NOTE ADULT - PROVIDER SPECIALTY LIST ADULT
Pulmonology
Internal Medicine
Pulmonology
Pulmonology
Internal Medicine

## 2023-01-26 NOTE — PROGRESS NOTE ADULT - PROBLEM SELECTOR PLAN 1
Patient w/ history of OLAMIDE/OHS, COPD, pulmonary hypertension who presented hypoxic to 60% upon EMS arrival in the field, VBG with pH 7.32 and pCO2 115 on admission, placed on BiPAP, ABG later on with pH 7.42 and pCO2 89.   CXR w/ clear lungs, POCUS w/ b/l B-lines. Likely i/s/o viral infection worsening pHTN vs COPD exacerbation    - Telemetry w/ continuous pulse ox; strict I/Os, daily weights -- pt informed to self-fluid restrict   - S/p IV Lasix 40 mg x2 in ED. Continue PO Lasix 40 mg qd. Trial of diamox x1 1/24 - s/p improved bicarb on VBG  - TTE unremarkable   - CTA chest with IV contrast: no PE, upper lobe GGO  - s/p prednisone 40mg x3d and duonebs now prn  - f/u pulm recs:        - initial AVAPS rec at night: , EPAP 7, IPAP 12-25       - due to pt intolerance, ok with bipap 14/5 - tolerated overnight

## 2023-01-26 NOTE — DISCHARGE NOTE NURSING/CASE MANAGEMENT/SOCIAL WORK - NSDCPEFALRISK_GEN_ALL_CORE
For information on Fall & Injury Prevention, visit: [email or web address] OR  [email or web address] OR  https://www.cdc.gov/steadi/patient.html

## 2023-01-27 LAB
CULTURE RESULTS: SIGNIFICANT CHANGE UP
CULTURE RESULTS: SIGNIFICANT CHANGE UP
SPECIMEN SOURCE: SIGNIFICANT CHANGE UP
SPECIMEN SOURCE: SIGNIFICANT CHANGE UP

## 2023-01-30 ENCOUNTER — NON-APPOINTMENT (OUTPATIENT)
Age: 58
End: 2023-01-30

## 2023-01-30 ENCOUNTER — APPOINTMENT (OUTPATIENT)
Dept: CARDIOLOGY | Facility: CLINIC | Age: 58
End: 2023-01-30
Payer: COMMERCIAL

## 2023-01-30 VITALS
SYSTOLIC BLOOD PRESSURE: 157 MMHG | WEIGHT: 293 LBS | HEART RATE: 94 BPM | DIASTOLIC BLOOD PRESSURE: 81 MMHG | OXYGEN SATURATION: 99 % | BODY MASS INDEX: 63.28 KG/M2

## 2023-01-30 PROCEDURE — 99214 OFFICE O/P EST MOD 30 MIN: CPT | Mod: 25

## 2023-01-30 PROCEDURE — 93000 ELECTROCARDIOGRAM COMPLETE: CPT

## 2023-01-31 NOTE — REASON FOR VISIT
[CV Risk Factors and Non-Cardiac Disease] : CV risk factors and non-cardiac disease [Other: ____] : [unfilled] [Other: _____] : [unfilled] [FreeTextEntry1] : January 2023 - Patient returns today for follow-up after a recent hospitalization for hypoxic respiratory failure, likely due to infection. She was started on AVAPS overnight. \par She wad discharged off her amlodipine and her losartan.

## 2023-01-31 NOTE — CARDIOLOGY SUMMARY
[de-identified] : 10/22/2021, sinus tachycardia with poor R-wave progression [de-identified] : 7/19/2021 RHC with Beau Shepherd MD at [facility], PCWP 13 mmHg, RA 10 mmHg, RV 56/11 mmHg, Ao 141/77 mmHg, CO 7.8 L/min by Ranjan

## 2023-01-31 NOTE — DISCUSSION/SUMMARY
[EKG obtained to assist in diagnosis and management of assessed problem(s)] : EKG obtained to assist in diagnosis and management of assessed problem(s) [FreeTextEntry1] : Patient is a 57 year-old woman with pulmonary hypertension, likely multifactorial, managed by Rc Byrne MD.\par Supplemental oxygen x24 hours per day. AVAPS QHS.\par \par Recent hospitalization for hypoxia and noted to have ground glass opacities, likely infectious in etiology (viral). She was treated with steroids and discharged off of them. \par Recommend restarting amlodipine and losartan at this time. \par \par Continue diet and exercise with the goal of ongoing weight loss.\par \par There is no cardiac contraindication to proceeding with rehab.

## 2023-01-31 NOTE — HISTORY OF PRESENT ILLNESS
[FreeTextEntry1] : Patient is a 57 year-old Black woman with known past medical history of hypertension, PE on anticoagulation, pulmonary hypertension, OLAMIDE, and morbid obesity.\par Admitted to [facility] for shortness of breath in July 2021. She had a right heart catheterization showing minimally elevated pulmonary capillary wedge pressure with elevated RVSP and RA pressures. She is now maintained on 3 L/min via nasal cannula 24 hours a day. She has been started on furosemide 40 mg BID and spironolactone that is ordered for 25 mg three days per week. \par Her daughter is a nutritionist and has been preparing her meals. She has lost almost 30 pounds in the past month. \par \par She has been trying to exercise more recently, including walking for 10 minutes at a time.

## 2023-02-03 ENCOUNTER — RX RENEWAL (OUTPATIENT)
Age: 58
End: 2023-02-03

## 2023-02-13 ENCOUNTER — NON-APPOINTMENT (OUTPATIENT)
Age: 58
End: 2023-02-13

## 2023-02-13 ENCOUNTER — RX RENEWAL (OUTPATIENT)
Age: 58
End: 2023-02-13

## 2023-02-13 ENCOUNTER — APPOINTMENT (OUTPATIENT)
Dept: PULMONOLOGY | Facility: CLINIC | Age: 58
End: 2023-02-13
Payer: COMMERCIAL

## 2023-02-13 VITALS
TEMPERATURE: 97 F | BODY MASS INDEX: 53.92 KG/M2 | OXYGEN SATURATION: 94 % | DIASTOLIC BLOOD PRESSURE: 77 MMHG | SYSTOLIC BLOOD PRESSURE: 118 MMHG | HEART RATE: 117 BPM | WEIGHT: 293 LBS | RESPIRATION RATE: 15 BRPM | HEIGHT: 62 IN

## 2023-02-13 DIAGNOSIS — G47.33 OBSTRUCTIVE SLEEP APNEA (ADULT) (PEDIATRIC): ICD-10-CM

## 2023-02-13 PROCEDURE — 82803 BLOOD GASES ANY COMBINATION: CPT

## 2023-02-13 PROCEDURE — 99215 OFFICE O/P EST HI 40 MIN: CPT | Mod: 25

## 2023-02-13 PROCEDURE — 36600 WITHDRAWAL OF ARTERIAL BLOOD: CPT | Mod: 59

## 2023-02-13 PROCEDURE — 36415 COLL VENOUS BLD VENIPUNCTURE: CPT

## 2023-02-13 RX ORDER — MACITENTAN 10 MG/1
10 TABLET, FILM COATED ORAL
Qty: 30 | Refills: 3 | Status: DISCONTINUED | COMMUNITY
Start: 2022-11-29 | End: 2023-02-13

## 2023-02-13 NOTE — HISTORY OF PRESENT ILLNESS
[Never] : never [TextBox_4] : Pt here for follow up pHTN/  PE and OLAMIDE. Pending CPAP delivery- some delay ^ recent recall. Continues on lasix for pHTN, supplemental O2 with sats at 88% on RA at rest. \par \par PT REPORTS  H/O PE IN 1995, 2010 AND 2012  ----VQ scan 7/2021 low prob for pE--- has pulmonary hypertension\par 6MWT 2022--------ON 3L/ METERS\par CTA chest 7/2021\par IMPRESSION:\par No main, right, left, or lobar pulmonary embolism. Limited evaluation of segmental and subsegmental pulmonary arteries.\par No pneumonia.\par \par VQ scan 7/2021 low prob for pE\par ----VQ scan 2022-- low prob for pE-\par \par PFT 8/2021 moderate to severe restrictive lung disease\par \par HST w/TERESA 16.9- still has not received cpap\par \par \par : CT ANGIO CHEST PULM [facility] ORDERED BY: ERASMO GUZMAN\par PROCEDURE DATE: 01/19/2023\par INTERPRETATION: Reason for Exam: Shortness of breath. chest pain.\par CTA of the chest was performed from the thoracic inlet to the level of the adrenal glands following IV contrast injection of 80 cc of Omnipaque 350. No immediate complications were reported. MIP images were also created and reviewed.\par Mediastinum and Heart: Aorta and pulmonary arteries are normal in size. Thyroid gland is unremarkable. No lymphadenopathy. No pericardial effusion.\par Lungs, Pleura, and Airways: There is no pulmonary embolus. Patchy groundglass opacities noted within the upper lobes along with minimal patchy consolidation in the lower lobes peripherally, new since the previous exam.\par IMPRESSION:\par No pulmonary embolus.\par Patchy groundglass opacities noted within the upper lobes along with minimal patchy consolidation in the lower lobes peripherally, new since the previous exam. Entities such as atypical infection including COVID 19 should be considered.\par \par \par echo 7/2021 \par CONCLUSIONS:\par Technically difficult study.\par 1. Normal mitral valve. Minimal mitral regurgitation.\par 2. Normal left ventricular internal dimensions and wall\par thicknesses.\par 3. Endocardium not well visualized; grossly normal left\par ventricular systolic function.  Endocardial visualization\par \par enhanced with intravenous injection of echo contrast\par (Definity).\par 4. Unable to accurately evaluate right ventricular size or\par systolic function.\par \par echo 1/2023\par CONCLUSIONS:\par Technically difficult study.\par 1. Normal mitral valve. Minimal mitral regurgitation.\par 2. Normal left ventricular internal dimensions and wall\par thicknesses.\par 3. Endocardium not well visualized; grossly normal left\par ventricular systolic function.  Endocardial visualization\par enhanced with intravenous injection of echo contrast\par (Definity).\par 4. The right ventricle is not well visualized.\par 5. Inadequate tricuspid regurgitation Doppler envelope\par precludes estimation of RVSP.\par \par cardiac cath 7/2021 \par mean PA 30\par Aortic Pressure (S/D/M): 141/77/100\par Pressures:  -- Aortic Pressure (S/D/M): 53/20/34\par Pressures:  -- Pulmonary Capillary Wedge: 17/15/13\par Pressures:  -- Right Atrium (a/v/M): 16/11/10\par Pressures:  -- Right Ventricle (s/edp): 56/11/--\par CO by Ranjan: 7.79\par Pulmonary vascular resistance (Wood Units): 2.69\par Total pulmonary resistance (Wood Units): 4.36\par \par \par 1/6/2022 pulm htn Group I started on sildenafil 20mg tid and diuretics, on continuous oxygen\par On eliquis since 7/2021 for IN\par She is c/o exertional dyspnea\par Has OLAMIDE - not yet received cpap\par \par 2/22/2022 pulm htn group1 on 20 mg TID sildenafil,  diuretics, continuously oxygen 3 LMP\par on eliquis since 7/2021 \par she is still experiencing exertional dyspnea \par AWAITING  CPAP ---- cpap okay\par \par march 2022--------pulm htn group1 on 20 mg TID sildenafil,  diuretics, continuously oxygen 3 LMP\par on eliquis since 7/2021 \par she is still experiencing exertional dyspnea ---enouraged to partcipate i pulmonary rehab--WT LOSS ADVISED \par AWAITING  CPAP ---- cpap okay-----AGREE WITH HER REQUEST FRO DISABILITY\par \par 4/2022 pulm htn treated w/sildenafil 20mg tid- stable from resp perspective, on continuous oxygen and diuretics--- on Lasix will add SPIROLACTONE------  PHTN CONTD   sildenafil\par OSA_ unable to received cpap d/t coverage issue\par \par 5/2022 Feeling better than last visit with Lasix and Aldactone. Compliant with medications including Sildenafil 20mg TID, Lasix 40mg PO daily, and Aldactone 25mg TIW. Denies any fevers, chills. Currently on 3L nc O2. Endorses ET of at least 300 feet. Still unable to get her CPAP due to insurance issues. \par \par \par \par 7/2022 TTM with pt- unable to come in d/t transportation issues\par Pulm htn- on sildenafil 20mg tid, lasix 40mg bid, and spironolactone 25mg 3x weekly- having pedal edema. uses oxygen 3lpm x 24hrs, o2 sat drops to 88% room air rest.\par s/p cardiology appt with Dr Guillory- has f/u again in Sept\par OLAMIDE- not on cpap d/t lack of DME coverage- anticipates new coverage 12/2022\par \par 9/2022--ON SILDENAFIL ----LASIX AND ALDACTONE --- Patient doing well, continued to take aldactone three times a week. Eating meals from her daughter who is a nutritionist, attempting to lose weight. States that her LE edema is improved. Continues to use oxygen. Completed 6MWT today.\par \par NOV 2022------ON SILDENAFIL ----LASIX AND ALDACTONE --- Patient doing well, continued to take aldactone three times a week.  On home oxygen 2 to 3 L/min---------. States that her LE edema is improved. Continues to use oxygen.------ awaiting CPAP study for her sleep apnea----------- needs a repeat echo\par \par \par 2/2023--- patient admitted to [facility] in January 2, 2002 3 for shortness of worsening shortness of breath-------- she is on on sildenafil, recently started on--macitentan 01/03/23)[ DID NOT TOLERATE MACETANTAN HENCE WAS STOPPED ]-----H/O , multiple PEs and LLE DVT (on apixaban), COPD on home oxygen 3L NC, OLAMIDE/OHS (on NIV), obesity class III, HTN -----\par management.\par Was treated in the hospital for hypercapnic respiratory failure is on AVAPS at home\par Acute on chronic respiratory failure in the setting of ADHF and pHTN:\par \par   [TextBox_100] : 9/2021 [TextBox_108] : 16.9 [TextBox_112] : 13.5

## 2023-02-13 NOTE — REASON FOR VISIT
[Pulmonary Embolism] : pulmonary embolism [Pulmonary Hypertension] : pulmonary hypertension [Follow-Up - From Hospitalization] : a follow-up visit after a recent hospitalization

## 2023-02-13 NOTE — ASSESSMENT
[FreeTextEntry1] : ---Assessment plan----------The patient has been referred here for further opinion regarding pulmonary problem,\par  \par Pt here for follow up pHTN/  PE and OLAMIDE.---. Continues on lasix ,ALDACTONE for pHTN, supplemental O2 with sats at 88% on RA at rest.  ---PT REPORTS  H/O PE IN 1995, 2010 AND 2012  ----VQ scan 7/2021 low prob for pE--- has pulmonary hypertension----admitted to [facility] January 2, 2002 3 for worsening respiratory failure was managed with diuretics and noninvasive ventilation-------\par \par 1) pulmonary hypertension----- continue sildenafil 20mg TID, Lasix 40mg daily and Aldactone 25mg, increase to 5 times weekly (she continued to take three times weekly after last visit). ----Patient did not tolerate macitentan hence it was stopped-\par 2) medically necessary to use oxygen x 24hrs\par 3) olamide- needs cpap initially did not have t DME coverage -----wt loss advised--in meantime-she will need a repeat BiPAP study as she has hypercapnia----------------\par 4) Labs today\par 5) needs pulmonary rehab\par 6) Repeat CTA chest   in 3 months time\par \par Thanks for allowing  me to participate  in the care of this patient.  Patient at this time  will follow  the above mentioned recommendations and return back for follow up visit. If you have any questions  I can be reached  at # [phone] (office).\par \par Rc Byrne MD, [facility] \par Director, Pulmonary Hypertension Program \par [facility] \par Division of Pulmonary, Critical Care and Sleep Medicine \par  Professor of Medicine \par [employer]\par \par \par \par \par \par

## 2023-02-14 ENCOUNTER — NON-APPOINTMENT (OUTPATIENT)
Age: 58
End: 2023-02-14

## 2023-02-14 LAB
ALBUMIN SERPL ELPH-MCNC: 4.1 G/DL
ALP BLD-CCNC: 146 U/L
ALT SERPL-CCNC: 30 U/L
ANION GAP SERPL CALC-SCNC: 13 MMOL/L
AST SERPL-CCNC: 16 U/L
BASOPHILS # BLD AUTO: 0.02 K/UL
BASOPHILS NFR BLD AUTO: 0.4 %
BILIRUB SERPL-MCNC: 0.3 MG/DL
BUN SERPL-MCNC: 9 MG/DL
CALCIUM SERPL-MCNC: 9.5 MG/DL
CHLORIDE SERPL-SCNC: 102 MMOL/L
CO2 SERPL-SCNC: 28 MMOL/L
CREAT SERPL-MCNC: 0.67 MG/DL
EGFR: 103 ML/MIN/1.73M2
EOSINOPHIL # BLD AUTO: 0.1 K/UL
EOSINOPHIL NFR BLD AUTO: 1.9 %
ESTIMATED AVERAGE GLUCOSE: 123 MG/DL
FERRITIN SERPL-MCNC: 46 NG/ML
GLUCOSE SERPL-MCNC: 106 MG/DL
HBA1C MFR BLD HPLC: 5.9 %
HCT VFR BLD CALC: 43.8 %
HGB BLD-MCNC: 13.1 G/DL
IMM GRANULOCYTES NFR BLD AUTO: 0 %
LYMPHOCYTES # BLD AUTO: 1.19 K/UL
LYMPHOCYTES NFR BLD AUTO: 22.2 %
MAN DIFF?: NORMAL
MCHC RBC-ENTMCNC: 28.2 PG
MCHC RBC-ENTMCNC: 29.9 GM/DL
MCV RBC AUTO: 94.4 FL
MONOCYTES # BLD AUTO: 0.46 K/UL
MONOCYTES NFR BLD AUTO: 8.6 %
NEUTROPHILS # BLD AUTO: 3.59 K/UL
NEUTROPHILS NFR BLD AUTO: 66.9 %
NT-PROBNP SERPL-MCNC: 34 PG/ML
PLATELET # BLD AUTO: 231 K/UL
POTASSIUM SERPL-SCNC: 4 MMOL/L
PROT SERPL-MCNC: 6.6 G/DL
RBC # BLD: 4.64 M/UL
RBC # FLD: 13.6 %
SODIUM SERPL-SCNC: 143 MMOL/L
TSH SERPL-ACNC: 1.29 UIU/ML
WBC # FLD AUTO: 5.36 K/UL

## 2023-03-06 ENCOUNTER — NON-APPOINTMENT (OUTPATIENT)
Age: 58
End: 2023-03-06

## 2023-03-09 ENCOUNTER — NON-APPOINTMENT (OUTPATIENT)
Age: 58
End: 2023-03-09

## 2023-03-22 NOTE — REASON FOR VISIT
[Follow-Up - From Hospitalization] : a follow-up visit after a recent hospitalization [Pulmonary Embolism] : pulmonary embolism [Pulmonary Hypertension] : pulmonary hypertension

## 2023-03-23 ENCOUNTER — NON-APPOINTMENT (OUTPATIENT)
Age: 58
End: 2023-03-23

## 2023-03-23 ENCOUNTER — APPOINTMENT (OUTPATIENT)
Dept: PULMONOLOGY | Facility: CLINIC | Age: 58
End: 2023-03-23
Payer: COMMERCIAL

## 2023-03-23 VITALS
HEART RATE: 112 BPM | BODY MASS INDEX: 53.92 KG/M2 | OXYGEN SATURATION: 93 % | HEIGHT: 62 IN | TEMPERATURE: 97.2 F | DIASTOLIC BLOOD PRESSURE: 85 MMHG | WEIGHT: 293 LBS | SYSTOLIC BLOOD PRESSURE: 149 MMHG | RESPIRATION RATE: 15 BRPM

## 2023-03-23 PROCEDURE — 99215 OFFICE O/P EST HI 40 MIN: CPT | Mod: 25

## 2023-03-23 PROCEDURE — 36415 COLL VENOUS BLD VENIPUNCTURE: CPT

## 2023-03-23 NOTE — ASSESSMENT
[FreeTextEntry1] : ---Assessment plan----------The patient has been referred here for further opinion regarding pulmonary problem,\par  \par Pt here for follow up pHTN/  PE and OLAMIDE.---. Continues on lasix ,ALDACTONE for pHTN, supplemental O2 with sats at 88% on RA at rest.  ---PT REPORTS  H/O PE IN 1995, 2010 AND 2012  ----VQ scan 7/2021 low prob for pE--- has pulmonary hypertension----admitted to [facility] January 2, 2002 3 for worsening respiratory failure was managed with diuretics and noninvasive ventilation-------\par \par 1) pulmonary hypertension----H/O , PAH GROUPI-- LASO multiple PEs and LLE DVT (on apixabV./C SCAN THOUGH HAVE BEEN LOW PROB--- continue sildenafil 20mg TID, Lasix 40mg daily and Aldactone 25mg, increase to 5 times weekly (she continued to take three times weekly after last visit). ----Patient did not tolerate macitentan hence it was stopped------WILL CONSIDER ORENITRAM DOWN THE LINE----NEED ECHO REPORT - - - -\par 2) medically necessary to use oxygen x 24hrs\par 3) olamide- needs cpap initially did not have t DME coverage -----wt loss advised--in meantime-she will need a repeat BiPAP study as she has hypercapnia----[ SHE APPEARS ERNESTO AHVE OHS] - - ------------\par 4) Labs today in our office\par 5) needs pulmonary rehab\par 6) Repeat CTA chest   in 3 months time\par 7) continue f/u with endo- \par 8) needs repeat echo\par \par Thanks for allowing  me to participate  in the care of this patient.  Patient at this time  will follow  the above mentioned recommendations and return back for follow up visit. If you have any questions  I can be reached  at # [phone] (office).\par \par Rc Byrne MD, [facility] \par \par \par

## 2023-03-23 NOTE — HISTORY OF PRESENT ILLNESS
[Never] : never [TextBox_4] : Pt here for follow up pHTN/  PE and AYAN.-on avaps  . Continues on lasix for pHTN, supplemental O2 with sats at 88% on RA at rest. --o--on supplemental oxygen\par \par PT REPORTS  H/O PE IN 1995, 2010 AND 2012  ----VQ scan 7/2021 low prob for pE--- has pulmonary hypertension\par 6MWT 2022--------ON 3L/ METERS\par CTA chest 7/2021\par IMPRESSION:\par No main, right, left, or lobar pulmonary embolism. Limited evaluation of segmental and subsegmental pulmonary arteries.\par No pneumonia.\par \par VQ scan 7/2021 low prob for pE\par ----VQ scan 2022-- low prob for pE-\par \par PFT 8/2021 moderate to severe restrictive lung disease\par \par HST w/TERESA 16.9- still has not received cpap\par \par \par : CT ANGIO CHEST PULM [facility] ORDERED BY: ERASMO GUZMAN\par PROCEDURE DATE: 01/19/2023\par INTERPRETATION: Reason for Exam: Shortness of breath. chest pain.\par CTA of the chest was performed from the thoracic inlet to the level of the adrenal glands following IV contrast injection of 80 cc of Omnipaque 350. No immediate complications were reported. MIP images were also created and reviewed.\par Mediastinum and Heart: Aorta and pulmonary arteries are normal in size. Thyroid gland is unremarkable. No lymphadenopathy. No pericardial effusion.\par Lungs, Pleura, and Airways: There is no pulmonary embolus. Patchy groundglass opacities noted within the upper lobes along with minimal patchy consolidation in the lower lobes peripherally, new since the previous exam.\par IMPRESSION:\par No pulmonary embolus.\par Patchy groundglass opacities noted within the upper lobes along with minimal patchy consolidation in the lower lobes peripherally, new since the previous exam. Entities such as atypical infection including COVID 19 should be considered.\par \par \par echo 7/2021 \par CONCLUSIONS:\par Technically difficult study.\par 1. Normal mitral valve. Minimal mitral regurgitation.\par 2. Normal left ventricular internal dimensions and wall\par thicknesses.\par 3. Endocardium not well visualized; grossly normal left\par ventricular systolic function.  Endocardial visualization\par \par enhanced with intravenous injection of echo contrast\par (Definity).\par 4. Unable to accurately evaluate right ventricular size or\par systolic function.\par \par echo 1/2023\par CONCLUSIONS:\par Technically difficult study.\par 1. Normal mitral valve. Minimal mitral regurgitation.\par 2. Normal left ventricular internal dimensions and wall\par thicknesses.\par 3. Endocardium not well visualized; grossly normal left\par ventricular systolic function.  Endocardial visualization\par enhanced with intravenous injection of echo contrast\par (Definity).\par 4. The right ventricle is not well visualized.\par 5. Inadequate tricuspid regurgitation Doppler envelope\par precludes estimation of RVSP.\par \par cardiac cath 7/2021 \par mean PA 30\par Aortic Pressure (S/D/M): 141/77/100\par Pressures:  -- Aortic Pressure (S/D/M): 53/20/34\par Pressures:  -- Pulmonary Capillary Wedge: 17/15/13\par Pressures:  -- Right Atrium (a/v/M): 16/11/10\par Pressures:  -- Right Ventricle (s/edp): 56/11/--\par CO by Ranjan: 7.79\par Pulmonary vascular resistance (Wood Units): 2.69\par Total pulmonary resistance (Wood Units): 4.36\par \par \par 1/6/2022 pulm htn Group I started on sildenafil 20mg tid and diuretics, on continuous oxygen\par On eliquis since 7/2021 for [place]\par She is c/o exertional dyspnea\par Has AYAN - not yet received cpap\par \par 2/22/2022 pulm htn group1 on 20 mg TID sildenafil,  diuretics, continuously oxygen 3 LMP\par on eliquis since 7/2021 \par she is still experiencing exertional dyspnea \par AWAITING  CPAP ---- cpap okay\par \par march 2022--------pulm htn group1 on 20 mg TID sildenafil,  diuretics, continuously oxygen 3 LMP\par on eliquis since 7/2021 \par she is still experiencing exertional dyspnea ---enouraged to partcipate i pulmonary rehab--WT LOSS ADVISED \par AWAITING  CPAP ---- cpap okay-----AGREE WITH HER REQUEST FRO DISABILITY\par \par 4/2022 pulm htn treated w/sildenafil 20mg tid- stable from resp perspective, on continuous oxygen and diuretics--- on Lasix will add SPIROLACTONE------  PHTN CONTD   sildenafil\par OSA_ unable to received cpap d/t coverage issue\par \par 5/2022 Feeling better than last visit with Lasix and Aldactone. Compliant with medications including Sildenafil 20mg TID, Lasix 40mg PO daily, and Aldactone 25mg TIW. Denies any fevers, chills. Currently on 3L nc O2. Endorses ET of at least 300 feet. Still unable to get her CPAP due to insurance issues. \par \par \par \par 7/2022 TTM with pt- unable to come in d/t transportation issues\par Pulm htn- on sildenafil 20mg tid, lasix 40mg bid, and spironolactone 25mg 3x weekly- having pedal edema. uses oxygen 3lpm x 24hrs, o2 sat drops to 88% room air rest.\par s/p cardiology appt with Dr Guillory- has f/u again in Sept\par AYAN- not on cpap d/t lack of DME coverage- anticipates new coverage 12/2022\par \par 9/2022--ON SILDENAFIL ----LASIX AND ALDACTONE --- Patient doing well, continued to take aldactone three times a week. Eating meals from her daughter who is a nutritionist, attempting to lose weight. States that her LE edema is improved. Continues to use oxygen. Completed 6MWT today.\par \par NOV 2022------ON SILDENAFIL ----LASIX AND ALDACTONE --- Patient doing well, continued to take aldactone three times a week.  On home oxygen 2 to 3 L/min---------. States that her LE edema is improved. Continues to use oxygen.------ awaiting CPAP study for her sleep apnea----------- needs a repeat echo\par \par \par 2/2023--- patient admitted to [facility] in January 2, 2002 3 for shortness of worsening shortness of breath-------- she is on on sildenafil, recently started on--macitentan 01/03/23)[ DID NOT TOLERATE MACETANTAN HENCE WAS STOPPED ]-----H/O , multiple PEs and LLE DVT (on apixaban), COPD on home oxygen 3L NC, AYAN/OHS (on NIV), obesity class III, HTN -----\par management.\par Was treated in the hospital for hypercapnic respiratory failure is on AVAPS at home\par Acute on chronic respiratory failure in the setting of ADHF and pHTN:\par \par  3/2023\par -H/O , PAH GROUPI-- LASO multiple PEs and LLE DVT (on apixabV./C SCAN THOUGH HAVE BEEN LOW PROB---, COPD on home oxygen 3L NC, AYAN/OHS (on NIV), obesity class III, HTN -----\par pulm htn- remains on sildenafil 20mg tid (unable to tolerate opsumit and stopped)-repeat CTA in 3 months time\par Infection-----\par On diuretics- lasix and aldactone\par Ambulates via walker\par \par Ayan- on nightly avaps- awaits bipap titration study (5/2023)\par follows endo-(Dr Zarco) started on ozempic and metformin\par \par No present pulm complaints- energy level has improved [TextBox_100] : 9/2021 [TextBox_108] : 16.9 [TextBox_112] : 13.5

## 2023-03-24 LAB
ALBUMIN SERPL ELPH-MCNC: 4.4 G/DL
ALP BLD-CCNC: 140 U/L
ALT SERPL-CCNC: 29 U/L
ANION GAP SERPL CALC-SCNC: 12 MMOL/L
AST SERPL-CCNC: 25 U/L
BASOPHILS # BLD AUTO: 0.03 K/UL
BASOPHILS NFR BLD AUTO: 0.4 %
BILIRUB SERPL-MCNC: 0.3 MG/DL
BUN SERPL-MCNC: 9 MG/DL
CALCIUM SERPL-MCNC: 10.8 MG/DL
CHLORIDE SERPL-SCNC: 102 MMOL/L
CO2 SERPL-SCNC: 29 MMOL/L
CREAT SERPL-MCNC: 0.78 MG/DL
EGFR: 90 ML/MIN/1.73M2
EOSINOPHIL # BLD AUTO: 0.13 K/UL
EOSINOPHIL NFR BLD AUTO: 1.8 %
GLUCOSE SERPL-MCNC: 114 MG/DL
HCT VFR BLD CALC: 45.4 %
HGB BLD-MCNC: 13.5 G/DL
IMM GRANULOCYTES NFR BLD AUTO: 0.1 %
LYMPHOCYTES # BLD AUTO: 1.7 K/UL
LYMPHOCYTES NFR BLD AUTO: 23.7 %
MAN DIFF?: NORMAL
MCHC RBC-ENTMCNC: 27.4 PG
MCHC RBC-ENTMCNC: 29.7 GM/DL
MCV RBC AUTO: 92.1 FL
MONOCYTES # BLD AUTO: 0.59 K/UL
MONOCYTES NFR BLD AUTO: 8.2 %
NEUTROPHILS # BLD AUTO: 4.72 K/UL
NEUTROPHILS NFR BLD AUTO: 65.8 %
NT-PROBNP SERPL-MCNC: 25 PG/ML
PLATELET # BLD AUTO: 269 K/UL
POTASSIUM SERPL-SCNC: 4 MMOL/L
PROT SERPL-MCNC: 6.7 G/DL
RBC # BLD: 4.93 M/UL
RBC # FLD: 14.2 %
SODIUM SERPL-SCNC: 143 MMOL/L
WBC # FLD AUTO: 7.18 K/UL

## 2023-03-31 NOTE — ED PROVIDER NOTE - MDM ORDERS SUBMITTED SELECTION
Physical Therapy Visit    Visit Type: Daily Treatment Note  Visit: 17  Referring Provider: Sivakumar Fulton MD  Medical Diagnosis (from order): Diagnosis Information    Diagnosis  V45.89, V13.59 (ICD-9-CM) - Z98.890, Z87.39 (ICD-10-CM) - S/P medial patellofemoral ligament reconstruction         SUBJECTIVE                                                                                                               Fracisco notes some stiffness in her knee after last session that improves with stretching.     Pain / Symptoms  - Pain rating (out of 10): Current: 1       OBJECTIVE                                                                                                                                       Treatment    Vasopneumatic Compression ([id number]): Game Ready  - Location: left knee   - Position: supine with leg(s) elevated  - Compression: low  - Temperature: 34° F  - Duration: 15 minutes    Results: decreased pain  Reaction: no adverse reaction to treatment      Therapeutic Exercise  Treadmill at 3.0 mph at 0% grade and 2.5 mph at 2% grade for AROM, subjective    Leg press - single leg -  3x6 left lower extremity 150 pounds - each lower extremity     MVP - 6 inches step - 1x10, 2x10 with 10 pound kettle bells each lower extremity     Updated home exercise program with education on frequency and duration (see below)               Neuromuscular Re-Education  Static lunge 3x10 each lower extremity with SPRI bar for upper extremity support   -verbal cueing and demonstration for glute vs quad dominance     Tandem stance with 12 pound kettle bell halo at waist 1x10 clockwise, 1x10 counter clockwise on airex pad     Skilled input: verbal instruction/cues, tactile instruction/cues, facilitation and as detailed above    Writer verbally educated and received verbal consent for hand placement, positioning of patient, and techniques to be performed today from patient for clothing adjustments for techniques, therapist position  for techniques, hand placement and palpation for techniques and modality application as described above and how they are pertinent to the patient's plan of care.    Home Exercise Program  Access Code: [email or web address]  URL: [email or web address]  Date: 03/31/2023  Prepared by: Jamel Alcantar    Exercises  - Supine Active Straight Leg Raise  - 3 x daily - 7 x weekly - 2-3 sets - 10 reps  - Mini Squat  - 1 x daily - 7 x weekly - 3 sets - 8 reps  - Standing Heel Raise  - 1 x daily - 7 x weekly - 2 sets - 10 reps  - Hip Abduction with Resistance Loop  - 1 x daily - 7 x weekly - 3 sets - 10 reps  - Supine Bridge  - 1 x daily - 7 x weekly - 3 sets - 10 reps  - Lunge with Counter Support  - 1 x daily - 7 x weekly - 3 sets - 10 reps        ASSESSMENT                                                                                                            Isa demonstrates increased lateral body sway with tandem stance on airex today with dual task. She requires verbal cueing to avoid increased stress to knee during progression of single leg closed kinetic chain exercises in sagittal plane.   Pain/symptoms after session (out of 10): 0  Education:   - Results of above outlined education: Verbalizes understanding, Demonstrates understanding and Needs reinforcement    PLAN                                                                                                                           Suggestions for next session as indicated: Progress per plan of care    Progress single leg balance, progress single leg closed kinetic chain exercises        Therapy procedure time and total treatment time can be found documented on the Time Entry flowsheet     Labs/EKG/Imaging Studies

## 2023-04-24 ENCOUNTER — NON-APPOINTMENT (OUTPATIENT)
Age: 58
End: 2023-04-24

## 2023-04-24 ENCOUNTER — APPOINTMENT (OUTPATIENT)
Dept: CARDIOLOGY | Facility: CLINIC | Age: 58
End: 2023-04-24
Payer: COMMERCIAL

## 2023-04-24 VITALS
SYSTOLIC BLOOD PRESSURE: 136 MMHG | WEIGHT: 293 LBS | OXYGEN SATURATION: 93 % | BODY MASS INDEX: 59.81 KG/M2 | DIASTOLIC BLOOD PRESSURE: 82 MMHG | HEART RATE: 101 BPM

## 2023-04-24 PROCEDURE — 99214 OFFICE O/P EST MOD 30 MIN: CPT | Mod: 25

## 2023-04-24 PROCEDURE — 93000 ELECTROCARDIOGRAM COMPLETE: CPT

## 2023-04-24 NOTE — REASON FOR VISIT
[CV Risk Factors and Non-Cardiac Disease] : CV risk factors and non-cardiac disease [Other: ____] : [unfilled] [Other: _____] : [unfilled] [FreeTextEntry1] : April 2023 - Patient returns today for follow-up in her usual state of health. She continues to use 3 L supplemental oxygen by nasal cannula. \clifford Started Ozempic and metformin with her endocrinologist. She has lost almost 30 lbs. \clifford [facility] She hopes to get off oxygen and get rid of her rolling walker dependence.

## 2023-04-24 NOTE — CARDIOLOGY SUMMARY
[de-identified] : 10/22/2021, sinus tachycardia with poor R-wave progression [de-identified] : 7/19/2021 RHC with Beau Shepherd MD at [facility], PCWP 13 mmHg, RA 10 mmHg, RV 56/11 mmHg, Ao 141/77 mmHg, CO 7.8 L/min by Ranjan

## 2023-04-24 NOTE — HISTORY OF PRESENT ILLNESS
[FreeTextEntry1] : Patient is a 57 year-old Black woman with known past medical history of hypertension, PE on anticoagulation, pulmonary hypertension, OLAMIDE, and morbid obesity.\par Admitted to [facility] for shortness of breath in July 2021. She had a right heart catheterization showing minimally elevated pulmonary capillary wedge pressure with elevated RVSP and RA pressures. She is now maintained on 3 L/min via nasal cannula 24 hours a day. She has been started on furosemide 40 mg BID and spironolactone that is ordered for 25 mg three days per week. \par Her daughter is a nutritionist and has been preparing her meals. She has lost almost 30 pounds in the past month. \par \par She has been trying to exercise more recently, including walking for 10 minutes at a time.\par \par January 2023 - Patient returns today for follow-up after a recent hospitalization for hypoxic respiratory failure, likely due to infection. She was started on AVAPS overnight. \par She wad discharged off her amlodipine and her losartan.

## 2023-04-25 NOTE — ED PROVIDER NOTE - RATE
Called and spoke with pt. Pt has a Video Visit with Dr. Vallecillo 5/02/23 at 9:30am. Lab appt needed prior.  Pt verbalized understanding and agreed to schedule.  Requested [facility] in [place].  Lab appt rescheduled 5/01/23 at 5/01/23 at 11:15am.    Lizet Cary LPN     83

## 2023-05-16 ENCOUNTER — APPOINTMENT (OUTPATIENT)
Dept: CV DIAGNOSITCS | Facility: HOSPITAL | Age: 58
End: 2023-05-16

## 2023-05-16 ENCOUNTER — OUTPATIENT (OUTPATIENT)
Dept: OUTPATIENT SERVICES | Facility: HOSPITAL | Age: 58
LOS: 1 days | End: 2023-05-16
Payer: COMMERCIAL

## 2023-05-16 DIAGNOSIS — Z98.891 HISTORY OF UTERINE SCAR FROM PREVIOUS SURGERY: Chronic | ICD-10-CM

## 2023-05-16 DIAGNOSIS — I27.20 PULMONARY HYPERTENSION, UNSPECIFIED: ICD-10-CM

## 2023-05-16 PROCEDURE — 93306 TTE W/DOPPLER COMPLETE: CPT | Mod: 26

## 2023-05-19 ENCOUNTER — FORM ENCOUNTER (OUTPATIENT)
Age: 58
End: 2023-05-19

## 2023-05-20 ENCOUNTER — APPOINTMENT (OUTPATIENT)
Dept: SLEEP CENTER | Facility: CLINIC | Age: 58
End: 2023-05-20
Payer: COMMERCIAL

## 2023-05-20 ENCOUNTER — OUTPATIENT (OUTPATIENT)
Dept: OUTPATIENT SERVICES | Facility: HOSPITAL | Age: 58
LOS: 1 days | End: 2023-05-20
Payer: COMMERCIAL

## 2023-05-20 DIAGNOSIS — Z98.891 HISTORY OF UTERINE SCAR FROM PREVIOUS SURGERY: Chronic | ICD-10-CM

## 2023-05-20 PROCEDURE — 95811 POLYSOM 6/>YRS CPAP 4/> PARM: CPT

## 2023-05-20 PROCEDURE — 95811 POLYSOM 6/>YRS CPAP 4/> PARM: CPT | Mod: 26

## 2023-05-22 ENCOUNTER — TRANSCRIPTION ENCOUNTER (OUTPATIENT)
Age: 58
End: 2023-05-22

## 2023-05-26 DIAGNOSIS — G47.33 OBSTRUCTIVE SLEEP APNEA (ADULT) (PEDIATRIC): ICD-10-CM

## 2023-06-05 ENCOUNTER — RX RENEWAL (OUTPATIENT)
Age: 58
End: 2023-06-05

## 2023-06-06 ENCOUNTER — TRANSCRIPTION ENCOUNTER (OUTPATIENT)
Age: 58
End: 2023-06-06

## 2023-07-12 NOTE — ED PROVIDER NOTE - CCCP TRG CHIEF CMPLNT
shortness of breath Tazorac Counseling:  Patient advised that medication is irritating and drying.  Patient may need to apply sparingly and wash off after an hour before eventually leaving it on overnight.  The patient verbalized understanding of the proper use and possible adverse effects of tazorac.  All of the patient's questions and concerns were addressed.

## 2023-07-21 ENCOUNTER — NON-APPOINTMENT (OUTPATIENT)
Age: 58
End: 2023-07-21

## 2023-07-22 ENCOUNTER — NON-APPOINTMENT (OUTPATIENT)
Age: 58
End: 2023-07-22

## 2023-08-08 ENCOUNTER — TRANSCRIPTION ENCOUNTER (OUTPATIENT)
Age: 58
End: 2023-08-08

## 2023-08-08 ENCOUNTER — NON-APPOINTMENT (OUTPATIENT)
Age: 58
End: 2023-08-08

## 2023-08-26 ENCOUNTER — NON-APPOINTMENT (OUTPATIENT)
Age: 58
End: 2023-08-26

## 2023-09-05 ENCOUNTER — TRANSCRIPTION ENCOUNTER (OUTPATIENT)
Age: 58
End: 2023-09-05

## 2023-09-22 NOTE — H&P ADULT - NSHPPOAPULMEMBOLUS_GEN_A_CORE
Analia, I am happy that you are feeling better.  I would stay on the present dose however in 6 months to a year you might notice a decreased response to this dose and at that point contact me and we will increase the dose.  Usual average dose for my patients is between 100-150 mg daily.  
Notified patient of Dr. Sy's response, patient verbalizes understanding and agrees with the plan.  
Patient called to provide a status update for CAB. Patient was started on Zoloft after 8/19/23 appt. States she has been taking 100 mg daily. Patient reports a \"significant difference\" and feels \"so much better\".  Patient does state she is happy with current dose, unsure if dose change is necessary.  Notified patient message will be sent to CAB. Patient grateful.   
no

## 2023-10-23 ENCOUNTER — APPOINTMENT (OUTPATIENT)
Dept: CARDIOLOGY | Facility: CLINIC | Age: 58
End: 2023-10-23
Payer: COMMERCIAL

## 2023-10-23 ENCOUNTER — NON-APPOINTMENT (OUTPATIENT)
Age: 58
End: 2023-10-23

## 2023-10-23 VITALS
OXYGEN SATURATION: 98 % | WEIGHT: 293 LBS | HEART RATE: 92 BPM | DIASTOLIC BLOOD PRESSURE: 91 MMHG | BODY MASS INDEX: 56.7 KG/M2 | SYSTOLIC BLOOD PRESSURE: 150 MMHG

## 2023-10-23 DIAGNOSIS — Z99.81 DEPENDENCE ON SUPPLEMENTAL OXYGEN: ICD-10-CM

## 2023-10-23 DIAGNOSIS — R60.0 LOCALIZED EDEMA: ICD-10-CM

## 2023-10-23 PROCEDURE — 93000 ELECTROCARDIOGRAM COMPLETE: CPT

## 2023-10-23 PROCEDURE — 99214 OFFICE O/P EST MOD 30 MIN: CPT | Mod: 25

## 2023-10-29 ENCOUNTER — NON-APPOINTMENT (OUTPATIENT)
Age: 58
End: 2023-10-29

## 2023-10-30 ENCOUNTER — APPOINTMENT (OUTPATIENT)
Dept: PULMONOLOGY | Facility: CLINIC | Age: 58
End: 2023-10-30
Payer: COMMERCIAL

## 2023-10-30 ENCOUNTER — MED ADMIN CHARGE (OUTPATIENT)
Age: 58
End: 2023-10-30

## 2023-10-30 VITALS
HEART RATE: 97 BPM | RESPIRATION RATE: 16 BRPM | HEIGHT: 62 IN | BODY MASS INDEX: 53.92 KG/M2 | TEMPERATURE: 97.8 F | OXYGEN SATURATION: 100 % | WEIGHT: 293 LBS | SYSTOLIC BLOOD PRESSURE: 151 MMHG | DIASTOLIC BLOOD PRESSURE: 78 MMHG

## 2023-10-30 PROCEDURE — 90686 IIV4 VACC NO PRSV 0.5 ML IM: CPT

## 2023-10-30 PROCEDURE — 94010 BREATHING CAPACITY TEST: CPT

## 2023-10-30 PROCEDURE — 36415 COLL VENOUS BLD VENIPUNCTURE: CPT

## 2023-10-30 PROCEDURE — G0008: CPT

## 2023-10-30 PROCEDURE — ZZZZZ: CPT

## 2023-10-30 PROCEDURE — 99215 OFFICE O/P EST HI 40 MIN: CPT | Mod: 25

## 2023-10-31 LAB
ALBUMIN SERPL ELPH-MCNC: 4.4 G/DL
ALP BLD-CCNC: 175 U/L
ALT SERPL-CCNC: 14 U/L
ANION GAP SERPL CALC-SCNC: 13 MMOL/L
AST SERPL-CCNC: 13 U/L
BILIRUB SERPL-MCNC: 0.5 MG/DL
BUN SERPL-MCNC: 11 MG/DL
CALCIUM SERPL-MCNC: 9.9 MG/DL
CHLORIDE SERPL-SCNC: 102 MMOL/L
CO2 SERPL-SCNC: 28 MMOL/L
CREAT SERPL-MCNC: 0.75 MG/DL
EGFR: 93 ML/MIN/1.73M2
GLUCOSE SERPL-MCNC: 91 MG/DL
HCT VFR BLD CALC: 44.2 %
HGB BLD-MCNC: 14.3 G/DL
MCHC RBC-ENTMCNC: 28.5 PG
MCHC RBC-ENTMCNC: 32.4 GM/DL
MCV RBC AUTO: 88 FL
NT-PROBNP SERPL-MCNC: <36 PG/ML
PLATELET # BLD AUTO: 278 K/UL
POTASSIUM SERPL-SCNC: 3.6 MMOL/L
PROT SERPL-MCNC: 6.9 G/DL
RBC # BLD: 5.02 M/UL
RBC # FLD: 13.5 %
SODIUM SERPL-SCNC: 142 MMOL/L
WBC # FLD AUTO: 6.25 K/UL

## 2023-12-07 ENCOUNTER — RX RENEWAL (OUTPATIENT)
Age: 58
End: 2023-12-07

## 2023-12-27 ENCOUNTER — TRANSCRIPTION ENCOUNTER (OUTPATIENT)
Age: 58
End: 2023-12-27

## 2023-12-27 RX ORDER — SILDENAFIL 20 MG/1
20 TABLET ORAL
Qty: 270 | Refills: 2 | Status: ACTIVE | COMMUNITY
Start: 2021-10-26 | End: 1900-01-01

## 2024-01-06 ENCOUNTER — NON-APPOINTMENT (OUTPATIENT)
Age: 59
End: 2024-01-06

## 2024-01-08 ENCOUNTER — TRANSCRIPTION ENCOUNTER (OUTPATIENT)
Age: 59
End: 2024-01-08

## 2024-01-22 ENCOUNTER — RX RENEWAL (OUTPATIENT)
Age: 59
End: 2024-01-22

## 2024-01-31 NOTE — DIETITIAN INITIAL EVALUATION ADULT - EDUCATION DIETARY MODIFICATIONS
Left message for a call back.    Vy Valentin LPN on 1/29/2024 at 3:50 PM    
Note has been printed, and should be available on MyChart.  
On 2nd letter was dated ok excuse on missing school for UTI- it should have been the 25th as had one for 24th  
Patient's mother states she got letter off Gali Mason CMA on 1/31/2024 at 11:54 AM  
Patients mom, Milka, requests a doctor note excusing the patient from school for today due to the patient staying home with a UTI.    Milka requests the doctor note be sent through [employer].    Madiha to leave detailed message.                    Kristan Clay on 1/25/2024 at 3:48 PM    
teach back/(2) meets goals/outcomes/verbalization

## 2024-02-20 ENCOUNTER — RX RENEWAL (OUTPATIENT)
Age: 59
End: 2024-02-20

## 2024-02-28 ENCOUNTER — RX RENEWAL (OUTPATIENT)
Age: 59
End: 2024-02-28

## 2024-02-28 RX ORDER — MONTELUKAST 10 MG/1
10 TABLET, FILM COATED ORAL
Qty: 90 | Refills: 0 | Status: ACTIVE | COMMUNITY
Start: 2022-01-06 | End: 1900-01-01

## 2024-03-04 ENCOUNTER — APPOINTMENT (OUTPATIENT)
Dept: PULMONOLOGY | Facility: CLINIC | Age: 59
End: 2024-03-04
Payer: COMMERCIAL

## 2024-03-04 VITALS
HEIGHT: 62 IN | BODY MASS INDEX: 53.92 KG/M2 | HEART RATE: 101 BPM | TEMPERATURE: 97.3 F | OXYGEN SATURATION: 98 % | RESPIRATION RATE: 16 BRPM | SYSTOLIC BLOOD PRESSURE: 137 MMHG | WEIGHT: 293 LBS | DIASTOLIC BLOOD PRESSURE: 80 MMHG

## 2024-03-04 DIAGNOSIS — I27.20 PULMONARY HYPERTENSION, UNSPECIFIED: ICD-10-CM

## 2024-03-04 PROCEDURE — 94618 PULMONARY STRESS TESTING: CPT

## 2024-03-04 PROCEDURE — 36415 COLL VENOUS BLD VENIPUNCTURE: CPT

## 2024-03-04 PROCEDURE — ZZZZZ: CPT

## 2024-03-04 PROCEDURE — 99215 OFFICE O/P EST HI 40 MIN: CPT | Mod: 25

## 2024-03-04 NOTE — END OF VISIT
[FreeTextEntry3] :   I, Dr. Rc Byrne  personally performed the evaluation and management (E/M) services for this established patient who presents today with (a) new problem(s)/exacerbation of (an) existing condition(s). That E/M includes conducting the clinically appropriate interval history &/or exam, assessing all new/exacerbated conditions, and establishing a new plan of care. Today, my TARA, Veronica Metz NP, , was here to observe my evaluation and management service for this new problem/exacerbated condition and follow the plan of care established by me going forward. [Time Spent: ___ minutes] : I have spent [unfilled] minutes of time on the encounter.

## 2024-03-04 NOTE — REASON FOR VISIT
[Follow-Up] : a follow-up visit [Pulmonary Embolism] : pulmonary embolism [Pulmonary Hypertension] : pulmonary hypertension [Sleep Apnea] : sleep apnea

## 2024-03-04 NOTE — HISTORY OF PRESENT ILLNESS
[Never] : never [TextBox_4] : Pt here for follow up pHTN/  PE and AYAN.-on avaps  . Continues on lasix for pHTN, supplemental O2 with sats at 88% on RA at rest. --o--on supplemental oxygen  PT REPORTS  H/O PE IN 1995, 2010 AND 2012  ----VQ scan 7/2021 low prob for pE--- has pulmonary hypertension 6MWT 2022--------ON 3L/ METERS CTA chest 7/2021 IMPRESSION: No main, right, left, or lobar pulmonary embolism. Limited evaluation of segmental and subsegmental pulmonary arteries. No pneumonia.  VQ scan 7/2021 low prob for pE ----VQ scan 2022-- low prob for pE-  PFT 8/2021 moderate to severe restrictive lung disease  [id number]------- 253 METRS ON 3L/MIN O2-------  HST w/TERESA 16.9- still has not received cpap   [facility]: ERASMO GUZMAN PROCEDURE DATE: 01/19/2023 INTERPRETATION: Reason for Exam: Shortness of breath. chest pain. CTA of the chest was performed from the thoracic inlet to the level of the adrenal glands following IV contrast injection of 80 cc of Omnipaque 350. No immediate complications were reported. MIP images were also created and reviewed. Mediastinum and Heart: Aorta and pulmonary arteries are normal in size. Thyroid gland is unremarkable. No lymphadenopathy. No pericardial effusion. Lungs, Pleura, and Airways: There is no pulmonary embolus. Patchy groundglass opacities noted within the upper lobes along with minimal patchy consolidation in the lower lobes peripherally, new since the previous exam. IMPRESSION: No pulmonary embolus. Patchy groundglass opacities noted within the upper lobes along with minimal patchy consolidation in the lower lobes peripherally, new since the previous exam. Entities such as atypical infection including COVID 19 should be considered.   echo 7/2021  CONCLUSIONS: Technically difficult study. 1. Normal mitral valve. Minimal mitral regurgitation. 2. Normal left ventricular internal dimensions and wall thicknesses. 3. Endocardium not well visualized; grossly normal left ventricular systolic function.  Endocardial visualization  enhanced with intravenous injection of echo contrast (Definity). 4. Unable to accurately evaluate right ventricular size or systolic function.  echo 1/2023 CONCLUSIONS: Technically difficult study. 1. Normal mitral valve. Minimal mitral regurgitation. 2. Normal left ventricular internal dimensions and wall thicknesses. 3. Endocardium not well visualized; grossly normal left ventricular systolic function.  Endocardial visualization enhanced with intravenous injection of echo contrast (Definity). 4. The right ventricle is not well visualized. 5. Inadequate tricuspid regurgitation Doppler envelope precludes estimation of RVSP.  cardiac cath 7/2021  mean PA 30 Aortic Pressure (S/D/M): 141/77/100 Pressures:  -- Aortic Pressure (S/D/M): 53/20/34 Pressures:  -- Pulmonary Capillary Wedge: 17/15/13 Pressures:  -- Right Atrium (a/v/M): 16/11/10 Pressures:  -- Right Ventricle (s/edp): 56/11/-- CO by Ranjan: 7.79 Pulmonary vascular resistance (Wood Units): 2.69 Total pulmonary resistance (Wood Units): 4.36   1/6/2022 pulm htn Group I started on sildenafil 20mg tid and diuretics, on continuous oxygen On eliquis since 7/2021 for [place] She is c/o exertional dyspnea Has AYAN - not yet received cpap  2/22/2022 pulm htn group1 on 20 mg TID sildenafil,  diuretics, continuously oxygen 3 LMP on eliquis since 7/2021  she is still experiencing exertional dyspnea  AWAITING  CPAP ---- cpap okay  march 2022--------pulm htn group1 on 20 mg TID sildenafil,  diuretics, continuously oxygen 3 LMP on eliquis since 7/2021  she is still experiencing exertional dyspnea ---enouraged to partcipate i pulmonary rehab--WT LOSS ADVISED  AWAITING  CPAP ---- cpap okay-----AGREE WITH HER REQUEST FRO DISABILITY  4/2022 pulm htn treated w/sildenafil 20mg tid- stable from resp perspective, on continuous oxygen and diuretics--- on Lasix will add SPIROLACTONE------  PHTN CONTD   sildenafil OSA_ unable to received cpap d/t coverage issue  5/2022 Feeling better than last visit with Lasix and Aldactone. Compliant with medications including Sildenafil 20mg TID, Lasix 40mg PO daily, and Aldactone 25mg TIW. Denies any fevers, chills. Currently on 3L nc O2. Endorses ET of at least 300 feet. Still unable to get her CPAP due to insurance issues.     7/2022 TTM with pt- unable to come in d/t transportation issues Pulm htn- on sildenafil 20mg tid, lasix 40mg bid, and spironolactone 25mg 3x weekly- having pedal edema. uses oxygen 3lpm x 24hrs, o2 sat drops to 88% room air rest. s/p cardiology appt with Dr Guillory- has f/u again in Sept AYAN- not on cpap d/t lack of DME coverage- anticipates new coverage 12/2022 9/2022--ON SILDENAFIL ----LASIX AND ALDACTONE --- Patient doing well, continued to take aldactone three times a week. Eating meals from her daughter who is a nutritionist, attempting to lose weight. States that her LE edema is improved. Continues to use oxygen. Completed 6MWT today.  NOV 2022------ON SILDENAFIL ----LASIX AND ALDACTONE --- Patient doing well, continued to take aldactone three times a week.  On home oxygen 2 to 3 L/min---------. States that her LE edema is improved. Continues to use oxygen.------ awaiting CPAP study for her sleep apnea----------- needs a repeat echo   2/2023--- patient admitted to [facility] in January 2, 2002 3 for shortness of worsening shortness of breath-------- she is on on sildenafil, recently started on--macitentan 01/03/23)[ DID NOT TOLERATE MACETANTAN HENCE WAS STOPPED ]-----H/O , multiple PEs and LLE DVT (on apixaban), COPD on home oxygen 3L NC, AYAN/OHS (on NIV), obesity class III, HTN ----- management. Was treated in the hospital for hypercapnic respiratory failure is on AVAPS at home Acute on chronic respiratory failure in the setting of ADHF and pHTN:   3/2023 -H/O , PAH GROUPI-- LASO multiple PEs and LLE DVT (on apixabV./C SCAN THOUGH HAVE BEEN LOW PROB---, COPD on home oxygen 3L NC, AYAN/OHS (on NIV), obesity class III, HTN ----- pulm htn- remains on sildenafil 20mg tid (unable to tolerate opsumit and stopped)-repeat CTA in 3 months time Infection----- On diuretics- lasix and aldactone Ambulates via walker  Ayan- on nightly avaps- awaits bipap titration study (5/2023) follows endo-(Dr Zarco) started on ozempic and metformin  No present pulm complaints- energy level has improved  10/2023 ----------H/O , PAH GROUPI-- LASO multiple PEs and LLE DVT (on apixabV./C SCAN THOUGH HAVE BEEN LOW PROB---, COPD on home oxygen 3L NC, AYAN/OHS (on NIV), obesity class III, HTN ----- pulm htn- remains on sildenafil 20mg tid (unable to tolerate opsumit and stopped)-repeat CTA in 3 months time Infection----- On diuretics- lasix and aldactone pulm htn- stable on sildenafil and diuretics follows shima Guillory. using oxygen x 24hrs- no pulm complaints ayan- on nightly AVAPS  and benefits from its use  MARCH 2024---------  --H/O , PAH GROUPI-- LASO multiple PEs and LLE DVT (on apixabV./C SCAN THOUGH HAVE BEEN LOW PROB---, COPD on home oxygen 3L NC, AYAN/OHS (on NIV), obesity class III, HTN ----- pulm htn- remains on sildenafil 20mg tid (unable to tolerate opsumit and stopped)- On diuretics- lasix and aldactone pulm htn- stable on sildenafil and diuretics follows shima Guillory. using oxygen x 24hrs- no pulm complaints ayan- on nightly AVAPS  and benefits from its use- compliance report indicates that pt is 97% comoliant continues to work n wt loss- on ozemic and exercises- has lost 60 lbs  [TextBox_100] : 9/2021 [TextBox_108] : 16.9 [TextBox_112] : 13.5

## 2024-03-04 NOTE — DISCUSSION/SUMMARY
[FreeTextEntry1] : ---Assessment plan----------The patient has been referred here for further opinion regarding pulmonary problem, 59yo with pHTN/ PE and OLAMIDE.---. Continues on lasix ,ALDACTONE for pHTN, supplemental O2 with sats at 88% on RA at rest. ---PT REPORTS H/O PE IN 1995, 2010 AND 2012 ----VQ scan 7/2021 low prob for pE--- has pulmonary hypertension----admitted to [facility]  January 2, 2002 3 for worsening respiratory failure was managed with diuretics and noninvasive ventilation-------    1) pulmonary hypertension----H/O , PAH GROUPI-- LASO multiple PEs and LLE DVT (on apixabV./C SCAN THOUGH HAVE BEEN LOW PROB--- continue sildenafil 20mg TID, Lasix 40mg daily and Aldactone 25mg, 5 times weekly------------------ ----Patient did not tolerate macitentan hence it was stopped------DID NOT TOLERATE  ORENITRAM -HENCE WAS STOPPED----needs echo annually- - - -  2) medically necessary to use oxygen x 24hrs and y connect to  avaps  3) olamide- on nightly AVAPS  and benefits from its use- compliance report indicates that pt is 97% comoliant----------  4) obesity -continue f/u with endo-on ozempic and metformin- continue wt loss---HAS LOST WEIGHT  SIX MIN WALK TEST HAS IMPROVED -----  7) annual echo, PFT and CT chest 8) labs drawn in our office today 9) f/u in   Thanks for allowing me to participate in the care of this patient. Patient at this time will follow the above mentioned recommendations and return back for follow up visit. If you have any questions I can be reached at # [phone] (office).    Rc Byrne MD, [facility]

## 2024-03-05 LAB
ALBUMIN SERPL ELPH-MCNC: 4.6 G/DL
ALP BLD-CCNC: 159 U/L
ALT SERPL-CCNC: 15 U/L
ANION GAP SERPL CALC-SCNC: 12 MMOL/L
AST SERPL-CCNC: 12 U/L
BILIRUB SERPL-MCNC: 0.3 MG/DL
BUN SERPL-MCNC: 11 MG/DL
CALCIUM SERPL-MCNC: 10.4 MG/DL
CHLORIDE SERPL-SCNC: 100 MMOL/L
CO2 SERPL-SCNC: 30 MMOL/L
CREAT SERPL-MCNC: 0.81 MG/DL
EGFR: 85 ML/MIN/1.73M2
GLUCOSE SERPL-MCNC: 95 MG/DL
HCT VFR BLD CALC: 45.7 %
HGB BLD-MCNC: 14.2 G/DL
MCHC RBC-ENTMCNC: 27.4 PG
MCHC RBC-ENTMCNC: 31.1 GM/DL
MCV RBC AUTO: 88.2 FL
NT-PROBNP SERPL-MCNC: <36 PG/ML
PLATELET # BLD AUTO: 313 K/UL
POTASSIUM SERPL-SCNC: 4.5 MMOL/L
PROT SERPL-MCNC: 7.1 G/DL
RBC # BLD: 5.18 M/UL
RBC # FLD: 13.2 %
SODIUM SERPL-SCNC: 142 MMOL/L
WBC # FLD AUTO: 7.33 K/UL

## 2024-03-13 ENCOUNTER — RX RENEWAL (OUTPATIENT)
Age: 59
End: 2024-03-13

## 2024-03-13 RX ORDER — SPIRONOLACTONE 25 MG/1
25 TABLET ORAL
Qty: 64 | Refills: 0 | Status: ACTIVE | COMMUNITY
Start: 2023-08-27 | End: 1900-01-01

## 2024-03-18 NOTE — DISCUSSION/SUMMARY
[FreeTextEntry1] : Patient is a 56 year-old woman with pulmonary hypertension, likely multifactorial, managed by Rc Byrne MD.\par Supplemental oxygen x24 hours per day.\par \par Continue diet and exercise with the goal of ongoing weight loss.\par \par There is no cardiac contraindication to proceeding with rehab. [facility]/Prescriptions electronically submitted to pharmacy from [facility]

## 2024-03-25 ENCOUNTER — RX RENEWAL (OUTPATIENT)
Age: 59
End: 2024-03-25

## 2024-03-25 ENCOUNTER — TRANSCRIPTION ENCOUNTER (OUTPATIENT)
Age: 59
End: 2024-03-25

## 2024-03-25 RX ORDER — APIXABAN 5 MG/1
5 TABLET, FILM COATED ORAL
Qty: 60 | Refills: 0 | Status: ACTIVE | COMMUNITY
Start: 2021-08-20 | End: 1900-01-01

## 2024-04-24 ENCOUNTER — RX RENEWAL (OUTPATIENT)
Age: 59
End: 2024-04-24

## 2024-04-24 RX ORDER — FUROSEMIDE 40 MG/1
40 TABLET ORAL TWICE DAILY
Qty: 180 | Refills: 0 | Status: ACTIVE | COMMUNITY
Start: 2021-07-21 | End: 1900-01-01

## 2024-04-29 ENCOUNTER — NON-APPOINTMENT (OUTPATIENT)
Age: 59
End: 2024-04-29

## 2024-04-29 ENCOUNTER — APPOINTMENT (OUTPATIENT)
Dept: CARDIOLOGY | Facility: CLINIC | Age: 59
End: 2024-04-29
Payer: MEDICARE

## 2024-04-29 VITALS
HEIGHT: 62 IN | BODY MASS INDEX: 53.92 KG/M2 | HEART RATE: 109 BPM | SYSTOLIC BLOOD PRESSURE: 142 MMHG | DIASTOLIC BLOOD PRESSURE: 91 MMHG | OXYGEN SATURATION: 100 % | WEIGHT: 293 LBS

## 2024-04-29 DIAGNOSIS — I10 ESSENTIAL (PRIMARY) HYPERTENSION: ICD-10-CM

## 2024-04-29 DIAGNOSIS — R06.09 OTHER FORMS OF DYSPNEA: ICD-10-CM

## 2024-04-29 DIAGNOSIS — I26.99 OTHER PULMONARY EMBOLISM W/OUT ACUTE COR PULMONALE: ICD-10-CM

## 2024-04-29 DIAGNOSIS — E66.01 MORBID (SEVERE) OBESITY DUE TO EXCESS CALORIES: ICD-10-CM

## 2024-04-29 DIAGNOSIS — E78.5 HYPERLIPIDEMIA, UNSPECIFIED: ICD-10-CM

## 2024-04-29 PROCEDURE — G2211 COMPLEX E/M VISIT ADD ON: CPT

## 2024-04-29 PROCEDURE — 93000 ELECTROCARDIOGRAM COMPLETE: CPT

## 2024-04-29 PROCEDURE — 99214 OFFICE O/P EST MOD 30 MIN: CPT

## 2024-04-29 RX ORDER — ROSUVASTATIN CALCIUM 10 MG/1
10 TABLET, FILM COATED ORAL
Qty: 90 | Refills: 2 | Status: ACTIVE | COMMUNITY
Start: 2024-04-29

## 2024-04-29 RX ORDER — AMLODIPINE BESYLATE 10 MG/1
10 TABLET ORAL
Qty: 90 | Refills: 0 | Status: DISCONTINUED | COMMUNITY
Start: 2022-06-16 | End: 2024-04-29

## 2024-04-29 RX ORDER — AMLODIPINE BESYLATE 10 MG/1
10 TABLET ORAL DAILY
Qty: 90 | Refills: 2 | Status: ACTIVE | COMMUNITY
Start: 2021-07-23

## 2024-04-29 NOTE — HISTORY OF PRESENT ILLNESS
[FreeTextEntry1] : Patient is a 58 year-old Black woman with known past medical history of hypertension, PE on anticoagulation, pulmonary hypertension, OLAMIDE, and morbid obesity. Admitted to [facility] for shortness of breath in July 2021. She had a right heart catheterization showing minimally elevated pulmonary capillary wedge pressure with elevated RVSP and RA pressures. She is now maintained on 3 L/min via nasal cannula 24 hours a day. She has been started on furosemide 40 mg BID and spironolactone that is ordered for 25 mg three days per week.  Her daughter is a nutritionist and has been preparing her meals. She has lost almost 30 pounds in the past month.   She has been trying to exercise more recently, including walking for 10 minutes at a time.  January 2023 - Patient returns today for follow-up after a recent hospitalization for hypoxic respiratory failure, likely due to infection. She was started on AVAPS overnight.  She wad discharged off her amlodipine and her losartan.   April 2023 - Patient returns today for follow-up in her usual state of health. She continues to use 3 L supplemental oxygen by nasal cannula.  Started Ozempic and metformin with her endocrinologist. She has lost almost 30 lbs.  Continues to walk daily on the [facility]. She hopes to get off oxygen and get rid of her rolling walker dependence.   October 2023 - Patient returns today for follow-up. She feels great. She has continued to lose weight. She remains on Ozempic. She walks for exercise. Sometimes, she goes without her oxygen by nasal cannula.

## 2024-04-29 NOTE — DISCUSSION/SUMMARY
[FreeTextEntry1] : Patient is a 58 year-old Black woman with pulmonary hypertension, likely multifactorial, managed by Rc Byrne MD. Supplemental oxygen x24 hours per day. AVAPS QHS.  For hypertension - recommend continuing amlodipine and losartan at this time.   Continue diet and exercise with the goal of ongoing weight loss.  There is no cardiac contraindication to proceeding with rehab. [EKG obtained to assist in diagnosis and management of assessed problem(s)] : EKG obtained to assist in diagnosis and management of assessed problem(s)

## 2024-04-29 NOTE — CARDIOLOGY SUMMARY
[de-identified] : 10/22/2021, sinus tachycardia with poor R-wave progression [de-identified] : 5/16/2023 - normal LA, unable to assess pulmonary pressures, LVEF 57% [de-identified] : 7/19/2021 RHC with Beau Shepherd MD at [facility], PCWP 13 mmHg, RA 10 mmHg, RV 56/11 mmHg, Ao 141/77 mmHg, CO 7.8 L/min by Ranjan

## 2024-04-29 NOTE — REASON FOR VISIT
[CV Risk Factors and Non-Cardiac Disease] : CV risk factors and non-cardiac disease [Other: ____] : [unfilled] [Other: _____] : [unfilled] [FreeTextEntry3] : Rc Byrne MD [FreeTextEntry1] : April 2024 - Patient returns today for follow-up. She feels great. She walks for exercise without assistance.  Her weight has plateaued. She remains on Ozempic, but she has not increased her dose recently.  She uses her oxygen around the clock, but sometimes she tests herself without it.

## 2024-05-23 ENCOUNTER — NON-APPOINTMENT (OUTPATIENT)
Age: 59
End: 2024-05-23

## 2024-05-24 ENCOUNTER — TRANSCRIPTION ENCOUNTER (OUTPATIENT)
Age: 59
End: 2024-05-24

## 2024-07-22 ENCOUNTER — TRANSCRIPTION ENCOUNTER (OUTPATIENT)
Age: 59
End: 2024-07-22

## 2024-07-22 ENCOUNTER — RX RENEWAL (OUTPATIENT)
Age: 59
End: 2024-07-22

## 2024-08-29 ENCOUNTER — NON-APPOINTMENT (OUTPATIENT)
Age: 59
End: 2024-08-29

## 2024-08-29 NOTE — REASON FOR VISIT
[Follow-Up] : a follow-up visit [Sleep Apnea] : sleep apnea [Pulmonary Embolism] : pulmonary embolism [Pulmonary Hypertension] : pulmonary hypertension

## 2024-09-02 ENCOUNTER — NON-APPOINTMENT (OUTPATIENT)
Age: 59
End: 2024-09-02

## 2024-09-03 ENCOUNTER — NON-APPOINTMENT (OUTPATIENT)
Age: 59
End: 2024-09-03

## 2024-09-03 ENCOUNTER — APPOINTMENT (OUTPATIENT)
Dept: PULMONOLOGY | Facility: CLINIC | Age: 59
End: 2024-09-03
Payer: COMMERCIAL

## 2024-09-03 VITALS
RESPIRATION RATE: 16 BRPM | HEART RATE: 104 BPM | WEIGHT: 293 LBS | HEIGHT: 62 IN | TEMPERATURE: 98.1 F | DIASTOLIC BLOOD PRESSURE: 97 MMHG | OXYGEN SATURATION: 96 % | SYSTOLIC BLOOD PRESSURE: 150 MMHG | BODY MASS INDEX: 53.92 KG/M2

## 2024-09-03 DIAGNOSIS — E66.01 MORBID (SEVERE) OBESITY DUE TO EXCESS CALORIES: ICD-10-CM

## 2024-09-03 DIAGNOSIS — I27.20 PULMONARY HYPERTENSION, UNSPECIFIED: ICD-10-CM

## 2024-09-03 DIAGNOSIS — G47.33 OBSTRUCTIVE SLEEP APNEA (ADULT) (PEDIATRIC): ICD-10-CM

## 2024-09-03 DIAGNOSIS — E78.5 HYPERLIPIDEMIA, UNSPECIFIED: ICD-10-CM

## 2024-09-03 PROCEDURE — 36415 COLL VENOUS BLD VENIPUNCTURE: CPT

## 2024-09-03 PROCEDURE — 99215 OFFICE O/P EST HI 40 MIN: CPT

## 2024-09-03 NOTE — PHYSICAL EXAM
[Thyroid Not Enlarged] : thyroid not enlarged [Normal Rate/Rhythm] : normal rate/rhythm [2+ Pitting] : 2+ pitting [TextBox_68] : [facility]

## 2024-09-03 NOTE — HISTORY OF PRESENT ILLNESS
[Never] : never [TextBox_4] : Pt here for follow up pHTN/  PE and AYAN.-on avaps  . Continues on lasix for pHTN, supplemental O2 with sats at 88% on RA at rest. --o--on supplemental oxygen  PT REPORTS  H/O PE IN 1995, 2010 AND 2012  ----VQ scan 7/2021 low prob for pE--- has pulmonary hypertension 6MWT 2022--------ON 3L/ METERS CTA chest 7/2021 IMPRESSION: No main, right, left, or lobar pulmonary embolism. Limited evaluation of segmental and subsegmental pulmonary arteries. No pneumonia.  VQ scan 7/2021 low prob for pE ----VQ scan 2022-- low prob for pE-  PFT 8/2021 moderate to severe restrictive lung disease  [id number]------- 253 METRS ON 3L/MIN O2-------  HST w/TERESA 16.9- still has not received cpap   [facility] BY: ERASMO GUZMAN PROCEDURE DATE: 01/19/2023 INTERPRETATION: Reason for Exam: Shortness of breath. chest pain. CTA of the chest was performed from the thoracic inlet to the level of the adrenal glands following IV contrast injection of 80 cc of Omnipaque 350. No immediate complications were reported. MIP images were also created and reviewed. Mediastinum and Heart: Aorta and pulmonary arteries are normal in size. Thyroid gland is unremarkable. No lymphadenopathy. No pericardial effusion. Lungs, Pleura, and Airways: There is no pulmonary embolus. Patchy groundglass opacities noted within the upper lobes along with minimal patchy consolidation in the lower lobes peripherally, new since the previous exam. IMPRESSION: No pulmonary embolus. Patchy groundglass opacities noted within the upper lobes along with minimal patchy consolidation in the lower lobes peripherally, new since the previous exam. Entities such as atypical infection including COVID 19 should be considered.   echo 7/2021  CONCLUSIONS: Technically difficult study. 1. Normal mitral valve. Minimal mitral regurgitation. 2. Normal left ventricular internal dimensions and wall thicknesses. 3. Endocardium not well visualized; grossly normal left ventricular systolic function.  Endocardial visualization  enhanced with intravenous injection of echo contrast (Definity). 4. Unable to accurately evaluate right ventricular size or systolic function.  echo 1/2023 CONCLUSIONS: Technically difficult study. 1. Normal mitral valve. Minimal mitral regurgitation. 2. Normal left ventricular internal dimensions and wall thicknesses. 3. Endocardium not well visualized; grossly normal left ventricular systolic function.  Endocardial visualization enhanced with intravenous injection of echo contrast (Definity). 4. The right ventricle is not well visualized. 5. Inadequate tricuspid regurgitation Doppler envelope precludes estimation of RVSP.  cardiac cath 7/2021  mean PA 30 Aortic Pressure (S/D/M): 141/77/100 Pressures:  -- Aortic Pressure (S/D/M): 53/20/34 Pressures:  -- Pulmonary Capillary Wedge: 17/15/13 Pressures:  -- Right Atrium (a/v/M): 16/11/10 Pressures:  -- Right Ventricle (s/edp): 56/11/-- CO by Ranjan: 7.79 Pulmonary vascular resistance (Wood Units): 2.69 Total pulmonary resistance (Wood Units): 4.36   1/6/2022 pulm htn Group I started on sildenafil 20mg tid and diuretics, on continuous oxygen On eliquis since 7/2021 for IA She is c/o exertional dyspnea Has AYAN - not yet received cpap  2/22/2022 pulm htn group1 on 20 mg TID sildenafil,  diuretics, continuously oxygen 3 LMP on eliquis since 7/2021  she is still experiencing exertional dyspnea  AWAITING  CPAP ---- cpap okay  march 2022--------pulm htn group1 on 20 mg TID sildenafil,  diuretics, continuously oxygen 3 LMP on eliquis since 7/2021  she is still experiencing exertional dyspnea ---enouraged to partcipate i pulmonary rehab--WT LOSS ADVISED  AWAITING  CPAP ---- cpap okay-----AGREE WITH HER REQUEST FRO DISABILITY  4/2022 pulm htn treated w/sildenafil 20mg tid- stable from resp perspective, on continuous oxygen and diuretics--- on Lasix will add SPIROLACTONE------  PHTN CONTD   sildenafil OSA_ unable to received cpap d/t coverage issue  5/2022 Feeling better than last visit with Lasix and Aldactone. Compliant with medications including Sildenafil 20mg TID, Lasix 40mg PO daily, and Aldactone 25mg TIW. Denies any fevers, chills. Currently on 3L nc O2. Endorses ET of at least 300 feet. Still unable to get her CPAP due to insurance issues.     7/2022 TTM with pt- unable to come in d/t transportation issues Pulm htn- on sildenafil 20mg tid, lasix 40mg bid, and spironolactone 25mg 3x weekly- having pedal edema. uses oxygen 3lpm x 24hrs, o2 sat drops to 88% room air rest. s/p cardiology appt with Dr Guillory- has f/u again in Sept AYAN- not on cpap d/t lack of DME coverage- anticipates new coverage 12/2022 9/2022--ON SILDENAFIL ----LASIX AND ALDACTONE --- Patient doing well, continued to take aldactone three times a week. Eating meals from her daughter who is a nutritionist, attempting to lose weight. States that her LE edema is improved. Continues to use oxygen. Completed 6MWT today.  NOV 2022------ON SILDENAFIL ----LASIX AND ALDACTONE --- Patient doing well, continued to take aldactone three times a week.  On home oxygen 2 to 3 L/min---------. States that her LE edema is improved. Continues to use oxygen.------ awaiting CPAP study for her sleep apnea----------- needs a repeat echo   2/2023--- patient admitted to [facility] in [facility] 2, 2002 3 for shortness of worsening shortness of breath-------- she is on on sildenafil, recently started on--macitentan 01/03/23)[ DID NOT TOLERATE MACETANTAN HENCE WAS STOPPED ]-----H/O , multiple PEs and LLE DVT (on apixaban), COPD on home oxygen 3L NC, AYAN/OHS (on NIV), obesity class III, HTN ----- management. Was treated in the hospital for hypercapnic respiratory failure is on AVAPS at home Acute on chronic respiratory failure in the setting of ADHF and pHTN:   3/2023 -H/O , PAH GROUPI-- LASO multiple PEs and LLE DVT (on apixabV./C SCAN THOUGH HAVE BEEN LOW PROB---, COPD on home oxygen 3L NC, AYAN/OHS (on NIV), obesity class III, HTN ----- pulm htn- remains on sildenafil 20mg tid (unable to tolerate opsumit and stopped)-repeat CTA in 3 months time Infection----- On diuretics- lasix and aldactone Ambulates via walker  Ayan- on nightly avaps- awaits bipap titration study (5/2023) follows endo-(Dr Zarco) started on ozempic and metformin  No present pulm complaints- energy level has improved  10/2023 ----------H/O , PAH GROUPI-- LASO multiple PEs and LLE DVT (on apixabV./C SCAN THOUGH HAVE BEEN LOW PROB---, COPD on home oxygen 3L NC, AYAN/OHS (on NIV), obesity class III, HTN ----- pulm htn- remains on sildenafil 20mg tid (unable to tolerate opsumit and stopped)-repeat CTA in 3 months time Infection----- On diuretics- lasix and aldactone pulm htn- stable on sildenafil and diuretics follows shima Guillory. using oxygen x 24hrs- no pulm complaints ayan- on nightly AVAPS  and benefits from its use  MARCH 2024---------  --H/O , PAH GROUPI-- LASO multiple PEs and LLE DVT (on apixabV./C SCAN THOUGH HAVE BEEN LOW PROB---, COPD on home oxygen 3L NC, AYAN/OHS (on NIV), obesity class III, HTN ----- pulm htn- remains on sildenafil 20mg tid (unable to tolerate opsumit and stopped)- On diuretics- lasix and aldactone pulm htn- stable on sildenafil and diuretics follows shima Guillory. using oxygen x 24hrs- no pulm complaints ayan- on nightly AVAPS  and benefits from its use- compliance report indicates that pt is 97% comoliant continues to work n wt loss- on ozemic and exercises- has lost 60 lbs   9/2024 H/O , PAH GROUPI-- LASO multiple PEs and LLE DVT (on apixabV./C SCAN THOUGH HAVE BEEN LOW PROB---, COPD on home oxygen 3L NC, AYAN/OHS (on NIV), obesity class III, HTN ---- She is on sildenafil for pulm htn and diuretics (lasix 40mg bid and aldactone 25mg 5x weekly)  ayan- on nightly AVAPS  and benefits from its use- compliance report indicates 100% compliance  She contnues to lose wt- was on ozempic but on 2 week hold d/t insurance Closely follows ryan Zarco [TextBox_100] : 9/2021 [TextBox_108] : 16.9 [TextBox_112] : 13.5

## 2024-09-03 NOTE — PHYSICAL EXAM
[Thyroid Not Enlarged] : thyroid not enlarged [Normal Rate/Rhythm] : normal rate/rhythm [2+ Pitting] : 2+ pitting [TextBox_68] : Decreased entry at bases

## 2024-09-03 NOTE — DISCUSSION/SUMMARY
[FreeTextEntry1] : ---Assessment plan----------The patient has been referred here for further opinion regarding pulmonary problem, 58yo with pHTN/ PE and OLAMIDE.---. Continues on lasix ,ALDACTONE for pHTN, supplemental O2 with sats at 88% on RA at rest. ---PT REPORTS H/O PE IN 1995, 2010 AND 2012 ----VQ scan 7/2021 low prob for pE--- has pulmonary hypertension----admitted to [facility] January 2, 2002 3 for worsening respiratory failure was managed with diuretics and noninvasive ventilation-------    1) pulmonary hypertension----H/O , PAH GROUPI--ALSO multiple PEs and LLE DVT (on apixabV./C SCAN THOUGH HAVE BEEN LOW PROB--- continue sildenafil 20mg TID, Lasix 40mg bid  and Aldactone 25mg, will increase to daily dosing ---------------- ----Patient did not tolerate macitentan hence it was stopped------DID NOT TOLERATE  ORENITRAM -HENCE WAS STOPPED----needs echo annually- - and follow up with Dr Guillory- -  2) medically necessary to use oxygen x 24hrs and y connect to  avaps  3) olamide- on nightly AVAPS  and benefits from its use- compliance report indicates that pt is 100% compliant----------  4) obesity -continue f/u with endo-on ozempic and metformin- continue wt loss---HAS LOST WEIGHT  SIX MIN WALK TEST HAS IMPROVED -----  7) annual echo, PFT, 6mwt  and CT chest 8) labs drawn in our office today 9) f/u in Feb 2025 m  Thanks for allowing me to participate in the care of this patient. Patient at this time will follow the above mentioned recommendations and return back for follow up visit. If you have any questions I can be reached at # [phone] (office).    Rc Byrne MD, [facility]

## 2024-09-03 NOTE — DISCUSSION/SUMMARY
[FreeTextEntry1] : ---Assessment plan----------The patient has been referred here for further opinion regarding pulmonary problem, 58yo with pHTN/ PE and OLAMIDE.---. Continues on lasix ,ALDACTONE for pHTN, supplemental O2 with sats at 88% on RA at rest. ---PT REPORTS H/O PE IN 1995, 2010 AND 2012 ----VQ scan 7/2021 low prob for pE--- has pulmonary hypertension----admitted to [facility] January 2, 2002 3 for worsening respiratory failure was managed with diuretics and noninvasive ventilation-------    1) pulmonary hypertension----H/O , PAH GROUPI--ALSO multiple PEs and LLE DVT (on apixabV./C SCAN THOUGH HAVE BEEN LOW PROB--- continue sildenafil 20mg TID, Lasix 40mg bid  and Aldactone 25mg, will increase to daily dosing ---------------- ----Patient did not tolerate macitentan hence it was stopped------DID NOT TOLERATE  ORENITRAM -HENCE WAS STOPPED----needs echo annually- - and follow up with Dr Guillory- -  2) medically necessary to use oxygen x 24hrs and y connect to  avaps  3) olamide- on nightly AVAPS  and benefits from its use- compliance report indicates that pt is 100% compliant----------  4) obesity -continue f/u with endo-on ozempic and metformin- continue wt loss---HAS LOST WEIGHT  SIX MIN WALK TEST HAS IMPROVED -----  7) annual echo, PFT, 6mwt  and CT chest 8) labs drawn in our office today 9) f/u in Feb 2025 m  Thanks for allowing me to participate in the care of this patient. Patient at this time will follow the above mentioned recommendations and return back for follow up visit. If you have any questions I can be reached at # [phone] (office).    Rc Byrne MD, [facility]

## 2024-09-03 NOTE — HISTORY OF PRESENT ILLNESS
[Never] : never [TextBox_4] : Pt here for follow up pHTN/  PE and AYAN.-on avaps  . Continues on lasix for pHTN, supplemental O2 with sats at 88% on RA at rest. --o--on supplemental oxygen  PT REPORTS  H/O PE IN 1995, 2010 AND 2012  ----VQ scan 7/2021 low prob for pE--- has pulmonary hypertension 6MWT 2022--------ON 3L/ METERS CTA chest 7/2021 IMPRESSION: No main, right, left, or lobar pulmonary embolism. Limited evaluation of segmental and subsegmental pulmonary arteries. No pneumonia.  VQ scan 7/2021 low prob for pE ----VQ scan 2022-- low prob for pE-  PFT 8/2021 moderate to severe restrictive lung disease  [id number]------- 253 METRS ON 3L/MIN O2-------  HST w/TERESA 16.9- still has not received cpap   : CT [facility] ORDERED BY: ERASMO GUZMAN PROCEDURE DATE: 01/19/2023 INTERPRETATION: Reason for Exam: Shortness of breath. chest pain. CTA of the chest was performed from the thoracic inlet to the level of the adrenal glands following IV contrast injection of 80 cc of Omnipaque 350. No immediate complications were reported. MIP images were also created and reviewed. Mediastinum and Heart: Aorta and pulmonary arteries are normal in size. Thyroid gland is unremarkable. No lymphadenopathy. No pericardial effusion. Lungs, Pleura, and Airways: There is no pulmonary embolus. Patchy groundglass opacities noted within the upper lobes along with minimal patchy consolidation in the lower lobes peripherally, new since the previous exam. IMPRESSION: No pulmonary embolus. Patchy groundglass opacities noted within the upper lobes along with minimal patchy consolidation in the lower lobes peripherally, new since the previous exam. Entities such as atypical infection including COVID 19 should be considered.   echo 7/2021  CONCLUSIONS: Technically difficult study. 1. Normal mitral valve. Minimal mitral regurgitation. 2. Normal left ventricular internal dimensions and wall thicknesses. 3. Endocardium not well visualized; grossly normal left ventricular systolic function.  Endocardial visualization  enhanced with intravenous injection of echo contrast (Definity). 4. Unable to accurately evaluate right ventricular size or systolic function.  echo 1/2023 CONCLUSIONS: Technically difficult study. 1. Normal mitral valve. Minimal mitral regurgitation. 2. Normal left ventricular internal dimensions and wall thicknesses. 3. Endocardium not well visualized; grossly normal left ventricular systolic function.  Endocardial visualization enhanced with intravenous injection of echo contrast (Definity). 4. The right ventricle is not well visualized. 5. Inadequate tricuspid regurgitation Doppler envelope precludes estimation of RVSP.  cardiac cath 7/2021  mean PA 30 Aortic Pressure (S/D/M): 141/77/100 Pressures:  -- Aortic Pressure (S/D/M): 53/20/34 Pressures:  -- Pulmonary Capillary Wedge: 17/15/13 Pressures:  -- Right Atrium (a/v/M): 16/11/10 Pressures:  -- Right Ventricle (s/edp): 56/11/-- CO by Ranjan: 7.79 Pulmonary vascular resistance (Wood Units): 2.69 Total pulmonary resistance (Wood Units): 4.36   1/6/2022 pulm htn Group I started on sildenafil 20mg tid and diuretics, on continuous oxygen On eliquis since 7/2021 for ME She is c/o exertional dyspnea Has AYAN - not yet received cpap  2/22/2022 pulm htn group1 on 20 mg TID sildenafil,  diuretics, continuously oxygen 3 LMP on eliquis since 7/2021  she is still experiencing exertional dyspnea  AWAITING  CPAP ---- cpap okay  march 2022--------pulm htn group1 on 20 mg TID sildenafil,  diuretics, continuously oxygen 3 LMP on eliquis since 7/2021  she is still experiencing exertional dyspnea ---enouraged to partcipate i pulmonary rehab--WT LOSS ADVISED  AWAITING  CPAP ---- cpap okay-----AGREE WITH HER REQUEST FRO DISABILITY  4/2022 pulm htn treated w/sildenafil 20mg tid- stable from resp perspective, on continuous oxygen and diuretics--- on Lasix will add SPIROLACTONE------  PHTN CONTD   sildenafil OSA_ unable to received cpap d/t coverage issue  5/2022 Feeling better than last visit with Lasix and Aldactone. Compliant with medications including Sildenafil 20mg TID, Lasix 40mg PO daily, and Aldactone 25mg TIW. Denies any fevers, chills. Currently on 3L nc O2. Endorses ET of at least 300 feet. Still unable to get her CPAP due to insurance issues.     7/2022 TTM with pt- unable to come in d/t transportation issues Pulm htn- on sildenafil 20mg tid, lasix 40mg bid, and spironolactone 25mg 3x weekly- having pedal edema. uses oxygen 3lpm x 24hrs, o2 sat drops to 88% room air rest. s/p cardiology appt with Dr Guillory- has f/u again in Sept AYAN- not on cpap d/t lack of DME coverage- anticipates new coverage 12/2022 9/2022--ON SILDENAFIL ----LASIX AND ALDACTONE --- Patient doing well, continued to take aldactone three times a week. Eating meals from her daughter who is a nutritionist, attempting to lose weight. States that her LE edema is improved. Continues to use oxygen. Completed 6MWT today.  NOV 2022------ON SILDENAFIL ----LASIX AND ALDACTONE --- Patient doing well, continued to take aldactone three times a week.  On home oxygen 2 to 3 L/min---------. States that her LE edema is improved. Continues to use oxygen.------ awaiting CPAP study for her sleep apnea----------- needs a repeat echo   2/2023--- patient admitted to [facility] in January 2, 2002 3 for shortness of worsening shortness of breath-------- she is on on sildenafil, recently started on--macitentan 01/03/23)[ DID NOT TOLERATE MACETANTAN HENCE WAS STOPPED ]-----H/O , multiple PEs and LLE DVT (on apixaban), COPD on home oxygen 3L NC, AYAN/OHS (on NIV), obesity class III, HTN ----- management. Was treated in the hospital for hypercapnic respiratory failure is on AVAPS at home Acute on chronic respiratory failure in the setting of ADHF and pHTN:   3/2023 -H/O , PAH GROUPI-- LASO multiple PEs and LLE DVT (on apixabV./C SCAN THOUGH HAVE BEEN LOW PROB---, COPD on home oxygen 3L NC, AYAN/OHS (on NIV), obesity class III, HTN ----- pulm htn- remains on sildenafil 20mg tid (unable to tolerate opsumit and stopped)-repeat CTA in 3 months time Infection----- On diuretics- lasix and aldactone Ambulates via walker  Ayan- on nightly avaps- awaits bipap titration study (5/2023) follows endo-(Dr Zarco) started on ozempic and metformin  No present pulm complaints- energy level has improved  10/2023 ----------H/O , PAH GROUPI-- LASO multiple PEs and LLE DVT (on apixabV./C SCAN THOUGH HAVE BEEN LOW PROB---, COPD on home oxygen 3L NC, AYAN/OHS (on NIV), obesity class III, HTN ----- pulm htn- remains on sildenafil 20mg tid (unable to tolerate opsumit and stopped)-repeat CTA in 3 months time Infection----- On diuretics- lasix and aldactone pulm htn- stable on sildenafil and diuretics follows shima Guillory. using oxygen x 24hrs- no pulm complaints ayan- on nightly AVAPS  and benefits from its use  MARCH 2024---------  --H/O , PAH GROUPI-- LASO multiple PEs and LLE DVT (on apixabV./C SCAN THOUGH HAVE BEEN LOW PROB---, COPD on home oxygen 3L NC, AYAN/OHS (on NIV), obesity class III, HTN ----- pulm htn- remains on sildenafil 20mg tid (unable to tolerate opsumit and stopped)- On diuretics- lasix and aldactone pulm htn- stable on sildenafil and diuretics follows shima Guillory. using oxygen x 24hrs- no pulm complaints ayan- on nightly AVAPS  and benefits from its use- compliance report indicates that pt is 97% comoliant continues to work n wt loss- on ozemic and exercises- has lost 60 lbs   9/2024 H/O , PAH GROUPI-- LASO multiple PEs and LLE DVT (on apixabV./C SCAN THOUGH HAVE BEEN LOW PROB---, COPD on home oxygen 3L NC, AYAN/OHS (on NIV), obesity class III, HTN ---- She is on sildenafil for pulm htn and diuretics (lasix 40mg bid and aldactone 25mg 5x weekly)  ayan- on nightly AVAPS  and benefits from its use- compliance report indicates 100% compliance  She contnues to lose wt- was on ozempic but on 2 week hold d/t insurance Closely follows ryan Zarco [TextBox_100] : 9/2021 [TextBox_108] : 16.9 [TextBox_112] : 13.5

## 2024-09-03 NOTE — END OF VISIT
[FreeTextEntry3] :   I, Dr. Rc Byrne  personally performed the evaluation and management (E/M) services for this established patient who presents today with (a) new problem(s)/exacerbation of (an) existing condition(s). That E/M includes conducting the clinically appropriate interval history &/or exam, assessing all new/exacerbated conditions, and establishing a new plan of care. Today, my TARA, Veronica Metz NP, , was here to observe my evaluation and management service for this new problem/exacerbated condition and follow the plan of care established by me going forward. [Time Spent: ___ minutes] : I have spent [unfilled] minutes of time on the encounter which excludes teaching and separately reported services.

## 2024-09-04 ENCOUNTER — NON-APPOINTMENT (OUTPATIENT)
Age: 59
End: 2024-09-04

## 2024-09-04 LAB
25(OH)D3 SERPL-MCNC: 48.6 NG/ML
CHOLEST SERPL-MCNC: 185 MG/DL
HDLC SERPL-MCNC: 60 MG/DL
LDLC SERPL CALC-MCNC: 112 MG/DL
NONHDLC SERPL-MCNC: 125 MG/DL
NT-PROBNP SERPL-MCNC: 50 PG/ML
TRIGL SERPL-MCNC: 69 MG/DL
TSH SERPL-ACNC: 1.95 UIU/ML
VIT B12 SERPL-MCNC: 328 PG/ML

## 2024-09-05 LAB
ALBUMIN SERPL ELPH-MCNC: 4.5 G/DL
ALP BLD-CCNC: 155 U/L
ALT SERPL-CCNC: 18 U/L
ANION GAP SERPL CALC-SCNC: 16 MMOL/L
AST SERPL-CCNC: 16 U/L
BILIRUB SERPL-MCNC: 0.2 MG/DL
BUN SERPL-MCNC: 12 MG/DL
CALCIUM SERPL-MCNC: 10.4 MG/DL
CHLORIDE SERPL-SCNC: 103 MMOL/L
CO2 SERPL-SCNC: 24 MMOL/L
CREAT SERPL-MCNC: 0.76 MG/DL
EGFR: 91 ML/MIN/1.73M2
GLUCOSE SERPL-MCNC: 103 MG/DL
POTASSIUM SERPL-SCNC: 4.2 MMOL/L
PROT SERPL-MCNC: 7.1 G/DL
SODIUM SERPL-SCNC: 143 MMOL/L

## 2024-09-06 LAB
DEPRECATED KAPPA LC FREE/LAMBDA SER: 1.6 RATIO
IGA SER QL IEP: 171 MG/DL
IGG SER QL IEP: 795 MG/DL
IGM SER QL IEP: 88 MG/DL
KAPPA LC CSF-MCNC: 0.89 MG/DL
KAPPA LC SERPL-MCNC: 1.42 MG/DL

## 2024-09-07 ENCOUNTER — NON-APPOINTMENT (OUTPATIENT)
Age: 59
End: 2024-09-07

## 2024-09-09 LAB — TOTAL IGE SMQN RAST: 101 KU/L

## 2024-09-22 ENCOUNTER — NON-APPOINTMENT (OUTPATIENT)
Age: 59
End: 2024-09-22

## 2024-09-24 DIAGNOSIS — I26.99 OTHER PULMONARY EMBOLISM W/OUT ACUTE COR PULMONALE: ICD-10-CM

## 2024-09-25 ENCOUNTER — NON-APPOINTMENT (OUTPATIENT)
Age: 59
End: 2024-09-25

## 2024-10-04 ENCOUNTER — RX RENEWAL (OUTPATIENT)
Age: 59
End: 2024-10-04

## 2024-10-17 ENCOUNTER — TRANSCRIPTION ENCOUNTER (OUTPATIENT)
Age: 59
End: 2024-10-17

## 2024-11-14 ENCOUNTER — NON-APPOINTMENT (OUTPATIENT)
Age: 59
End: 2024-11-14

## 2024-11-18 NOTE — H&P ADULT - NSHPPOADEEPVENOUSTHROMB_GEN_A_CORE
Medication: rulicity 1.5 MG/0.5ML Subcutaneous Solution Pen-injector   Medication refill denied due to Incorrect dosage.    Patient is now on     dulaglutide (Trulicity) 3 MG/0.5ML pen-injector       
no

## 2024-11-21 ENCOUNTER — NON-APPOINTMENT (OUTPATIENT)
Age: 59
End: 2024-11-21

## 2024-11-30 ENCOUNTER — OUTPATIENT (OUTPATIENT)
Dept: OUTPATIENT SERVICES | Facility: HOSPITAL | Age: 59
LOS: 1 days | End: 2024-11-30
Payer: MEDICARE

## 2024-11-30 ENCOUNTER — APPOINTMENT (OUTPATIENT)
Dept: ULTRASOUND IMAGING | Facility: IMAGING CENTER | Age: 59
End: 2024-11-30
Payer: MEDICARE

## 2024-11-30 ENCOUNTER — APPOINTMENT (OUTPATIENT)
Dept: MAMMOGRAPHY | Facility: IMAGING CENTER | Age: 59
End: 2024-11-30
Payer: MEDICARE

## 2024-11-30 DIAGNOSIS — Z98.891 HISTORY OF UTERINE SCAR FROM PREVIOUS SURGERY: Chronic | ICD-10-CM

## 2024-11-30 DIAGNOSIS — Z00.8 ENCOUNTER FOR OTHER GENERAL EXAMINATION: ICD-10-CM

## 2024-11-30 PROCEDURE — 77067 SCR MAMMO BI INCL CAD: CPT | Mod: 26

## 2024-11-30 PROCEDURE — 77063 BREAST TOMOSYNTHESIS BI: CPT

## 2024-11-30 PROCEDURE — 77063 BREAST TOMOSYNTHESIS BI: CPT | Mod: 26

## 2024-11-30 PROCEDURE — 77067 SCR MAMMO BI INCL CAD: CPT

## 2024-12-02 ENCOUNTER — NON-APPOINTMENT (OUTPATIENT)
Age: 59
End: 2024-12-02

## 2024-12-02 ENCOUNTER — APPOINTMENT (OUTPATIENT)
Dept: CARDIOLOGY | Facility: CLINIC | Age: 59
End: 2024-12-02
Payer: MEDICARE

## 2024-12-02 VITALS
OXYGEN SATURATION: 98 % | BODY MASS INDEX: 56.7 KG/M2 | DIASTOLIC BLOOD PRESSURE: 85 MMHG | SYSTOLIC BLOOD PRESSURE: 145 MMHG | WEIGHT: 293 LBS | HEART RATE: 95 BPM

## 2024-12-02 DIAGNOSIS — R06.09 OTHER FORMS OF DYSPNEA: ICD-10-CM

## 2024-12-02 DIAGNOSIS — E66.01 MORBID (SEVERE) OBESITY DUE TO EXCESS CALORIES: ICD-10-CM

## 2024-12-02 DIAGNOSIS — R60.0 LOCALIZED EDEMA: ICD-10-CM

## 2024-12-02 DIAGNOSIS — E78.5 HYPERLIPIDEMIA, UNSPECIFIED: ICD-10-CM

## 2024-12-02 PROCEDURE — 93000 ELECTROCARDIOGRAM COMPLETE: CPT

## 2024-12-02 PROCEDURE — 99214 OFFICE O/P EST MOD 30 MIN: CPT

## 2024-12-02 PROCEDURE — G2211 COMPLEX E/M VISIT ADD ON: CPT

## 2024-12-02 PROCEDURE — 93306 TTE W/DOPPLER COMPLETE: CPT

## 2024-12-02 RX ORDER — TIRZEPATIDE 2.5 MG/.5ML
2.5 INJECTION, SOLUTION SUBCUTANEOUS
Qty: 4 | Refills: 3 | Status: ACTIVE | COMMUNITY
Start: 2024-12-02 | End: 1900-01-01

## 2024-12-21 ENCOUNTER — APPOINTMENT (OUTPATIENT)
Dept: MAMMOGRAPHY | Facility: IMAGING CENTER | Age: 59
End: 2024-12-21
Payer: MEDICARE

## 2024-12-21 ENCOUNTER — OUTPATIENT (OUTPATIENT)
Dept: OUTPATIENT SERVICES | Facility: HOSPITAL | Age: 59
LOS: 1 days | End: 2024-12-21
Payer: MEDICARE

## 2024-12-21 ENCOUNTER — APPOINTMENT (OUTPATIENT)
Dept: ULTRASOUND IMAGING | Facility: IMAGING CENTER | Age: 59
End: 2024-12-21
Payer: MEDICARE

## 2024-12-21 DIAGNOSIS — Z98.891 HISTORY OF UTERINE SCAR FROM PREVIOUS SURGERY: Chronic | ICD-10-CM

## 2024-12-21 DIAGNOSIS — Z00.8 ENCOUNTER FOR OTHER GENERAL EXAMINATION: ICD-10-CM

## 2024-12-21 PROCEDURE — 77065 DX MAMMO INCL CAD UNI: CPT | Mod: 26,LT

## 2024-12-21 PROCEDURE — 76642 ULTRASOUND BREAST LIMITED: CPT | Mod: 26,LT

## 2024-12-21 PROCEDURE — 77065 DX MAMMO INCL CAD UNI: CPT

## 2024-12-21 PROCEDURE — G0279: CPT | Mod: 26

## 2024-12-21 PROCEDURE — G0279: CPT

## 2024-12-21 PROCEDURE — 76642 ULTRASOUND BREAST LIMITED: CPT

## 2025-01-21 ENCOUNTER — NON-APPOINTMENT (OUTPATIENT)
Age: 60
End: 2025-01-21

## 2025-01-21 ENCOUNTER — TRANSCRIPTION ENCOUNTER (OUTPATIENT)
Age: 60
End: 2025-01-21

## 2025-01-21 DIAGNOSIS — G47.33 OBSTRUCTIVE SLEEP APNEA (ADULT) (PEDIATRIC): ICD-10-CM

## 2025-01-24 ENCOUNTER — TRANSCRIPTION ENCOUNTER (OUTPATIENT)
Age: 60
End: 2025-01-24

## 2025-01-27 ENCOUNTER — TRANSCRIPTION ENCOUNTER (OUTPATIENT)
Age: 60
End: 2025-01-27

## 2025-02-01 ENCOUNTER — OUTPATIENT (OUTPATIENT)
Dept: OUTPATIENT SERVICES | Facility: HOSPITAL | Age: 60
LOS: 1 days | End: 2025-02-01
Payer: MEDICARE

## 2025-02-01 ENCOUNTER — APPOINTMENT (OUTPATIENT)
Dept: ULTRASOUND IMAGING | Facility: IMAGING CENTER | Age: 60
End: 2025-02-01

## 2025-02-01 DIAGNOSIS — Z98.891 HISTORY OF UTERINE SCAR FROM PREVIOUS SURGERY: Chronic | ICD-10-CM

## 2025-02-01 DIAGNOSIS — R92.8 OTHER ABNORMAL AND INCONCLUSIVE FINDINGS ON DIAGNOSTIC IMAGING OF BREAST: ICD-10-CM

## 2025-02-01 PROCEDURE — 88342 IMHCHEM/IMCYTCHM 1ST ANTB: CPT

## 2025-02-01 PROCEDURE — 38505 NEEDLE BIOPSY LYMPH NODES: CPT

## 2025-02-01 PROCEDURE — 88341 IMHCHEM/IMCYTCHM EA ADD ANTB: CPT

## 2025-02-01 PROCEDURE — 19083 BX BREAST 1ST LESION US IMAG: CPT | Mod: LT

## 2025-02-01 PROCEDURE — 76942 ECHO GUIDE FOR BIOPSY: CPT

## 2025-02-01 PROCEDURE — 88360 TUMOR IMMUNOHISTOCHEM/MANUAL: CPT | Mod: 26

## 2025-02-01 PROCEDURE — 88341 IMHCHEM/IMCYTCHM EA ADD ANTB: CPT | Mod: 26,59

## 2025-02-01 PROCEDURE — 38505 NEEDLE BIOPSY LYMPH NODES: CPT | Mod: LT

## 2025-02-01 PROCEDURE — 19083 BX BREAST 1ST LESION US IMAG: CPT

## 2025-02-01 PROCEDURE — 76942 ECHO GUIDE FOR BIOPSY: CPT | Mod: 26,59

## 2025-02-01 PROCEDURE — 88360 TUMOR IMMUNOHISTOCHEM/MANUAL: CPT

## 2025-02-01 PROCEDURE — 77065 DX MAMMO INCL CAD UNI: CPT | Mod: 26,LT

## 2025-02-01 PROCEDURE — 88305 TISSUE EXAM BY PATHOLOGIST: CPT | Mod: 26

## 2025-02-01 PROCEDURE — 77065 DX MAMMO INCL CAD UNI: CPT

## 2025-02-01 PROCEDURE — 88342 IMHCHEM/IMCYTCHM 1ST ANTB: CPT | Mod: 26,59

## 2025-02-01 PROCEDURE — 88305 TISSUE EXAM BY PATHOLOGIST: CPT

## 2025-02-05 LAB — SURGICAL PATHOLOGY STUDY: SIGNIFICANT CHANGE UP

## 2025-02-10 ENCOUNTER — NON-APPOINTMENT (OUTPATIENT)
Age: 60
End: 2025-02-10

## 2025-02-20 ENCOUNTER — NON-APPOINTMENT (OUTPATIENT)
Age: 60
End: 2025-02-20

## 2025-02-20 ENCOUNTER — APPOINTMENT (OUTPATIENT)
Dept: SURGICAL ONCOLOGY | Facility: CLINIC | Age: 60
End: 2025-02-20

## 2025-02-20 VITALS
SYSTOLIC BLOOD PRESSURE: 163 MMHG | OXYGEN SATURATION: 98 % | WEIGHT: 293 LBS | HEIGHT: 62 IN | DIASTOLIC BLOOD PRESSURE: 88 MMHG | HEART RATE: 85 BPM | RESPIRATION RATE: 17 BRPM | BODY MASS INDEX: 53.92 KG/M2

## 2025-02-20 PROCEDURE — 99203 OFFICE O/P NEW LOW 30 MIN: CPT

## 2025-03-03 ENCOUNTER — APPOINTMENT (OUTPATIENT)
Dept: PULMONOLOGY | Facility: CLINIC | Age: 60
End: 2025-03-03
Payer: MEDICARE

## 2025-03-03 ENCOUNTER — NON-APPOINTMENT (OUTPATIENT)
Age: 60
End: 2025-03-03

## 2025-03-03 VITALS
DIASTOLIC BLOOD PRESSURE: 70 MMHG | BODY MASS INDEX: 53.92 KG/M2 | SYSTOLIC BLOOD PRESSURE: 108 MMHG | OXYGEN SATURATION: 97 % | HEIGHT: 62 IN | HEART RATE: 106 BPM | WEIGHT: 293 LBS

## 2025-03-03 DIAGNOSIS — E66.01 MORBID (SEVERE) OBESITY DUE TO EXCESS CALORIES: ICD-10-CM

## 2025-03-03 DIAGNOSIS — G47.33 OBSTRUCTIVE SLEEP APNEA (ADULT) (PEDIATRIC): ICD-10-CM

## 2025-03-03 DIAGNOSIS — I27.20 PULMONARY HYPERTENSION, UNSPECIFIED: ICD-10-CM

## 2025-03-03 PROCEDURE — 94618 PULMONARY STRESS TESTING: CPT

## 2025-03-03 PROCEDURE — ZZZZZ: CPT

## 2025-03-03 PROCEDURE — 94726 PLETHYSMOGRAPHY LUNG VOLUMES: CPT

## 2025-03-03 PROCEDURE — 99204 OFFICE O/P NEW MOD 45 MIN: CPT | Mod: 25

## 2025-03-03 PROCEDURE — 99214 OFFICE O/P EST MOD 30 MIN: CPT | Mod: 25

## 2025-03-03 PROCEDURE — 94010 BREATHING CAPACITY TEST: CPT

## 2025-03-03 PROCEDURE — 94729 DIFFUSING CAPACITY: CPT

## 2025-03-04 LAB
25(OH)D3 SERPL-MCNC: 30.2 NG/ML
ALBUMIN SERPL ELPH-MCNC: 4.2 G/DL
ALP BLD-CCNC: 158 U/L
ALT SERPL-CCNC: 15 U/L
ANION GAP SERPL CALC-SCNC: 12 MMOL/L
AST SERPL-CCNC: 13 U/L
BASOPHILS # BLD AUTO: 0.02 K/UL
BASOPHILS NFR BLD AUTO: 0.3 %
BILIRUB SERPL-MCNC: 0.3 MG/DL
BUN SERPL-MCNC: 10 MG/DL
CALCIUM SERPL-MCNC: 9.8 MG/DL
CHLORIDE SERPL-SCNC: 106 MMOL/L
CO2 SERPL-SCNC: 27 MMOL/L
CREAT SERPL-MCNC: 0.78 MG/DL
EGFRCR SERPLBLD CKD-EPI 2021: 89 ML/MIN/1.73M2
EOSINOPHIL # BLD AUTO: 0.08 K/UL
EOSINOPHIL NFR BLD AUTO: 1.1 %
ESTIMATED AVERAGE GLUCOSE: 114 MG/DL
GLUCOSE SERPL-MCNC: 92 MG/DL
HBA1C MFR BLD HPLC: 5.6 %
HCT VFR BLD CALC: 41.3 %
HGB BLD-MCNC: 12.9 G/DL
IMM GRANULOCYTES NFR BLD AUTO: 0.4 %
LYMPHOCYTES # BLD AUTO: 1.79 K/UL
LYMPHOCYTES NFR BLD AUTO: 25.1 %
MAN DIFF?: NORMAL
MCHC RBC-ENTMCNC: 28.1 PG
MCHC RBC-ENTMCNC: 31.2 G/DL
MCV RBC AUTO: 90 FL
MONOCYTES # BLD AUTO: 0.55 K/UL
MONOCYTES NFR BLD AUTO: 7.7 %
NEUTROPHILS # BLD AUTO: 4.65 K/UL
NEUTROPHILS NFR BLD AUTO: 65.4 %
NT-PROBNP SERPL-MCNC: <36 PG/ML
PLATELET # BLD AUTO: 268 K/UL
POTASSIUM SERPL-SCNC: 4.2 MMOL/L
PROT SERPL-MCNC: 6.5 G/DL
RBC # BLD: 4.59 M/UL
RBC # FLD: 13.8 %
SODIUM SERPL-SCNC: 145 MMOL/L
TSH SERPL-ACNC: 1.32 UIU/ML
WBC # FLD AUTO: 7.12 K/UL

## 2025-03-19 ENCOUNTER — NON-APPOINTMENT (OUTPATIENT)
Age: 60
End: 2025-03-19

## 2025-04-04 PROBLEM — J98.4 RESTRICTIVE LUNG DISEASE: Status: ACTIVE | Noted: 2025-04-04

## 2025-04-07 DIAGNOSIS — N60.99 UNSPECIFIED BENIGN MAMMARY DYSPLASIA OF UNSPECIFIED BREAST: ICD-10-CM

## 2025-05-03 ENCOUNTER — OUTPATIENT (OUTPATIENT)
Dept: OUTPATIENT SERVICES | Facility: HOSPITAL | Age: 60
LOS: 1 days | End: 2025-05-03
Payer: MEDICARE

## 2025-05-03 ENCOUNTER — RESULT REVIEW (OUTPATIENT)
Age: 60
End: 2025-05-03

## 2025-05-03 ENCOUNTER — APPOINTMENT (OUTPATIENT)
Dept: MAMMOGRAPHY | Facility: IMAGING CENTER | Age: 60
End: 2025-05-03
Payer: MEDICARE

## 2025-05-03 DIAGNOSIS — Z98.891 HISTORY OF UTERINE SCAR FROM PREVIOUS SURGERY: Chronic | ICD-10-CM

## 2025-05-03 DIAGNOSIS — N60.99 UNSPECIFIED BENIGN MAMMARY DYSPLASIA OF UNSPECIFIED BREAST: ICD-10-CM

## 2025-05-03 PROCEDURE — 77065 DX MAMMO INCL CAD UNI: CPT

## 2025-05-03 PROCEDURE — G0279: CPT | Mod: 26

## 2025-05-03 PROCEDURE — 76642 ULTRASOUND BREAST LIMITED: CPT | Mod: 26,LT

## 2025-05-03 PROCEDURE — 77065 DX MAMMO INCL CAD UNI: CPT | Mod: 26,LT

## 2025-05-03 PROCEDURE — G0279: CPT

## 2025-05-03 PROCEDURE — 76642 ULTRASOUND BREAST LIMITED: CPT

## 2025-05-09 ENCOUNTER — TRANSCRIPTION ENCOUNTER (OUTPATIENT)
Age: 60
End: 2025-05-09

## 2025-05-14 ENCOUNTER — NON-APPOINTMENT (OUTPATIENT)
Age: 60
End: 2025-05-14

## 2025-05-15 ENCOUNTER — APPOINTMENT (OUTPATIENT)
Dept: SURGICAL ONCOLOGY | Facility: CLINIC | Age: 60
End: 2025-05-15
Payer: MEDICARE

## 2025-05-15 VITALS
OXYGEN SATURATION: 98 % | SYSTOLIC BLOOD PRESSURE: 118 MMHG | WEIGHT: 293 LBS | HEART RATE: 105 BPM | DIASTOLIC BLOOD PRESSURE: 75 MMHG | BODY MASS INDEX: 53.92 KG/M2 | HEIGHT: 62 IN

## 2025-05-15 DIAGNOSIS — N60.99 UNSPECIFIED BENIGN MAMMARY DYSPLASIA OF UNSPECIFIED BREAST: ICD-10-CM

## 2025-05-15 PROCEDURE — 99213 OFFICE O/P EST LOW 20 MIN: CPT

## 2025-05-22 ENCOUNTER — NON-APPOINTMENT (OUTPATIENT)
Age: 60
End: 2025-05-22

## 2025-05-22 ENCOUNTER — APPOINTMENT (OUTPATIENT)
Dept: CARDIOLOGY | Facility: CLINIC | Age: 60
End: 2025-05-22
Payer: MEDICARE

## 2025-05-22 VITALS — HEART RATE: 86 BPM | BODY MASS INDEX: 53.92 KG/M2 | WEIGHT: 293 LBS | HEIGHT: 62 IN

## 2025-05-22 DIAGNOSIS — I27.20 PULMONARY HYPERTENSION, UNSPECIFIED: ICD-10-CM

## 2025-05-22 DIAGNOSIS — I26.99 OTHER PULMONARY EMBOLISM W/OUT ACUTE COR PULMONALE: ICD-10-CM

## 2025-05-22 DIAGNOSIS — E66.01 MORBID (SEVERE) OBESITY DUE TO EXCESS CALORIES: ICD-10-CM

## 2025-05-22 PROCEDURE — G2211 COMPLEX E/M VISIT ADD ON: CPT

## 2025-05-22 PROCEDURE — 93000 ELECTROCARDIOGRAM COMPLETE: CPT

## 2025-05-22 PROCEDURE — 99215 OFFICE O/P EST HI 40 MIN: CPT

## 2025-06-02 RX ORDER — TIRZEPATIDE 2.5 MG/.5ML
2.5 INJECTION, SOLUTION SUBCUTANEOUS
Qty: 1 | Refills: 1 | Status: ACTIVE | COMMUNITY
Start: 2025-05-29

## 2025-06-13 ENCOUNTER — NON-APPOINTMENT (OUTPATIENT)
Age: 60
End: 2025-06-13

## 2025-07-05 ENCOUNTER — NON-APPOINTMENT (OUTPATIENT)
Age: 60
End: 2025-07-05

## 2025-08-18 ENCOUNTER — APPOINTMENT (OUTPATIENT)
Dept: HEMATOLOGY ONCOLOGY | Facility: CLINIC | Age: 60
End: 2025-08-18

## 2025-08-18 VITALS
BODY MASS INDEX: 53.92 KG/M2 | TEMPERATURE: 97.7 F | DIASTOLIC BLOOD PRESSURE: 83 MMHG | SYSTOLIC BLOOD PRESSURE: 163 MMHG | HEIGHT: 61.81 IN | OXYGEN SATURATION: 96 % | WEIGHT: 293 LBS | HEART RATE: 99 BPM | RESPIRATION RATE: 16 BRPM

## 2025-08-18 DIAGNOSIS — N60.99 UNSPECIFIED BENIGN MAMMARY DYSPLASIA OF UNSPECIFIED BREAST: ICD-10-CM

## 2025-08-18 PROCEDURE — 99205 OFFICE O/P NEW HI 60 MIN: CPT

## 2025-08-18 RX ORDER — ANASTROZOLE TABLETS 1 MG/1
1 TABLET ORAL
Qty: 90 | Refills: 1 | Status: ACTIVE | COMMUNITY
Start: 2025-08-18 | End: 1900-01-01

## 2025-09-04 ENCOUNTER — APPOINTMENT (OUTPATIENT)
Dept: PULMONOLOGY | Facility: CLINIC | Age: 60
End: 2025-09-04
Payer: MEDICARE

## 2025-09-04 ENCOUNTER — MED ADMIN CHARGE (OUTPATIENT)
Age: 60
End: 2025-09-04

## 2025-09-04 VITALS
DIASTOLIC BLOOD PRESSURE: 88 MMHG | WEIGHT: 293 LBS | OXYGEN SATURATION: 96 % | HEIGHT: 62 IN | SYSTOLIC BLOOD PRESSURE: 149 MMHG | HEART RATE: 94 BPM | BODY MASS INDEX: 53.92 KG/M2

## 2025-09-04 PROCEDURE — 94729 DIFFUSING CAPACITY: CPT

## 2025-09-04 PROCEDURE — 99214 OFFICE O/P EST MOD 30 MIN: CPT | Mod: 25

## 2025-09-04 PROCEDURE — 94010 BREATHING CAPACITY TEST: CPT

## 2025-09-04 PROCEDURE — ZZZZZ: CPT

## 2025-09-04 PROCEDURE — 82803 BLOOD GASES ANY COMBINATION: CPT

## 2025-09-04 PROCEDURE — 90656 IIV3 VACC NO PRSV 0.5 ML IM: CPT

## 2025-09-04 PROCEDURE — G0008: CPT

## 2025-09-04 PROCEDURE — 94726 PLETHYSMOGRAPHY LUNG VOLUMES: CPT

## 2025-09-04 PROCEDURE — 36600 WITHDRAWAL OF ARTERIAL BLOOD: CPT | Mod: 59
